# Patient Record
Sex: MALE | Race: WHITE | NOT HISPANIC OR LATINO | Employment: FULL TIME | URBAN - METROPOLITAN AREA
[De-identification: names, ages, dates, MRNs, and addresses within clinical notes are randomized per-mention and may not be internally consistent; named-entity substitution may affect disease eponyms.]

---

## 2019-03-04 PROBLEM — E78.5 DYSLIPIDEMIA: Status: ACTIVE | Noted: 2019-03-04

## 2019-03-04 PROBLEM — E11.9 CONTROLLED TYPE 2 DIABETES MELLITUS WITHOUT COMPLICATION, WITHOUT LONG-TERM CURRENT USE OF INSULIN: Status: ACTIVE | Noted: 2019-03-04

## 2019-03-04 PROBLEM — M76.60 ACHILLES TENDINITIS: Status: ACTIVE | Noted: 2019-03-04

## 2020-02-12 PROBLEM — K21.9 GASTROESOPHAGEAL REFLUX DISEASE: Status: ACTIVE | Noted: 2020-02-12

## 2020-02-12 PROBLEM — J32.9 RHINOSINUSITIS: Status: ACTIVE | Noted: 2020-02-12

## 2020-02-12 PROBLEM — R05.9 COUGH: Status: ACTIVE | Noted: 2020-02-12

## 2020-02-14 PROBLEM — E11.65 UNCONTROLLED TYPE 2 DIABETES MELLITUS WITH HYPERGLYCEMIA: Status: ACTIVE | Noted: 2020-02-14

## 2020-05-20 PROBLEM — R05.3 CHRONIC COUGH: Status: ACTIVE | Noted: 2020-02-12

## 2020-05-20 PROBLEM — R93.89 ABNORMAL CHEST X-RAY: Status: ACTIVE | Noted: 2020-05-20

## 2020-11-17 PROBLEM — H35.033 HYPERTENSIVE RETINOPATHY OF BOTH EYES: Status: ACTIVE | Noted: 2020-11-17

## 2020-11-17 PROBLEM — E11.3293 CONTROLLED TYPE 2 DIABETES MELLITUS WITH BOTH EYES AFFECTED BY MILD NONPROLIFERATIVE RETINOPATHY WITHOUT MACULAR EDEMA, WITHOUT LONG-TERM CURRENT USE OF INSULIN: Status: ACTIVE | Noted: 2020-11-17

## 2022-01-10 PROBLEM — U07.1 PNEUMONIA DUE TO COVID-19 VIRUS: Status: ACTIVE | Noted: 2022-01-10

## 2022-01-10 PROBLEM — J12.82 PNEUMONIA DUE TO COVID-19 VIRUS: Status: ACTIVE | Noted: 2022-01-10

## 2022-01-11 PROBLEM — R09.02 HYPOXEMIA: Status: ACTIVE | Noted: 2022-01-11

## 2022-01-11 PROBLEM — D50.9 MICROCYTIC ANEMIA: Status: ACTIVE | Noted: 2022-01-11

## 2022-02-17 ENCOUNTER — TELEPHONE (OUTPATIENT)
Dept: ENDOCRINOLOGY | Facility: CLINIC | Age: 67
End: 2022-02-17
Payer: MEDICARE

## 2022-02-17 NOTE — TELEPHONE ENCOUNTER
----- Message from Tres Estes sent at 2/17/2022  9:41 AM CST -----  Contact: Self  Type:  Sooner Apoointment Request    Caller is requesting a sooner appointment.  Caller declined first available appointment listed below.  Caller will not accept being placed on the waitlist and is requesting a message be sent to doctor.    Name of Caller:  Patient  When is the first available appointment?  3/21  Symptoms:  T2D, ref by   Best Call Back Number:  [phone]   Additional Information:  Ref by PCP, ok with seeing NP on any day Wed-Fri

## 2022-03-28 ENCOUNTER — TELEPHONE (OUTPATIENT)
Dept: SURGERY | Facility: CLINIC | Age: 67
End: 2022-03-28
Payer: MEDICARE

## 2022-03-28 ENCOUNTER — PATIENT MESSAGE (OUTPATIENT)
Dept: SURGERY | Facility: CLINIC | Age: 67
End: 2022-03-28
Payer: MEDICARE

## 2022-03-30 ENCOUNTER — OFFICE VISIT (OUTPATIENT)
Dept: SURGERY | Facility: CLINIC | Age: 67
End: 2022-03-30
Payer: MEDICARE

## 2022-03-30 VITALS
BODY MASS INDEX: 31.62 KG/M2 | DIASTOLIC BLOOD PRESSURE: 75 MMHG | HEIGHT: 73 IN | HEART RATE: 73 BPM | WEIGHT: 238.56 LBS | SYSTOLIC BLOOD PRESSURE: 148 MMHG

## 2022-03-30 DIAGNOSIS — C20 RECTAL CANCER: Primary | ICD-10-CM

## 2022-03-30 PROCEDURE — 1101F PT FALLS ASSESS-DOCD LE1/YR: CPT | Mod: CPTII,S$GLB,, | Performed by: COLON & RECTAL SURGERY

## 2022-03-30 PROCEDURE — 3078F PR MOST RECENT DIASTOLIC BLOOD PRESSURE < 80 MM HG: ICD-10-PCS | Mod: CPTII,S$GLB,, | Performed by: COLON & RECTAL SURGERY

## 2022-03-30 PROCEDURE — 1126F PR PAIN SEVERITY QUANTIFIED, NO PAIN PRESENT: ICD-10-PCS | Mod: CPTII,S$GLB,, | Performed by: COLON & RECTAL SURGERY

## 2022-03-30 PROCEDURE — 3288F FALL RISK ASSESSMENT DOCD: CPT | Mod: CPTII,S$GLB,, | Performed by: COLON & RECTAL SURGERY

## 2022-03-30 PROCEDURE — 99205 OFFICE O/P NEW HI 60 MIN: CPT | Mod: 25,S$GLB,, | Performed by: COLON & RECTAL SURGERY

## 2022-03-30 PROCEDURE — 3046F PR MOST RECENT HEMOGLOBIN A1C LEVEL > 9.0%: ICD-10-PCS | Mod: CPTII,S$GLB,, | Performed by: COLON & RECTAL SURGERY

## 2022-03-30 PROCEDURE — 3078F DIAST BP <80 MM HG: CPT | Mod: CPTII,S$GLB,, | Performed by: COLON & RECTAL SURGERY

## 2022-03-30 PROCEDURE — 3077F PR MOST RECENT SYSTOLIC BLOOD PRESSURE >= 140 MM HG: ICD-10-PCS | Mod: CPTII,S$GLB,, | Performed by: COLON & RECTAL SURGERY

## 2022-03-30 PROCEDURE — 45330 PR SIGMOIDOSCOPY,DIAG2STIC: ICD-10-PCS | Mod: S$GLB,,, | Performed by: COLON & RECTAL SURGERY

## 2022-03-30 PROCEDURE — 3008F PR BODY MASS INDEX (BMI) DOCUMENTED: ICD-10-PCS | Mod: CPTII,S$GLB,, | Performed by: COLON & RECTAL SURGERY

## 2022-03-30 PROCEDURE — 45330 DIAGNOSTIC SIGMOIDOSCOPY: CPT | Mod: S$GLB,,, | Performed by: COLON & RECTAL SURGERY

## 2022-03-30 PROCEDURE — 99205 PR OFFICE/OUTPT VISIT, NEW, LEVL V, 60-74 MIN: ICD-10-PCS | Mod: 25,S$GLB,, | Performed by: COLON & RECTAL SURGERY

## 2022-03-30 PROCEDURE — 3046F HEMOGLOBIN A1C LEVEL >9.0%: CPT | Mod: CPTII,S$GLB,, | Performed by: COLON & RECTAL SURGERY

## 2022-03-30 PROCEDURE — 1159F MED LIST DOCD IN RCRD: CPT | Mod: CPTII,S$GLB,, | Performed by: COLON & RECTAL SURGERY

## 2022-03-30 PROCEDURE — 3008F BODY MASS INDEX DOCD: CPT | Mod: CPTII,S$GLB,, | Performed by: COLON & RECTAL SURGERY

## 2022-03-30 PROCEDURE — 1126F AMNT PAIN NOTED NONE PRSNT: CPT | Mod: CPTII,S$GLB,, | Performed by: COLON & RECTAL SURGERY

## 2022-03-30 PROCEDURE — 99999 PR PBB SHADOW E&M-EST. PATIENT-LVL III: CPT | Mod: PBBFAC,,, | Performed by: COLON & RECTAL SURGERY

## 2022-03-30 PROCEDURE — 1101F PR PT FALLS ASSESS DOC 0-1 FALLS W/OUT INJ PAST YR: ICD-10-PCS | Mod: CPTII,S$GLB,, | Performed by: COLON & RECTAL SURGERY

## 2022-03-30 PROCEDURE — 99999 PR PBB SHADOW E&M-EST. PATIENT-LVL III: ICD-10-PCS | Mod: PBBFAC,,, | Performed by: COLON & RECTAL SURGERY

## 2022-03-30 PROCEDURE — 3288F PR FALLS RISK ASSESSMENT DOCUMENTED: ICD-10-PCS | Mod: CPTII,S$GLB,, | Performed by: COLON & RECTAL SURGERY

## 2022-03-30 PROCEDURE — 1159F PR MEDICATION LIST DOCUMENTED IN MEDICAL RECORD: ICD-10-PCS | Mod: CPTII,S$GLB,, | Performed by: COLON & RECTAL SURGERY

## 2022-03-30 PROCEDURE — 3077F SYST BP >= 140 MM HG: CPT | Mod: CPTII,S$GLB,, | Performed by: COLON & RECTAL SURGERY
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
## 2022-03-30 NOTE — PROVATION PATIENT INSTRUCTIONS
Discharge Summary/Instructions after an Endoscopic Procedure  Patient Name: Edgar Mason  Patient MRN: [id number]  Patient YOB: 1955  Wednesday, March 30, 2022  West Hong MD  Dear patient,  As a result of recent federal legislation (The Federal Cures Act), you may   receive lab or pathology results from your procedure in your [facility]   account before your physician is able to contact you. Your physician or   their representative will relay the results to you with their   recommendations at their soonest availability.  Thank you,  RESTRICTIONS:  During your procedure today, you received medications for sedation.  These   medications may affect your judgment, balance and coordination.  Therefore,   for 24 hours, you have the following restrictions:   - DO NOT drive a car, operate machinery, make legal/financial decisions,   sign important papers or drink alcohol.    ACTIVITY:  Today: no heavy lifting, straining or running due to procedural   sedation/anesthesia.  The following day: return to full activity including work.  DIET:  Eat and drink normally unless instructed otherwise.     TREATMENT FOR COMMON SIDE EFFECTS:  - Mild abdominal pain, nausea, belching, bloating or excessive gas:  rest,   eat lightly and use a heating pad.  - Sore Throat: treat with throat lozenges and/or gargle with warm salt   water.  - Because air was used during the procedure, expelling large amounts of air   from your rectum or belching is normal.  - If a bowel prep was taken, you may not have a bowel movement for 1-3 days.    This is normal.  SYMPTOMS TO WATCH FOR AND REPORT TO YOUR PHYSICIAN:  1. Abdominal pain or bloating, other than gas cramps.  2. Chest pain.  3. Back pain.  4. Signs of infection such as: chills or fever occurring within 24 hours   after the procedure.  5. Rectal bleeding, which would show as bright red, maroon, or black stools.   (A tablespoon of blood from the rectum is not serious, especially if    hemorrhoids are present.)  6. Vomiting.  7. Weakness or dizziness.  GO DIRECTLY TO THE NEAREST EMERGENCY ROOM IF YOU HAVE ANY OF THE FOLLOWING:      Difficulty breathing              Chills and/or fever over 101 F   Persistent vomiting and/or vomiting blood   Severe abdominal pain   Severe chest pain   Black, tarry stools   Bleeding- more than one tablespoon   Any other symptom or condition that you feel may need urgent attention  Your doctor recommends these additional instructions:  If any biopsies were taken, your doctors clinic will contact you in 1 to 2   weeks with any results.  - Discharge patient to home (ambulatory).   - Patient has a contact number available for emergencies.  The signs and   symptoms of potential delayed complications were discussed with the   patient.  Return to normal activities tomorrow.  Written discharge   instructions were provided to the patient.   - Resume previous diet.   - Return to my office in 1 week.   - Perform magnetic resonance imaging (MRI) with gadolinium today.  For questions, problems or results please call your physician - West Hong MD at Work:  [phone].  [facility] [place], EMERGENCY ROOM PHONE NUMBER: [phone]  IF A COMPLICATION OR EMERGENCY SITUATION ARISES AND YOU ARE UNABLE TO REACH   YOUR PHYSICIAN - GO DIRECTLY TO THE EMERGENCY ROOM.  West Hong MD  3/30/2022 1:07:16 PM  This report has been verified and signed electronically.  Dear patient,  As a result of recent federal legislation (The Federal Cures Act), you may   receive lab or pathology results from your procedure in your [facility]   account before your physician is able to contact you. Your physician or   their representative will relay the results to you with their   recommendations at their soonest availability.  Thank you,  PROVATION

## 2022-04-04 ENCOUNTER — PATIENT MESSAGE (OUTPATIENT)
Dept: SURGERY | Facility: CLINIC | Age: 67
End: 2022-04-04
Payer: MEDICARE

## 2022-04-04 DIAGNOSIS — C20 RECTAL CANCER: Primary | ICD-10-CM

## 2022-04-20 ENCOUNTER — DOCUMENTATION ONLY (OUTPATIENT)
Dept: SURGERY | Facility: CLINIC | Age: 67
End: 2022-04-20
Payer: MEDICARE

## 2022-04-20 NOTE — PROGRESS NOTES
[facility]  Colon, Rectal and Anal Malignancies  Multi-disciplinary Tumor Board     [address]  [place], [place]  Tel: [phone]  Fax: [phone]  [email or web address]     Patient name: KIRSTY Mason   YOB: 1955   MRN: [id number]    Date of discussion: 04/20/2022    Thank you for referring Mr. Mason to our care here at [facility]. Please allow us to summarize our review of records and recommendations.    The following disciplines were present for the discussion:    Colon and Rectal Surgery   Medical Oncology   Radiation Oncology   Pathology   Radiology    Endoscopy reviewed:    Date performed: 3/17/2022   Yes, complete evaluation of colon and rectum performed    CT Chest, Abdomen and Pelvis    Date performed: 3/23/2022    Performed at our facility: No   Metastatic disease present: Equivocal, surveillance recommended    MRI Rectal Cancer Protocol   Date performed: 4/9/2022    Performed at our facility: No   Tumor location in the rectum: Upper (10-15 cm)   Sphincter involvement: Absent   Pretreatment circumferential resection margin status: Not threatened    Pathology reviewed:    Date pathology finalized: 3/17/2022   Yes, report only     Pre-treatment CEA:   Date performed: Not performed   CEA Level: Not performed    Pre-treatment Clinical Stage:   T3 - Tumor invades the muscularis propria   N0 - No regional lymph node metastasis suspected   M0 - No metastasis suspected    Based on our review of the current information we have made the following recommendations:  Summary: 66M with newly diagnosed high rectal cancer. Imaging reviewed and staged as T3N0. Plan for total neoadjuvant therapy.     Additional laboratory, imaging, or endoscopic studies   none    Therapies   Short-course radiation followed by chemotherapy and restaging    Clinical research study eligibility - No    Referrals   none    Please do not hesitate to contact us for any  questions.

## 2022-04-24 ENCOUNTER — PATIENT MESSAGE (OUTPATIENT)
Dept: SURGERY | Facility: CLINIC | Age: 67
End: 2022-04-24
Payer: MEDICARE

## 2022-04-25 ENCOUNTER — OFFICE VISIT (OUTPATIENT)
Dept: SURGERY | Facility: CLINIC | Age: 67
End: 2022-04-25
Payer: MEDICARE

## 2022-04-25 ENCOUNTER — DOCUMENTATION ONLY (OUTPATIENT)
Dept: ADMINISTRATIVE | Facility: OTHER | Age: 67
End: 2022-04-25
Payer: MEDICARE

## 2022-04-25 ENCOUNTER — TELEPHONE (OUTPATIENT)
Dept: SURGERY | Facility: CLINIC | Age: 67
End: 2022-04-25
Payer: MEDICARE

## 2022-04-25 VITALS
SYSTOLIC BLOOD PRESSURE: 144 MMHG | DIASTOLIC BLOOD PRESSURE: 69 MMHG | HEART RATE: 80 BPM | BODY MASS INDEX: 31.15 KG/M2 | OXYGEN SATURATION: 97 % | WEIGHT: 236.13 LBS

## 2022-04-25 DIAGNOSIS — C20 RECTAL CANCER: Primary | ICD-10-CM

## 2022-04-25 PROCEDURE — 3077F SYST BP >= 140 MM HG: CPT | Mod: CPTII,S$GLB,, | Performed by: COLON & RECTAL SURGERY

## 2022-04-25 PROCEDURE — 3077F PR MOST RECENT SYSTOLIC BLOOD PRESSURE >= 140 MM HG: ICD-10-PCS | Mod: CPTII,S$GLB,, | Performed by: COLON & RECTAL SURGERY

## 2022-04-25 PROCEDURE — 3078F PR MOST RECENT DIASTOLIC BLOOD PRESSURE < 80 MM HG: ICD-10-PCS | Mod: CPTII,S$GLB,, | Performed by: COLON & RECTAL SURGERY

## 2022-04-25 PROCEDURE — 1159F MED LIST DOCD IN RCRD: CPT | Mod: CPTII,S$GLB,, | Performed by: COLON & RECTAL SURGERY

## 2022-04-25 PROCEDURE — 99214 PR OFFICE/OUTPT VISIT, EST, LEVL IV, 30-39 MIN: ICD-10-PCS | Mod: S$GLB,,, | Performed by: COLON & RECTAL SURGERY

## 2022-04-25 PROCEDURE — 99214 OFFICE O/P EST MOD 30 MIN: CPT | Mod: S$GLB,,, | Performed by: COLON & RECTAL SURGERY

## 2022-04-25 PROCEDURE — 3078F DIAST BP <80 MM HG: CPT | Mod: CPTII,S$GLB,, | Performed by: COLON & RECTAL SURGERY

## 2022-04-25 PROCEDURE — 3046F PR MOST RECENT HEMOGLOBIN A1C LEVEL > 9.0%: ICD-10-PCS | Mod: CPTII,S$GLB,, | Performed by: COLON & RECTAL SURGERY

## 2022-04-25 PROCEDURE — 3046F HEMOGLOBIN A1C LEVEL >9.0%: CPT | Mod: CPTII,S$GLB,, | Performed by: COLON & RECTAL SURGERY

## 2022-04-25 PROCEDURE — 99999 PR PBB SHADOW E&M-EST. PATIENT-LVL III: CPT | Mod: PBBFAC,,, | Performed by: COLON & RECTAL SURGERY

## 2022-04-25 PROCEDURE — 3008F PR BODY MASS INDEX (BMI) DOCUMENTED: ICD-10-PCS | Mod: CPTII,S$GLB,, | Performed by: COLON & RECTAL SURGERY

## 2022-04-25 PROCEDURE — 1159F PR MEDICATION LIST DOCUMENTED IN MEDICAL RECORD: ICD-10-PCS | Mod: CPTII,S$GLB,, | Performed by: COLON & RECTAL SURGERY

## 2022-04-25 PROCEDURE — 3008F BODY MASS INDEX DOCD: CPT | Mod: CPTII,S$GLB,, | Performed by: COLON & RECTAL SURGERY

## 2022-04-25 PROCEDURE — 99999 PR PBB SHADOW E&M-EST. PATIENT-LVL III: ICD-10-PCS | Mod: PBBFAC,,, | Performed by: COLON & RECTAL SURGERY

## 2022-04-25 NOTE — TELEPHONE ENCOUNTER
Left message that Dr Hong would like to see pt today. Told him to come in between 2:00 and 2:40pm.

## 2022-04-25 NOTE — TELEPHONE ENCOUNTER
----- Message from Samantha Brown sent at 4/25/2022 12:24 PM CDT -----  Type:  Patient Returning Call    Who Called: Patient   Who Left Message for Patient:  Kami  Does the patient know what this is regarding?:  yes  Best Call Back Number:   [phone]  Additional Information:  Patient returning missed call

## 2022-04-25 NOTE — PROGRESS NOTES
Pt and wife seen today in follow up of mid to upper rectal CA  MDT recommendations reviewed.  T3 N0 tumor - KORY (short course followed by chemotherapy FOLFOX 8 cycles)  Pt will then undergo restaging MRI CT CAP and flex sigmoidoscopy for operative planning  The pt will RTC in 4 months.

## 2022-05-02 NOTE — PROGRESS NOTES
RADIATION ONCOLOGY CONSULTATION  JA[facility]        NAME: KIRSTY Mason    : 1955 SEX: NIKUNJ MR#: [id number]   DIAGNOSIS: Rectal cancer.   REFERRING PHYSICIAN: Aristeo Gomez M.D.   DATE OF CONSULTATION: 2022         IDENTIFICATION:  The patient is a 66-year-old male who presents with a presumed [id number] moderately differentiated rectal adenocarcinoma, status post colonoscopy and biopsy.  The patient is being seen in consultation for consideration of radiotherapy at the request of Dr. Gomez.    HISTORY OF PRESENT ILLNESS:  The patient reports experiencing intermittent diarrhea and bright-red blood per rectum over the past year, which he initially attributed to metformin.  He reports subsequently changing medication with some decrease in the bright-red blood per rectum, however, his stools remain soft.  In 2022, he was diagnosed and hospitalized with COVID during which time he experienced a 20-pound unintentional weight loss.  Angiogram of the chest on 01/10/2022, showed no filling defects. There were bilateral focal ground-glass opacities consistent with an infectious process.     He saw Dr. Greer of Gastroenterology on 2022, who recommended a routine colonoscopy and noted increased liver function tests, for which an abdominal ultrasound was ordered.  Abnormal ultrasound on 2022, showed gallbladder sludge.  There was a slightly heterogeneous echotexture of the liver.     On 2022, Dr. Greer performed a colonoscopy.  There was an ulcerated partially obstructing circumferential rectal mass without bleeding, 5 cm in length, 11 to 16 cm from the anal verge which was tattooed.  Biopsies positive for mildly differentiated adenocarcinoma without lymphovascular space invasion.  There was also a 5 mm sessile cecal polyp, for which a polypectomy was performed which demonstrated tubular adenoma.  The tumor was MLH1, MSH2, MSH6 and PMS2 intact.     He underwent a repeat  colonoscopy due to poor prep on the initial colonoscopy, on 03/17/2022.  The rectal mass was again noted.  There was evidence of sigmoid diverticulosis.  There were two 4 to 9 mm ascending colon polyps which were found to be tubular adenomas.  CT of the chest, abdomen and pelvis on 03/23/2022, showed a new 5 mm right middle lobe nodule.  There were multiple nonspecific stable bilateral pulmonary nodules.  There was asymmetric thickening in the rectosigmoid colon walls with mild pericolonic fat stranding.  There was no lymphadenopathy noted.     He saw Dr. Cárdenas of Surgery on 03/25/2022, who referred the patient to Colorectal Surgery.  He saw Dr. Gomez of Medical Oncology on 03/28/2022, who recommended a PET scan, MRI, blood work and referred the patient to Radiation Oncology.     He saw Dr. Hong of Colorectal Surgery on 03/30/2022, who noted a mid to upper rectal cancer and recommended MRI of the rectum, flexible sigmoidoscopy and possible need for neoadjuvant treatment.  A flexible sigmoidoscopy on the date of consultation demonstrated a fungating, infiltrative, sessile, ulcerated nonobstructing mass in the mid rectum which was noncircumferential with oozing.  The mass was 12 cm from the verge and just below the third rectal valve.     He underwent an MRI of the abdomen on 04/02/2022, which showed suspicious liver findings or lymphadenopathy.  Rectal MRI on 04/09/2022, showed a 4.8 cm mass in the rectosigmoid junction, 10 cm from the verge.  The fat planes were seen to be maintained without serosal breaching.  There were non-enlarged mesenteric lymph nodes noted.  The tumor does not appear to have been staged on the radiology report.     PET scan on 04/18/2022, showed soft tissue thickening in the rectosigmoid with an SUV of 20.9.  There was no perirectal lymphadenopathy or distant metastases. The patient appears to have been presented at [facility] Tumor Board on the [place] with the recommendation for total  neoadjuvant therapy to consist of short-course radiation followed by chemotherapy.  The patient is not aware of any follow up appointments with either Dr. Gomez or Dr. Hong.    REVIEW OF SYSTEMS:  The patient continues to note bright-red blood per rectum intermittently with bowel movement, remains unchanged.  Continues to note soft stools with occasional diarrhea, as well as rectal urgency.  He has one to two bowel movements per day.  He denies any high fevers, shaking chills or drenching night sweats, and states that his weight is stable, and his only other complaint is decreased sense of taste secondary to COVID infection.     He denies any recent change in vision, hearing or swallowing, chest pain, shortness of breath, cough, abdominal pain, nausea, vomiting or urinary symptoms.  He denies any focal numbness, tingling, weakness, severe headaches, diplopia or ataxia.  All other systems reviewed and negative.    PAST MEDICAL HISTORY:  Rectal cancer as above, history of COVID-19 infection, diabetes mellitus, coronary artery disease, hypercholesterolemia, gastroesophageal reflux disease, bursitis.    PAST SURGICAL HISTORY:  None.    PAST RADIATION THERAPY:  None.    MEDICATIONS:  Aspirin, cinnamon bark, Clenpiq, vitamin B12, Dulcolax, furosemide, pioglitazone, ProAir inhaler, Flonase, Humalog, Levemir, multivitamin, Protonix, Zyrtec.    ALLERGIES:  No known drug allergies.    FAMILY HISTORY:  No family history of malignancy, including colorectal cancer.    SOCIAL HISTORY:  The patient does not smoke, drink alcohol and denies illicit drug use. He lives in [place].  He is  and has two sons.  He works as a  and on payroll.    PHYSICAL EXAMINATION:  VITAL SIGNS:  Blood pressure 130/79, temperature 97.3, heart rate 79, weight 230 pounds, height 6 feet 1 inch, oxygen saturation 98% on room air. ECOG score 0-1.   GENERAL: The patient is a pleasant well-nourished, well-developed  male in no  acute distress.  HEENT:  Normocephalic, atraumatic.  Extraocular muscles intact.  Pupils equally round and reactive to light.  Sclerae anicteric.  Oral cavity and oropharynx are clear without erythema, exudate, masses or ulcerations.   NECK:  Supple without lymphadenopathy or thyromegaly.   HEART:  Regular rate and rhythm.  Normal S1, S2.  No murmurs, gallops or rubs.     LUNGS:  Clear to auscultation bilaterally.  No wheezes, rhonchi or rales.   BACK: No spinal or costovertebral angle tenderness.   ABDOMEN:  Soft, non-tender, non-distended.  No masses or hepatosplenomegaly.   EXTREMITIES: Warm and well perfused.  No clubbing, cyanosis or edema.   NEUROLOGIC:  Cranial nerves two through 12 grossly intact.  Motor and sensory intact bilaterally.  Finger-nose-finger and gait within normal limits.  1+ patellar deep tendon reflexes bilaterally.  No clonus.   GROINS: No inguinal lymphadenopathy bilaterally.   DIGITAL RECTAL: Good sphincter tone.  There are no palpable masses or suspicious abnormalities otherwise.    LABORATORY DATA:  None available.    ASSESSMENT AND PLAN:  The patient is a 66-year-old male who presents with a presumed [id number] moderately differentiated rectal adenocarcinoma, status post colonoscopy and biopsy.     Based upon this patient's history and presentation, I believe he is likely an appropriate candidate for external beam radiation therapy.  I would tentatively stage him as presenting with cT3N0 disease on the basis of the PET scan, CTs and MRI.  It is unclear whether the MRI was obtained utilizing rectal protocol, as it does not appear to have been read as such, however, I think that he is likely presenting with T3N0 disease.     I explained to the patient that if he is presenting with T3 and/or node positive disease, that the standard of care is for preoperative therapy followed by surgical resection.  The patient has already met with Dr. Hong who feels that he is an appropriate candidate, and  I anticipate that he will be a candidate for a low anterior resection and mesorectal excision without the need for a permanent colostomy given the proximal location of the tumor, the decisions of which I will defer to Surgery.  In addition, the patient has already met with Dr. Gomez of Medical Oncology for a discussion of chemotherapy options.     I explained to the patient and his wife that there are a number of sequencing options available for treatment of this disease.  Patients requiring preoperative therapy are increasingly treated with total neoadjuvant therapy consisting of therapy and radiation prior to surgery, in which chemotherapy may precede radiation or vice-versa.  In addition, the patient may be treated with standard course external beam radiation therapy with concurrent chemotherapy over approximately five and half weeks or with short-course high-dose external beam radiation therapy alone over five fractions with a slightly higher risk for complications, but less delay in initiating other treatment modalities.     The patient appears to have been presented at Tumor Board at [facility] in [place] with the recommendation made for total neoadjuvant therapy consisting of short-course radiation followed by chemotherapy, after which the patient would proceed to surgery.  I do not feel that this would be an unreasonable treatment plan in Mr. Mason's case and would consider him a candidate for short-course radiation, as the small bowel is not immediately adjacent to the tumor despite being a high rectal cancer.  In any case, I plan to discuss Mr. Mason's treatment with Dr. Hong in order to confirm that he is in agreement with those Tumor Board recommendations, and if so will proceed with radiation treatment planning for which the patient has already undergone simulation at the time of PET scan.     The risks, benefits, side effects, alternatives to, indications for and mechanisms of external beam radiation  therapy were explained in full to the patient.  He and his wife,who accompanied him at today's visit asked multiple appropriate questions, all which were answered to their apparent satisfaction.  This conversation included a discussion of possible short-term side effects, including but not limited to loose stools, diarrhea, increased rectal urgency, increased urinary urgency, frequency and/or dysuria, hip discomfort, abdominal cramping, dermatitis, local alopecia and fatigue.  We also discussed possible long-term side effects, including but not limited to hyperpigmentation and/or alopecia, low risk of long-term damage to the bladder, increased risk of erectile dysfunction over several years, very low risk of secondary tumor formation, and slightly increased risk of sacral plexus injury and/or small bowel injury with high-dose radiation.  The patient agreed with the proposed treatment plan.  Informed consent was obtained.     Mr. Mason has already undergone PET-CT guided simulation in the prone treatment position with the use of a belly board for custom immobilization.  After I have discussed his case with Dr. Hong and Dr. Gomez, final treatment recommendations will be made, at which time radiation treatment planning will ensue.  He will thereafter return to [facility] to undergo verification films and to likely initiate a five-day course of preoperative hypofractionated intensity-modulated radiotherapy to the rectum and pelvic lymph nodes.     Thank you very much, Dr. Gomez, for this consultation and the opportunity to participate in the care of this patient.  Please do not hesitate to contact me should you have any questions, concerns and/or require additional information.      ADDENDUM:  I have discussed Mr. Mason's case with Dr. Hong who is in agreement with the plan to proceed with short-course high-dose radiotherapy alone followed by chemotherapy in a total neoadjuvant setting, after which the  patient would undergo surgery.        LISA Eagle MD

## 2022-05-19 PROBLEM — C20 RECTAL CANCER: Status: ACTIVE | Noted: 2022-05-19

## 2022-06-23 ENCOUNTER — TELEPHONE (OUTPATIENT)
Dept: SURGERY | Facility: CLINIC | Age: 67
End: 2022-06-23
Payer: MEDICARE

## 2022-06-23 NOTE — TELEPHONE ENCOUNTER
Called patient to get his morning apt on 8/22 moved to a different apt time due to Dr Hal valverde in the morning. Left a detailed vm with a call back number.

## 2022-07-25 ENCOUNTER — PATIENT MESSAGE (OUTPATIENT)
Dept: SURGERY | Facility: CLINIC | Age: 67
End: 2022-07-25
Payer: MEDICARE

## 2022-08-12 ENCOUNTER — PATIENT MESSAGE (OUTPATIENT)
Dept: SURGERY | Facility: CLINIC | Age: 67
End: 2022-08-12
Payer: MEDICARE

## 2022-08-15 DIAGNOSIS — C20 RECTAL CANCER: Primary | ICD-10-CM

## 2022-08-23 ENCOUNTER — PATIENT MESSAGE (OUTPATIENT)
Dept: SURGERY | Facility: CLINIC | Age: 67
End: 2022-08-23
Payer: MEDICARE

## 2022-08-24 ENCOUNTER — TELEPHONE (OUTPATIENT)
Dept: SURGERY | Facility: CLINIC | Age: 67
End: 2022-08-24
Payer: MEDICARE

## 2022-09-12 ENCOUNTER — HOSPITAL ENCOUNTER (OUTPATIENT)
Dept: RADIOLOGY | Facility: HOSPITAL | Age: 67
Discharge: HOME | End: 2022-09-12
Attending: COLON & RECTAL SURGERY
Payer: MEDICARE

## 2022-09-12 ENCOUNTER — OFFICE VISIT (OUTPATIENT)
Dept: SURGERY | Facility: CLINIC | Age: 67
End: 2022-09-12
Payer: MEDICARE

## 2022-09-12 VITALS
WEIGHT: 245.56 LBS | BODY MASS INDEX: 32.4 KG/M2 | SYSTOLIC BLOOD PRESSURE: 138 MMHG | DIASTOLIC BLOOD PRESSURE: 68 MMHG | HEART RATE: 82 BPM

## 2022-09-12 DIAGNOSIS — C20 RECTAL CANCER: ICD-10-CM

## 2022-09-12 DIAGNOSIS — C20 RECTAL CANCER: Primary | ICD-10-CM

## 2022-09-12 PROCEDURE — 3066F PR DOCUMENTATION OF TREATMENT FOR NEPHROPATHY: ICD-10-PCS | Mod: CPTII,S$GLB,, | Performed by: COLON & RECTAL SURGERY

## 2022-09-12 PROCEDURE — 45331 SIGMOIDOSCOPY AND BIOPSY: CPT | Mod: S$GLB,,, | Performed by: COLON & RECTAL SURGERY

## 2022-09-12 PROCEDURE — 99214 PR OFFICE/OUTPT VISIT, EST, LEVL IV, 30-39 MIN: ICD-10-PCS | Mod: 25,S$GLB,, | Performed by: COLON & RECTAL SURGERY

## 2022-09-12 PROCEDURE — 25500020 PHARM REV CODE 255: Mod: PO | Performed by: COLON & RECTAL SURGERY

## 2022-09-12 PROCEDURE — 3008F BODY MASS INDEX DOCD: CPT | Mod: CPTII,S$GLB,, | Performed by: COLON & RECTAL SURGERY

## 2022-09-12 PROCEDURE — 74177 CT ABD & PELVIS W/CONTRAST: CPT | Mod: 26,,, | Performed by: RADIOLOGY

## 2022-09-12 PROCEDURE — 3078F PR MOST RECENT DIASTOLIC BLOOD PRESSURE < 80 MM HG: ICD-10-PCS | Mod: CPTII,S$GLB,, | Performed by: COLON & RECTAL SURGERY

## 2022-09-12 PROCEDURE — 72195 MRI PELVIS W/O DYE: CPT | Mod: TC,PO

## 2022-09-12 PROCEDURE — 72195 MRI PELVIS W/O DYE: CPT | Mod: 26,,, | Performed by: RADIOLOGY

## 2022-09-12 PROCEDURE — 99999 PR PBB SHADOW E&M-EST. PATIENT-LVL III: ICD-10-PCS | Mod: PBBFAC,,, | Performed by: COLON & RECTAL SURGERY

## 2022-09-12 PROCEDURE — 72195 MRI RECTAL CANCER WITHOUT CONTRAST: ICD-10-PCS | Mod: 26,,, | Performed by: RADIOLOGY

## 2022-09-12 PROCEDURE — 3066F NEPHROPATHY DOC TX: CPT | Mod: CPTII,S$GLB,, | Performed by: COLON & RECTAL SURGERY

## 2022-09-12 PROCEDURE — A9698 NON-RAD CONTRAST MATERIALNOC: HCPCS | Mod: PO | Performed by: COLON & RECTAL SURGERY

## 2022-09-12 PROCEDURE — 99214 OFFICE O/P EST MOD 30 MIN: CPT | Mod: 25,S$GLB,, | Performed by: COLON & RECTAL SURGERY

## 2022-09-12 PROCEDURE — 99999 PR PBB SHADOW E&M-EST. PATIENT-LVL III: CPT | Mod: PBBFAC,,, | Performed by: COLON & RECTAL SURGERY

## 2022-09-12 PROCEDURE — 3075F SYST BP GE 130 - 139MM HG: CPT | Mod: CPTII,S$GLB,, | Performed by: COLON & RECTAL SURGERY

## 2022-09-12 PROCEDURE — 3061F PR NEG MICROALBUMINURIA RESULT DOCUMENTED/REVIEW: ICD-10-PCS | Mod: CPTII,S$GLB,, | Performed by: COLON & RECTAL SURGERY

## 2022-09-12 PROCEDURE — 3008F PR BODY MASS INDEX (BMI) DOCUMENTED: ICD-10-PCS | Mod: CPTII,S$GLB,, | Performed by: COLON & RECTAL SURGERY

## 2022-09-12 PROCEDURE — 45331 PR SIGMOIDOSCOPY,BIOPSY: ICD-10-PCS | Mod: S$GLB,,, | Performed by: COLON & RECTAL SURGERY

## 2022-09-12 PROCEDURE — 3078F DIAST BP <80 MM HG: CPT | Mod: CPTII,S$GLB,, | Performed by: COLON & RECTAL SURGERY

## 2022-09-12 PROCEDURE — 74177 CT ABD & PELVIS W/CONTRAST: CPT | Mod: TC,PO

## 2022-09-12 PROCEDURE — 3046F PR MOST RECENT HEMOGLOBIN A1C LEVEL > 9.0%: ICD-10-PCS | Mod: CPTII,S$GLB,, | Performed by: COLON & RECTAL SURGERY

## 2022-09-12 PROCEDURE — 71260 CT THORAX DX C+: CPT | Mod: TC,PO

## 2022-09-12 PROCEDURE — 3061F NEG MICROALBUMINURIA REV: CPT | Mod: CPTII,S$GLB,, | Performed by: COLON & RECTAL SURGERY

## 2022-09-12 PROCEDURE — 3046F HEMOGLOBIN A1C LEVEL >9.0%: CPT | Mod: CPTII,S$GLB,, | Performed by: COLON & RECTAL SURGERY

## 2022-09-12 PROCEDURE — 74177 CT CHEST ABDOMEN PELVIS WITH CONTRAST (XPD): ICD-10-PCS | Mod: 26,,, | Performed by: RADIOLOGY

## 2022-09-12 PROCEDURE — 3075F PR MOST RECENT SYSTOLIC BLOOD PRESS GE 130-139MM HG: ICD-10-PCS | Mod: CPTII,S$GLB,, | Performed by: COLON & RECTAL SURGERY

## 2022-09-12 PROCEDURE — 71260 CT THORAX DX C+: CPT | Mod: 26,,, | Performed by: RADIOLOGY

## 2022-09-12 PROCEDURE — 71260 CT CHEST ABDOMEN PELVIS WITH CONTRAST (XPD): ICD-10-PCS | Mod: 26,,, | Performed by: RADIOLOGY

## 2022-09-12 RX ADMIN — BARIUM SULFATE 900 ML: 20 SUSPENSION ORAL at 12:09

## 2022-09-12 RX ADMIN — IOHEXOL 100 ML: 350 INJECTION, SOLUTION INTRAVENOUS at 12:09
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
## 2022-09-13 NOTE — PROVATION PATIENT INSTRUCTIONS
Discharge Summary/Instructions after an Endoscopic Procedure  Patient Name: LIA Mason  Patient MRN: [id number]  Patient YOB: 1955  Monday, September 12, 2022  West Hong MD  Dear patient,  As a result of recent federal legislation (The Federal Cures Act), you may   receive lab or pathology results from your procedure in your [facility]   account before your physician is able to contact you. Your physician or   their representative will relay the results to you with their   recommendations at their soonest availability.  Thank you,  RESTRICTIONS:  During your procedure today, you received medications for sedation.  These   medications may affect your judgment, balance and coordination.  Therefore,   for 24 hours, you have the following restrictions:   - DO NOT drive a car, operate machinery, make legal/financial decisions,   sign important papers or drink alcohol.    ACTIVITY:  Today: no heavy lifting, straining or running due to procedural   sedation/anesthesia.  The following day: return to full activity including work.  DIET:  Eat and drink normally unless instructed otherwise.     TREATMENT FOR COMMON SIDE EFFECTS:  - Mild abdominal pain, nausea, belching, bloating or excessive gas:  rest,   eat lightly and use a heating pad.  - Sore Throat: treat with throat lozenges and/or gargle with warm salt   water.  - Because air was used during the procedure, expelling large amounts of air   from your rectum or belching is normal.  - If a bowel prep was taken, you may not have a bowel movement for 1-3 days.    This is normal.  SYMPTOMS TO WATCH FOR AND REPORT TO YOUR PHYSICIAN:  1. Abdominal pain or bloating, other than gas cramps.  2. Chest pain.  3. Back pain.  4. Signs of infection such as: chills or fever occurring within 24 hours   after the procedure.  5. Rectal bleeding, which would show as bright red, maroon, or black stools.   (A tablespoon of blood from the rectum is not serious, especially if    hemorrhoids are present.)  6. Vomiting.  7. Weakness or dizziness.  GO DIRECTLY TO THE NEAREST EMERGENCY ROOM IF YOU HAVE ANY OF THE FOLLOWING:      Difficulty breathing              Chills and/or fever over 101 F   Persistent vomiting and/or vomiting blood   Severe abdominal pain   Severe chest pain   Black, tarry stools   Bleeding- more than one tablespoon   Any other symptom or condition that you feel may need urgent attention  Your doctor recommends these additional instructions:  If any biopsies were taken, your doctors clinic will contact you in 1 to 2   weeks with any results.  You are being discharged to home.   You have a contact number available for emergencies.  The signs and symptoms   of potential delayed complications were discussed with you.  You may return   to normal activities tomorrow.  Written discharge instructions were   provided to you.   Resume your previous diet.   Your physician has recommended a repeat flexible sigmoidoscopy in four   months for surveillance.  For questions, problems or results please call your physician - West Hong MD at Work:  [phone].  EMERGENCY PHONE NUMBER: [phone], LAB RESULTS: [phone]  IF A COMPLICATION OR EMERGENCY SITUATION ARISES AND YOU ARE UNABLE TO REACH   YOUR PHYSICIAN - GO DIRECTLY TO THE EMERGENCY ROOM.  ___________________________________________  Nurse Signature  ___________________________________________  Patient/Designated Responsible Party Signature  West Hong MD  9/12/2022 4:32:07 PM  This report has been verified and signed electronically.  Dear patient,  As a result of recent federal legislation (The Federal Cures Act), you may   receive lab or pathology results from your procedure in your [facility]   account before your physician is able to contact you. Your physician or   their representative will relay the results to you with their   recommendations at their soonest availability.  Thank you.  PROVATION

## 2022-09-19 NOTE — PROGRESS NOTES
[facility]  Colon, Rectal and Anal Malignancies  Multi-disciplinary Tumor Board     [address]  [place], [place]  Tel: [phone]  Fax: [phone]  [email or web address]     Patient name: KIRSTY Mason   YOB: 1955   MRN: [id number]    Date of discussion: 9/21/2022    Thank you for referring Mr. Mason to our care here at [facility]. Please allow us to summarize our review of records and recommendations.    The following disciplines were present for the discussion:   Colon and Rectal Surgery  Medical Oncology  Radiation Oncology  Pathology  Radiology    Endoscopy reviewed:   Date performed: 9/12/2022  Flexible sigmoidoscopy    CT Chest, Abdomen and Pelvis   Date performed: 9/12/2022   Performed at our facility: Yes  Metastatic disease present: No    MRI Rectal Cancer Protocol  Date performed: 9/12/2022   Performed at our facility: Yes  Tumor location in the rectum: Upper (10-15 cm)  Sphincter involvement: Absent  Pretreatment circumferential resection margin status: Not threatened    Pathology reviewed:   Date pathology finalized: Not performed  Unavailable     Pre-treatment CEA:  Date performed: 9/6/2022  CEA Level:  3.4    Pre-treatment Clinical Stage:  T3 - Tumor invades the muscularis propria  N0 - No regional lymph node metastasis suspected  M0 - No metastasis suspected    Based on our review of the current information we have made the following recommendations:  66M with mid-upper rectal cancer, T3N0M0, currently undergoing KORY with short course XRT. Restaging MRI showing near/possible complete response. Patient would like to avoid surgery. Recommendation is for repeat scope in 3 months.       Clinical research study eligibility - No      Please do not hesitate to contact us for any questions.

## 2022-09-21 ENCOUNTER — DOCUMENTATION ONLY (OUTPATIENT)
Dept: SURGERY | Facility: CLINIC | Age: 67
End: 2022-09-21
Payer: MEDICARE

## 2022-10-09 ENCOUNTER — PATIENT MESSAGE (OUTPATIENT)
Dept: SURGERY | Facility: CLINIC | Age: 67
End: 2022-10-09
Payer: MEDICARE

## 2022-10-10 ENCOUNTER — PATIENT MESSAGE (OUTPATIENT)
Dept: SURGERY | Facility: CLINIC | Age: 67
End: 2022-10-10
Payer: MEDICARE

## 2022-11-10 ENCOUNTER — TELEPHONE (OUTPATIENT)
Dept: INFUSION THERAPY | Facility: HOSPITAL | Age: 67
End: 2022-11-10
Payer: MEDICARE

## 2022-11-10 NOTE — TELEPHONE ENCOUNTER
Left message I need to know what he wants to schedule. Aske that he message me with what type of appt.

## 2022-11-10 NOTE — TELEPHONE ENCOUNTER
----- Message from Shanika Tovar, Patient Care Assistant sent at 11/10/2022  2:45 PM CST -----  Type: Needs Medical Advice  Who Called:  aprillatia Elizabeth Call Back Number: [phone]    Additional Information: patient has referral and is ready to schedule , please call to further discuss, thank you

## 2022-11-15 ENCOUNTER — PATIENT MESSAGE (OUTPATIENT)
Dept: SURGERY | Facility: CLINIC | Age: 67
End: 2022-11-15
Payer: MEDICARE

## 2022-11-18 ENCOUNTER — TELEPHONE (OUTPATIENT)
Dept: INFUSION THERAPY | Facility: HOSPITAL | Age: 67
End: 2022-11-18
Payer: MEDICARE

## 2022-11-18 ENCOUNTER — TELEPHONE (OUTPATIENT)
Dept: SURGERY | Facility: HOSPITAL | Age: 67
End: 2022-11-18
Payer: MEDICARE

## 2022-11-18 ENCOUNTER — PATIENT MESSAGE (OUTPATIENT)
Dept: GASTROENTEROLOGY | Facility: CLINIC | Age: 67
End: 2022-11-18
Payer: MEDICARE

## 2022-11-18 DIAGNOSIS — C20 RECTAL CANCER: Primary | ICD-10-CM

## 2022-11-18 NOTE — TELEPHONE ENCOUNTER
Called Mr Mason and told him the colonoscopy is scheduled for 12/5/22. His instructions are being mailed to him and he will get a phone call 1-2 days before the procedure with the arrival time.

## 2022-12-01 ENCOUNTER — TELEPHONE (OUTPATIENT)
Dept: GASTROENTEROLOGY | Facility: CLINIC | Age: 67
End: 2022-12-01
Payer: MEDICARE

## 2022-12-01 NOTE — TELEPHONE ENCOUNTER
Left message for patient to return call, checking to se if he received his prep instructions for his colonoscopy.
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
## 2022-12-05 ENCOUNTER — HOSPITAL ENCOUNTER (OUTPATIENT)
Facility: HOSPITAL | Age: 67
Discharge: HOME | End: 2022-12-05
Attending: COLON & RECTAL SURGERY | Admitting: COLON & RECTAL SURGERY
Payer: MEDICARE

## 2022-12-05 ENCOUNTER — ANESTHESIA (OUTPATIENT)
Dept: ENDOSCOPY | Facility: HOSPITAL | Age: 67
End: 2022-12-05
Payer: MEDICARE

## 2022-12-05 ENCOUNTER — ANESTHESIA EVENT (OUTPATIENT)
Dept: ENDOSCOPY | Facility: HOSPITAL | Age: 67
End: 2022-12-05
Payer: MEDICARE

## 2022-12-05 DIAGNOSIS — Z12.11 SCREEN FOR COLON CANCER: ICD-10-CM

## 2022-12-05 DIAGNOSIS — C20 RECTAL CANCER: Primary | ICD-10-CM

## 2022-12-05 LAB — GLUCOSE SERPL-MCNC: 206 MG/DL (ref 70–110)

## 2022-12-05 PROCEDURE — 88305 TISSUE EXAM BY PATHOLOGIST: CPT | Mod: 26,,, | Performed by: PATHOLOGY

## 2022-12-05 PROCEDURE — 37000008 HC ANESTHESIA 1ST 15 MINUTES: Mod: PO | Performed by: COLON & RECTAL SURGERY

## 2022-12-05 PROCEDURE — 63600175 PHARM REV CODE 636 W HCPCS: Mod: PO | Performed by: NURSE ANESTHETIST, CERTIFIED REGISTERED

## 2022-12-05 PROCEDURE — D9220A PRA ANESTHESIA: Mod: PT,ANES,, | Performed by: ANESTHESIOLOGY

## 2022-12-05 PROCEDURE — 88305 TISSUE EXAM BY PATHOLOGIST: CPT | Performed by: PATHOLOGY

## 2022-12-05 PROCEDURE — 45380 COLONOSCOPY AND BIOPSY: CPT | Mod: PT,,, | Performed by: COLON & RECTAL SURGERY

## 2022-12-05 PROCEDURE — 27201012 HC FORCEPS, HOT/COLD, DISP: Mod: PO | Performed by: COLON & RECTAL SURGERY

## 2022-12-05 PROCEDURE — D9220A PRA ANESTHESIA: Mod: PT,CRNA,, | Performed by: NURSE ANESTHETIST, CERTIFIED REGISTERED

## 2022-12-05 PROCEDURE — 63600175 PHARM REV CODE 636 W HCPCS: Mod: PO | Performed by: COLON & RECTAL SURGERY

## 2022-12-05 PROCEDURE — 37000009 HC ANESTHESIA EA ADD 15 MINS: Mod: PO | Performed by: COLON & RECTAL SURGERY

## 2022-12-05 PROCEDURE — 88342 IMHCHEM/IMCYTCHM 1ST ANTB: CPT | Mod: 26,,, | Performed by: PATHOLOGY

## 2022-12-05 PROCEDURE — 88305 TISSUE EXAM BY PATHOLOGIST: ICD-10-PCS | Mod: 26,,, | Performed by: PATHOLOGY

## 2022-12-05 PROCEDURE — 82962 GLUCOSE BLOOD TEST: CPT | Mod: PO | Performed by: COLON & RECTAL SURGERY

## 2022-12-05 PROCEDURE — 88342 IMHCHEM/IMCYTCHM 1ST ANTB: CPT | Performed by: PATHOLOGY

## 2022-12-05 PROCEDURE — 88342 CHG IMMUNOCYTOCHEMISTRY: ICD-10-PCS | Mod: 26,,, | Performed by: PATHOLOGY

## 2022-12-05 PROCEDURE — 45380 COLONOSCOPY AND BIOPSY: CPT | Mod: PT,PO | Performed by: COLON & RECTAL SURGERY

## 2022-12-05 PROCEDURE — D9220A PRA ANESTHESIA: ICD-10-PCS | Mod: PT,CRNA,, | Performed by: NURSE ANESTHETIST, CERTIFIED REGISTERED

## 2022-12-05 PROCEDURE — D9220A PRA ANESTHESIA: ICD-10-PCS | Mod: PT,ANES,, | Performed by: ANESTHESIOLOGY

## 2022-12-05 PROCEDURE — 45380 PR COLONOSCOPY,BIOPSY: ICD-10-PCS | Mod: PT,,, | Performed by: COLON & RECTAL SURGERY

## 2022-12-05 PROCEDURE — 25000003 PHARM REV CODE 250: Mod: PO | Performed by: NURSE ANESTHETIST, CERTIFIED REGISTERED

## 2022-12-05 RX ORDER — LIDOCAINE HYDROCHLORIDE 20 MG/ML
INJECTION INTRAVENOUS
Status: DISCONTINUED | OUTPATIENT
Start: 2022-12-05 | End: 2022-12-05

## 2022-12-05 RX ORDER — PROPOFOL 10 MG/ML
VIAL (ML) INTRAVENOUS
Status: DISCONTINUED | OUTPATIENT
Start: 2022-12-05 | End: 2022-12-05

## 2022-12-05 RX ORDER — SODIUM CHLORIDE 0.9 % (FLUSH) 0.9 %
10 SYRINGE (ML) INJECTION
Status: DISCONTINUED | OUTPATIENT
Start: 2022-12-05 | End: 2022-12-05 | Stop reason: HOSPADM

## 2022-12-05 RX ORDER — SODIUM CHLORIDE, SODIUM LACTATE, POTASSIUM CHLORIDE, CALCIUM CHLORIDE 600; 310; 30; 20 MG/100ML; MG/100ML; MG/100ML; MG/100ML
INJECTION, SOLUTION INTRAVENOUS CONTINUOUS
Status: DISCONTINUED | OUTPATIENT
Start: 2022-12-05 | End: 2022-12-05 | Stop reason: HOSPADM

## 2022-12-05 RX ORDER — PHENYLEPHRINE HYDROCHLORIDE 10 MG/ML
INJECTION INTRAVENOUS
Status: DISCONTINUED | OUTPATIENT
Start: 2022-12-05 | End: 2022-12-05

## 2022-12-05 RX ADMIN — LIDOCAINE HYDROCHLORIDE 50 MG: 20 INJECTION INTRAVENOUS at 10:12

## 2022-12-05 RX ADMIN — SODIUM CHLORIDE, SODIUM LACTATE, POTASSIUM CHLORIDE, AND CALCIUM CHLORIDE: .6; .31; .03; .02 INJECTION, SOLUTION INTRAVENOUS at 09:12

## 2022-12-05 RX ADMIN — PROPOFOL 150 MCG/KG/MIN: 10 INJECTION, EMULSION INTRAVENOUS at 10:12

## 2022-12-05 RX ADMIN — PROPOFOL 50 MG: 10 INJECTION, EMULSION INTRAVENOUS at 10:12

## 2022-12-05 RX ADMIN — PHENYLEPHRINE HYDROCHLORIDE 100 MCG: 10 INJECTION, SOLUTION INTRAMUSCULAR; INTRAVENOUS; SUBCUTANEOUS at 10:12

## 2022-12-05 NOTE — ANESTHESIA PREPROCEDURE EVALUATION
12/05/2022  KIRSTY Mason is a 67 y.o., male.      Pre-op Assessment    I have reviewed the NPO Status.   I have reviewed the Medications.     Review of Systems  Anesthesia Hx:  No problems with previous Anesthesia    Social:  Non-Smoker    Cardiovascular:   Exercise tolerance: good Denies CAD.    Denies CABG/stent.   Denies Angina. ECG has been reviewed.    Hepatic/GI:   Bowel Prep. GERD    Neurological:  Neurology Normal    Endocrine:   Diabetes, type 2, using insulin        Physical Exam  General: Well nourished    Airway:  Mallampati: III / II  Mouth Opening: Normal  TM Distance: Normal  Tongue: Normal  Neck ROM: Normal ROM    Dental:  Intact    Chest/Lungs:  Clear to auscultation, Normal Respiratory Rate        Anesthesia Plan  Type of Anesthesia, risks & benefits discussed:    Anesthesia Type: Gen Natural Airway  Intra-op Monitoring Plan: Standard ASA Monitors  Induction:  IV  Informed Consent: Informed consent signed with the Patient and all parties understand the risks and agree with anesthesia plan.  All questions answered. Patient consented to blood products? No  ASA Score: 3    Ready For Surgery From Anesthesia Perspective.     .

## 2022-12-05 NOTE — TRANSFER OF CARE
"Anesthesia Transfer of Care Note    Patient: KIRSTY Mason    Procedure(s) Performed: Procedure(s) (LRB):  COLONOSCOPY (N/A)    Patient location: PACU    Anesthesia Type: general    Transport from OR: Transported from OR on room air with adequate spontaneous ventilation    Post pain: adequate analgesia    Post assessment: tolerated procedure well and no apparent anesthetic complications    Post vital signs: stable    Level of consciousness: awake and alert    Nausea/Vomiting: no nausea/vomiting    Complications: none    Transfer of care protocol was followed      Last vitals:   Visit Vitals  BP (!) 139/57 (BP Location: Right arm, Patient Position: Lying)   Pulse 72   Temp 36.4 °C (97.5 °F) (Temporal)   Resp 17   Ht 6' 1" (1.854 m)   Wt 105.7 kg (233 lb)   SpO2 95%   BMI 30.74 kg/m²     "

## 2022-12-05 NOTE — PROVATION PATIENT INSTRUCTIONS
Discharge Summary/Instructions after an Endoscopic Procedure  Patient Name: Edgar Mason  Patient MRN: [id number]  Patient YOB: 1955  Monday, December 5, 2022  West Hong MD  Dear patient,  As a result of recent federal legislation (The Federal Cures Act), you may   receive lab or pathology results from your procedure in your [facility]   account before your physician is able to contact you. Your physician or   their representative will relay the results to you with their   recommendations at their soonest availability.  Thank you,  RESTRICTIONS:  During your procedure today, you received medications for sedation.  These   medications may affect your judgment, balance and coordination.  Therefore,   for 24 hours, you have the following restrictions:   - DO NOT drive a car, operate machinery, make legal/financial decisions,   sign important papers or drink alcohol.    ACTIVITY:  Today: no heavy lifting, straining or running due to procedural   sedation/anesthesia.  The following day: return to full activity including work.  DIET:  Eat and drink normally unless instructed otherwise.     TREATMENT FOR COMMON SIDE EFFECTS:  - Mild abdominal pain, nausea, belching, bloating or excessive gas:  rest,   eat lightly and use a heating pad.  - Sore Throat: treat with throat lozenges and/or gargle with warm salt   water.  - Because air was used during the procedure, expelling large amounts of air   from your rectum or belching is normal.  - If a bowel prep was taken, you may not have a bowel movement for 1-3 days.    This is normal.  SYMPTOMS TO WATCH FOR AND REPORT TO YOUR PHYSICIAN:  1. Abdominal pain or bloating, other than gas cramps.  2. Chest pain.  3. Back pain.  4. Signs of infection such as: chills or fever occurring within 24 hours   after the procedure.  5. Rectal bleeding, which would show as bright red, maroon, or black stools.   (A tablespoon of blood from the rectum is not serious, especially if    hemorrhoids are present.)  6. Vomiting.  7. Weakness or dizziness.  GO DIRECTLY TO THE NEAREST EMERGENCY ROOM IF YOU HAVE ANY OF THE FOLLOWING:      Difficulty breathing              Chills and/or fever over 101 F   Persistent vomiting and/or vomiting blood   Severe abdominal pain   Severe chest pain   Black, tarry stools   Bleeding- more than one tablespoon   Any other symptom or condition that you feel may need urgent attention  Your doctor recommends these additional instructions:  If any biopsies were taken, your doctors clinic will contact you in 1 to 2   weeks with any results.  You are being discharged to home.   You have a contact number available for emergencies.  The signs and symptoms   of potential delayed complications were discussed with you.  You may return   to normal activities tomorrow.  Written discharge instructions were   provided to you.   Resume your previous diet.   Continue your present medications.   We are waiting for your pathology results.   Your physician has recommended a repeat colonoscopy in one year for   surveillance.   Return to my office in two weeks.  For questions, problems or results please call your physician - West Hong MD at Work:  [phone].  EMERGENCY PHONE NUMBER: [phone], LAB RESULTS: [phone]  IF A COMPLICATION OR EMERGENCY SITUATION ARISES AND YOU ARE UNABLE TO REACH   YOUR PHYSICIAN - GO DIRECTLY TO THE EMERGENCY ROOM.  ___________________________________________  Nurse Signature  ___________________________________________  Patient/Designated Responsible Party Signature  West Hong MD  12/5/2022 10:35:39 AM  This report has been verified and signed electronically.  Dear patient,  As a result of recent federal legislation (The Federal Cures Act), you may   receive lab or pathology results from your procedure in your [facility]   account before your physician is able to contact you. Your physician or   their representative will relay the results  to you with their   recommendations at their soonest availability.  Thank you.  PROVATION

## 2022-12-06 VITALS
SYSTOLIC BLOOD PRESSURE: 139 MMHG | BODY MASS INDEX: 30.88 KG/M2 | OXYGEN SATURATION: 98 % | HEART RATE: 75 BPM | TEMPERATURE: 98 F | HEIGHT: 73 IN | DIASTOLIC BLOOD PRESSURE: 75 MMHG | WEIGHT: 233 LBS | RESPIRATION RATE: 16 BRPM

## 2022-12-08 NOTE — ANESTHESIA POSTPROCEDURE EVALUATION
Anesthesia Post Evaluation    Patient: KIRSTY Mason    Procedure(s) Performed: Procedure(s) (LRB):  COLONOSCOPY (N/A)    Final Anesthesia Type: general      Patient location during evaluation: PACU  Patient participation: Yes- Able to Participate  Level of consciousness: awake and alert and oriented  Post-procedure vital signs: reviewed and stable  Pain management: adequate  Airway patency: patent    PONV status at discharge: No PONV  Anesthetic complications: no      Cardiovascular status: blood pressure returned to baseline  Respiratory status: unassisted, spontaneous ventilation and room air  Hydration status: euvolemic  Follow-up not needed.          Vitals Value Taken Time   /75 12/05/22 1115   Temp 36.7 °C (98 °F) 12/05/22 1115   Pulse 75 12/05/22 1115   Resp 16 12/05/22 1115   SpO2 98 % 12/05/22 1115         Event Time   Out of Recovery 11:29:00         Pain/Beth Score: No data recorded

## 2022-12-19 ENCOUNTER — OFFICE VISIT (OUTPATIENT)
Dept: SURGERY | Facility: CLINIC | Age: 67
End: 2022-12-19
Payer: MEDICARE

## 2022-12-19 VITALS
OXYGEN SATURATION: 97 % | HEART RATE: 72 BPM | SYSTOLIC BLOOD PRESSURE: 152 MMHG | BODY MASS INDEX: 31.82 KG/M2 | TEMPERATURE: 98 F | DIASTOLIC BLOOD PRESSURE: 69 MMHG | WEIGHT: 241.19 LBS

## 2022-12-19 DIAGNOSIS — C20 RECTAL CANCER: Primary | ICD-10-CM

## 2022-12-19 PROCEDURE — 3078F DIAST BP <80 MM HG: CPT | Mod: CPTII,S$GLB,, | Performed by: COLON & RECTAL SURGERY

## 2022-12-19 PROCEDURE — 99999 PR PBB SHADOW E&M-EST. PATIENT-LVL IV: CPT | Mod: PBBFAC,,, | Performed by: COLON & RECTAL SURGERY

## 2022-12-19 PROCEDURE — 3061F NEG MICROALBUMINURIA REV: CPT | Mod: CPTII,S$GLB,, | Performed by: COLON & RECTAL SURGERY

## 2022-12-19 PROCEDURE — 3008F BODY MASS INDEX DOCD: CPT | Mod: CPTII,S$GLB,, | Performed by: COLON & RECTAL SURGERY

## 2022-12-19 PROCEDURE — 1159F PR MEDICATION LIST DOCUMENTED IN MEDICAL RECORD: ICD-10-PCS | Mod: CPTII,S$GLB,, | Performed by: COLON & RECTAL SURGERY

## 2022-12-19 PROCEDURE — 99999 PR PBB SHADOW E&M-EST. PATIENT-LVL IV: ICD-10-PCS | Mod: PBBFAC,,, | Performed by: COLON & RECTAL SURGERY

## 2022-12-19 PROCEDURE — 3077F PR MOST RECENT SYSTOLIC BLOOD PRESSURE >= 140 MM HG: ICD-10-PCS | Mod: CPTII,S$GLB,, | Performed by: COLON & RECTAL SURGERY

## 2022-12-19 PROCEDURE — 3061F PR NEG MICROALBUMINURIA RESULT DOCUMENTED/REVIEW: ICD-10-PCS | Mod: CPTII,S$GLB,, | Performed by: COLON & RECTAL SURGERY

## 2022-12-19 PROCEDURE — 1125F AMNT PAIN NOTED PAIN PRSNT: CPT | Mod: CPTII,S$GLB,, | Performed by: COLON & RECTAL SURGERY

## 2022-12-19 PROCEDURE — 99215 PR OFFICE/OUTPT VISIT, EST, LEVL V, 40-54 MIN: ICD-10-PCS | Mod: S$GLB,,, | Performed by: COLON & RECTAL SURGERY

## 2022-12-19 PROCEDURE — 3008F PR BODY MASS INDEX (BMI) DOCUMENTED: ICD-10-PCS | Mod: CPTII,S$GLB,, | Performed by: COLON & RECTAL SURGERY

## 2022-12-19 PROCEDURE — 3078F PR MOST RECENT DIASTOLIC BLOOD PRESSURE < 80 MM HG: ICD-10-PCS | Mod: CPTII,S$GLB,, | Performed by: COLON & RECTAL SURGERY

## 2022-12-19 PROCEDURE — 3077F SYST BP >= 140 MM HG: CPT | Mod: CPTII,S$GLB,, | Performed by: COLON & RECTAL SURGERY

## 2022-12-19 PROCEDURE — 3066F PR DOCUMENTATION OF TREATMENT FOR NEPHROPATHY: ICD-10-PCS | Mod: CPTII,S$GLB,, | Performed by: COLON & RECTAL SURGERY

## 2022-12-19 PROCEDURE — 3046F HEMOGLOBIN A1C LEVEL >9.0%: CPT | Mod: CPTII,S$GLB,, | Performed by: COLON & RECTAL SURGERY

## 2022-12-19 PROCEDURE — 1159F MED LIST DOCD IN RCRD: CPT | Mod: CPTII,S$GLB,, | Performed by: COLON & RECTAL SURGERY

## 2022-12-19 PROCEDURE — 99215 OFFICE O/P EST HI 40 MIN: CPT | Mod: S$GLB,,, | Performed by: COLON & RECTAL SURGERY

## 2022-12-19 PROCEDURE — 1125F PR PAIN SEVERITY QUANTIFIED, PAIN PRESENT: ICD-10-PCS | Mod: CPTII,S$GLB,, | Performed by: COLON & RECTAL SURGERY

## 2022-12-19 PROCEDURE — 3066F NEPHROPATHY DOC TX: CPT | Mod: CPTII,S$GLB,, | Performed by: COLON & RECTAL SURGERY

## 2022-12-19 PROCEDURE — 3046F PR MOST RECENT HEMOGLOBIN A1C LEVEL > 9.0%: ICD-10-PCS | Mod: CPTII,S$GLB,, | Performed by: COLON & RECTAL SURGERY

## 2022-12-20 LAB
FINAL PATHOLOGIC DIAGNOSIS: NORMAL
GROSS: NORMAL
Lab: NORMAL
MICROSCOPIC EXAM: NORMAL
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
## 2022-12-28 ENCOUNTER — DOCUMENTATION ONLY (OUTPATIENT)
Dept: SURGERY | Facility: CLINIC | Age: 67
End: 2022-12-28
Payer: MEDICARE

## 2022-12-28 NOTE — PROGRESS NOTES
[facility]  Colon, Rectal and Anal Malignancies  Multi-disciplinary Tumor Board     [address]  [place], [place]  Tel: [phone]  Fax: [phone]  [email or web address]     Patient name: KIRSTY Mason   YOB: 1955   MRN: [id number]    Date of discussion: 12/28/2022    Thank you for referring Mr. Mason to our care here at [facility]. Please allow us to summarize our review of records and recommendations.    The following disciplines were present for the discussion:   Colon and Rectal Surgery  Medical Oncology  Pathology  Radiology    Endoscopy reviewed:   Date performed: 12/5/2022  Yes, complete evaluation of colon and rectum performed    CT Chest, Abdomen and Pelvis   Date performed: 9/12/2022   Performed at our facility: Yes  Metastatic disease present: No    MRI Rectal Cancer Protocol  Date performed: 9/12/2022   Performed at our facility: Yes  Possible complete/near complete response    Pathology reviewed:   Date pathology finalized: 12/5/2022  Yes, report only     Pre-treatment CEA:  Date performed: 9/6/2022  CEA Level:  3.4    Pre-treatment Clinical Stage:  T3 - Tumor invades the muscularis propria  N0 - No regional lymph node metastasis suspected  M0 - No metastasis suspected    Based on our review of the current information we have made the following recommendations:  67M with T3N0M0 rectal cancer s/p short course KORY, MRI with complete/near complete response. Flex sig with malignant appearing mild stenosis however biopsies showing no evidence of dysplasia or malignancy. Recommendation is for surgical resection    Therapies  Surgical resection    Clinical research study eligibility - No    Please do not hesitate to contact us for any questions.

## 2023-01-30 ENCOUNTER — PATIENT MESSAGE (OUTPATIENT)
Dept: SURGERY | Facility: CLINIC | Age: 68
End: 2023-01-30
Payer: MEDICARE
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
## 2023-02-20 ENCOUNTER — OFFICE VISIT (OUTPATIENT)
Dept: SURGERY | Facility: CLINIC | Age: 68
End: 2023-02-20
Payer: MEDICARE

## 2023-02-20 VITALS
RESPIRATION RATE: 16 BRPM | HEIGHT: 73 IN | HEART RATE: 74 BPM | DIASTOLIC BLOOD PRESSURE: 71 MMHG | OXYGEN SATURATION: 97 % | SYSTOLIC BLOOD PRESSURE: 136 MMHG | WEIGHT: 237.88 LBS | BODY MASS INDEX: 31.53 KG/M2 | TEMPERATURE: 97 F

## 2023-02-20 DIAGNOSIS — K62.89 RECTAL MASS: ICD-10-CM

## 2023-02-20 DIAGNOSIS — Z85.048 ENCOUNTER FOR FOLLOW-UP SURVEILLANCE OF RECTAL CANCER: Primary | ICD-10-CM

## 2023-02-20 DIAGNOSIS — Z08 ENCOUNTER FOR FOLLOW-UP SURVEILLANCE OF RECTAL CANCER: Primary | ICD-10-CM

## 2023-02-20 PROCEDURE — 1101F PR PT FALLS ASSESS DOC 0-1 FALLS W/OUT INJ PAST YR: ICD-10-PCS | Mod: CPTII,S$GLB,, | Performed by: COLON & RECTAL SURGERY

## 2023-02-20 PROCEDURE — 3008F PR BODY MASS INDEX (BMI) DOCUMENTED: ICD-10-PCS | Mod: CPTII,S$GLB,, | Performed by: COLON & RECTAL SURGERY

## 2023-02-20 PROCEDURE — 1101F PT FALLS ASSESS-DOCD LE1/YR: CPT | Mod: CPTII,S$GLB,, | Performed by: COLON & RECTAL SURGERY

## 2023-02-20 PROCEDURE — 3008F BODY MASS INDEX DOCD: CPT | Mod: CPTII,S$GLB,, | Performed by: COLON & RECTAL SURGERY

## 2023-02-20 PROCEDURE — 99499 UNLISTED E&M SERVICE: CPT | Mod: S$GLB,,, | Performed by: COLON & RECTAL SURGERY

## 2023-02-20 PROCEDURE — 99215 PR OFFICE/OUTPT VISIT, EST, LEVL V, 40-54 MIN: ICD-10-PCS | Mod: 25,S$GLB,, | Performed by: COLON & RECTAL SURGERY

## 2023-02-20 PROCEDURE — 45331 SIGMOIDOSCOPY AND BIOPSY: CPT | Mod: PT,,, | Performed by: COLON & RECTAL SURGERY

## 2023-02-20 PROCEDURE — 3078F DIAST BP <80 MM HG: CPT | Mod: CPTII,S$GLB,, | Performed by: COLON & RECTAL SURGERY

## 2023-02-20 PROCEDURE — 1126F AMNT PAIN NOTED NONE PRSNT: CPT | Mod: CPTII,S$GLB,, | Performed by: COLON & RECTAL SURGERY

## 2023-02-20 PROCEDURE — 99499 NO LOS: ICD-10-PCS | Mod: S$GLB,,, | Performed by: COLON & RECTAL SURGERY

## 2023-02-20 PROCEDURE — 99999 PR PBB SHADOW E&M-EST. PATIENT-LVL III: ICD-10-PCS | Mod: PBBFAC,,, | Performed by: COLON & RECTAL SURGERY

## 2023-02-20 PROCEDURE — 3075F PR MOST RECENT SYSTOLIC BLOOD PRESS GE 130-139MM HG: ICD-10-PCS | Mod: CPTII,S$GLB,, | Performed by: COLON & RECTAL SURGERY

## 2023-02-20 PROCEDURE — 3288F FALL RISK ASSESSMENT DOCD: CPT | Mod: CPTII,S$GLB,, | Performed by: COLON & RECTAL SURGERY

## 2023-02-20 PROCEDURE — 3288F PR FALLS RISK ASSESSMENT DOCUMENTED: ICD-10-PCS | Mod: CPTII,S$GLB,, | Performed by: COLON & RECTAL SURGERY

## 2023-02-20 PROCEDURE — 1126F PR PAIN SEVERITY QUANTIFIED, NO PAIN PRESENT: ICD-10-PCS | Mod: CPTII,S$GLB,, | Performed by: COLON & RECTAL SURGERY

## 2023-02-20 PROCEDURE — 3078F PR MOST RECENT DIASTOLIC BLOOD PRESSURE < 80 MM HG: ICD-10-PCS | Mod: CPTII,S$GLB,, | Performed by: COLON & RECTAL SURGERY

## 2023-02-20 PROCEDURE — 99215 OFFICE O/P EST HI 40 MIN: CPT | Mod: 25,S$GLB,, | Performed by: COLON & RECTAL SURGERY

## 2023-02-20 PROCEDURE — 45331 PR SIGMOIDOSCOPY,BIOPSY: ICD-10-PCS | Mod: PT,,, | Performed by: COLON & RECTAL SURGERY

## 2023-02-20 PROCEDURE — 99999 PR PBB SHADOW E&M-EST. PATIENT-LVL III: CPT | Mod: PBBFAC,,, | Performed by: COLON & RECTAL SURGERY

## 2023-02-20 PROCEDURE — 3075F SYST BP GE 130 - 139MM HG: CPT | Mod: CPTII,S$GLB,, | Performed by: COLON & RECTAL SURGERY

## 2023-02-22 NOTE — PROVATION PATIENT INSTRUCTIONS
Discharge Summary/Instructions after an Endoscopic Procedure  Patient Name: LIA Mason  Patient MRN: [id number]  Patient Birthdate:   Monday, February 20, 2025  West Hong MD  Dear patient,  As a result of recent federal legislation (The Federal Cures Act), you may   receive lab or pathology results from your procedure in your [facility]   account before your physician is able to contact you. Your physician or   their representative will relay the results to you with their   recommendations at their soonest availability.  Thank you,  RESTRICTIONS:  During your procedure today, you received medications for sedation.  These   medications may affect your judgment, balance and coordination.  Therefore,   for 24 hours, you have the following restrictions:   - DO NOT drive a car, operate machinery, make legal/financial decisions,   sign important papers or drink alcohol.    ACTIVITY:  Today: no heavy lifting, straining or running due to procedural   sedation/anesthesia.  The following day: return to full activity including work.  DIET:  Eat and drink normally unless instructed otherwise.     TREATMENT FOR COMMON SIDE EFFECTS:  - Mild abdominal pain, nausea, belching, bloating or excessive gas:  rest,   eat lightly and use a heating pad.  - Sore Throat: treat with throat lozenges and/or gargle with warm salt   water.  - Because air was used during the procedure, expelling large amounts of air   from your rectum or belching is normal.  - If a bowel prep was taken, you may not have a bowel movement for 1-3 days.    This is normal.  SYMPTOMS TO WATCH FOR AND REPORT TO YOUR PHYSICIAN:  1. Abdominal pain or bloating, other than gas cramps.  2. Chest pain.  3. Back pain.  4. Signs of infection such as: chills or fever occurring within 24 hours   after the procedure.  5. Rectal bleeding, which would show as bright red, maroon, or black stools.   (A tablespoon of blood from the rectum is not serious, especially if   hemorrhoids  are present.)  6. Vomiting.  7. Weakness or dizziness.  GO DIRECTLY TO THE NEAREST EMERGENCY ROOM IF YOU HAVE ANY OF THE FOLLOWING:      Difficulty breathing              Chills and/or fever over 101 F   Persistent vomiting and/or vomiting blood   Severe abdominal pain   Severe chest pain   Black, tarry stools   Bleeding- more than one tablespoon   Any other symptom or condition that you feel may need urgent attention  Your doctor recommends these additional instructions:  If any biopsies were taken, your doctors clinic will contact you in 1 to 2   weeks with any results.  You are being discharged to home.   You have a contact number available for emergencies.  The signs and symptoms   of potential delayed complications were discussed with you.  You may return   to normal activities tomorrow.  Written discharge instructions were   provided to you.   Resume your previous diet.   Continue your present medications.   Your physician has recommended a repeat flexible sigmoidoscopy in three   months for surveillance.  For questions, problems or results please call your physician - West Hong MD at Work:  [phone].  EMERGENCY PHONE NUMBER: [phone], LAB RESULTS: [phone]  IF A COMPLICATION OR EMERGENCY SITUATION ARISES AND YOU ARE UNABLE TO REACH   YOUR PHYSICIAN - GO DIRECTLY TO THE EMERGENCY ROOM.  ___________________________________________  Nurse Signature  ___________________________________________  Patient/Designated Responsible Party Signature  West Hong MD  2/20/2023 5:07:36 PM  This report has been verified and signed electronically.  Dear patient,  As a result of recent federal legislation (The Federal Cures Act), you may   receive lab or pathology results from your procedure in your [facility]   account before your physician is able to contact you. Your physician or   their representative will relay the results to you with their   recommendations at their soonest availability.  Thank  you.  PROVATION

## 2023-03-02 ENCOUNTER — PATIENT MESSAGE (OUTPATIENT)
Dept: HEMATOLOGY/ONCOLOGY | Facility: CLINIC | Age: 68
End: 2023-03-02
Payer: MEDICARE
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
## 2023-03-06 ENCOUNTER — OFFICE VISIT (OUTPATIENT)
Dept: SURGERY | Facility: CLINIC | Age: 68
End: 2023-03-06
Payer: MEDICARE

## 2023-03-06 DIAGNOSIS — C20 RECTAL CANCER: Primary | ICD-10-CM

## 2023-03-06 PROCEDURE — 99214 PR OFFICE/OUTPT VISIT, EST, LEVL IV, 30-39 MIN: ICD-10-PCS | Mod: S$GLB,,, | Performed by: COLON & RECTAL SURGERY

## 2023-03-06 PROCEDURE — 99214 OFFICE O/P EST MOD 30 MIN: CPT | Mod: S$GLB,,, | Performed by: COLON & RECTAL SURGERY

## 2023-03-06 PROCEDURE — 1159F PR MEDICATION LIST DOCUMENTED IN MEDICAL RECORD: ICD-10-PCS | Mod: CPTII,S$GLB,, | Performed by: COLON & RECTAL SURGERY

## 2023-03-06 PROCEDURE — 1159F MED LIST DOCD IN RCRD: CPT | Mod: CPTII,S$GLB,, | Performed by: COLON & RECTAL SURGERY

## 2023-03-06 PROCEDURE — 99999 PR PBB SHADOW E&M-EST. PATIENT-LVL III: CPT | Mod: PBBFAC,,, | Performed by: COLON & RECTAL SURGERY

## 2023-03-06 PROCEDURE — 99999 PR PBB SHADOW E&M-EST. PATIENT-LVL III: ICD-10-PCS | Mod: PBBFAC,,, | Performed by: COLON & RECTAL SURGERY

## 2023-03-08 RX ORDER — CIPROFLOXACIN 500 MG/1
500 TABLET ORAL 2 TIMES DAILY
Qty: 2 TABLET | Refills: 0 | Status: SHIPPED | OUTPATIENT
Start: 2023-03-08 | End: 2023-03-28

## 2023-03-08 RX ORDER — POLYETHYLENE GLYCOL 3350, SODIUM SULFATE ANHYDROUS, SODIUM BICARBONATE, SODIUM CHLORIDE, POTASSIUM CHLORIDE 236; 22.74; 6.74; 5.86; 2.97 G/4L; G/4L; G/4L; G/4L; G/4L
4 POWDER, FOR SOLUTION ORAL ONCE
Qty: 1 ML | Refills: 0 | Status: SHIPPED | OUTPATIENT
Start: 2023-03-08 | End: 2023-03-08

## 2023-03-08 RX ORDER — METRONIDAZOLE 500 MG/1
500 TABLET ORAL 2 TIMES DAILY
Qty: 2 TABLET | Refills: 0 | Status: SHIPPED | OUTPATIENT
Start: 2023-03-08 | End: 2023-06-12 | Stop reason: ALTCHOICE

## 2023-03-28 ENCOUNTER — OFFICE VISIT (OUTPATIENT)
Dept: SURGERY | Facility: CLINIC | Age: 68
End: 2023-03-28
Payer: MEDICARE

## 2023-03-28 ENCOUNTER — LAB VISIT (OUTPATIENT)
Dept: LAB | Facility: HOSPITAL | Age: 68
End: 2023-03-28
Payer: MEDICARE

## 2023-03-28 ENCOUNTER — HOSPITAL ENCOUNTER (OUTPATIENT)
Dept: CARDIOLOGY | Facility: CLINIC | Age: 68
Discharge: HOME | End: 2023-03-28
Payer: MEDICARE

## 2023-03-28 ENCOUNTER — OFFICE VISIT (OUTPATIENT)
Dept: INTERNAL MEDICINE | Facility: CLINIC | Age: 68
End: 2023-03-28
Payer: MEDICARE

## 2023-03-28 VITALS
OXYGEN SATURATION: 98 % | TEMPERATURE: 98 F | DIASTOLIC BLOOD PRESSURE: 71 MMHG | WEIGHT: 238 LBS | HEART RATE: 74 BPM | HEIGHT: 73 IN | SYSTOLIC BLOOD PRESSURE: 156 MMHG | BODY MASS INDEX: 31.54 KG/M2

## 2023-03-28 VITALS
WEIGHT: 236.69 LBS | DIASTOLIC BLOOD PRESSURE: 67 MMHG | SYSTOLIC BLOOD PRESSURE: 125 MMHG | BODY MASS INDEX: 31.23 KG/M2 | HEART RATE: 73 BPM

## 2023-03-28 DIAGNOSIS — D64.9 ANEMIA, UNSPECIFIED TYPE: ICD-10-CM

## 2023-03-28 DIAGNOSIS — R60.0 BILATERAL LOWER EXTREMITY EDEMA: ICD-10-CM

## 2023-03-28 DIAGNOSIS — C20 RECTAL CANCER: ICD-10-CM

## 2023-03-28 DIAGNOSIS — Z71.89 ENCOUNTER FOR OSTOMY CARE EDUCATION: Primary | ICD-10-CM

## 2023-03-28 DIAGNOSIS — R60.0 EDEMA OF RIGHT LOWER LEG DUE TO VENOUS STASIS: ICD-10-CM

## 2023-03-28 DIAGNOSIS — E11.65 UNCONTROLLED TYPE 2 DIABETES MELLITUS WITH HYPERGLYCEMIA: ICD-10-CM

## 2023-03-28 DIAGNOSIS — K21.9 GASTROESOPHAGEAL REFLUX DISEASE, UNSPECIFIED WHETHER ESOPHAGITIS PRESENT: ICD-10-CM

## 2023-03-28 DIAGNOSIS — I87.2 VENOUS STASIS DERMATITIS OF LEFT LOWER EXTREMITY: ICD-10-CM

## 2023-03-28 DIAGNOSIS — Z01.818 PRE-OP EVALUATION: ICD-10-CM

## 2023-03-28 DIAGNOSIS — R03.0 ELEVATED BLOOD PRESSURE READING IN OFFICE WITHOUT DIAGNOSIS OF HYPERTENSION: ICD-10-CM

## 2023-03-28 DIAGNOSIS — I87.2 EDEMA OF RIGHT LOWER LEG DUE TO VENOUS STASIS: ICD-10-CM

## 2023-03-28 DIAGNOSIS — G47.33 OSA (OBSTRUCTIVE SLEEP APNEA): ICD-10-CM

## 2023-03-28 DIAGNOSIS — G62.9 NEUROPATHY: ICD-10-CM

## 2023-03-28 PROBLEM — E11.9 CONTROLLED TYPE 2 DIABETES MELLITUS WITHOUT COMPLICATION, WITHOUT LONG-TERM CURRENT USE OF INSULIN: Status: RESOLVED | Noted: 2019-03-04 | Resolved: 2023-03-28

## 2023-03-28 PROBLEM — R93.89 ABNORMAL CHEST X-RAY: Status: RESOLVED | Noted: 2020-05-20 | Resolved: 2023-03-28

## 2023-03-28 PROBLEM — J12.82 PNEUMONIA DUE TO COVID-19 VIRUS: Status: RESOLVED | Noted: 2022-01-10 | Resolved: 2023-03-28

## 2023-03-28 PROBLEM — U07.1 PNEUMONIA DUE TO COVID-19 VIRUS: Status: RESOLVED | Noted: 2022-01-10 | Resolved: 2023-03-28

## 2023-03-28 PROBLEM — R05.3 CHRONIC COUGH: Status: RESOLVED | Noted: 2020-02-12 | Resolved: 2023-03-28

## 2023-03-28 PROBLEM — M76.60 ACHILLES TENDINITIS: Status: RESOLVED | Noted: 2019-03-04 | Resolved: 2023-03-28

## 2023-03-28 PROBLEM — O16.1 ELEVATED BLOOD PRESSURE AFFECTING PREGNANCY IN FIRST TRIMESTER, ANTEPARTUM: Status: RESOLVED | Noted: 2023-03-28 | Resolved: 2023-03-28

## 2023-03-28 PROBLEM — O16.1 ELEVATED BLOOD PRESSURE AFFECTING PREGNANCY IN FIRST TRIMESTER, ANTEPARTUM: Status: ACTIVE | Noted: 2023-03-28

## 2023-03-28 PROBLEM — R09.02 HYPOXEMIA: Status: RESOLVED | Noted: 2022-01-11 | Resolved: 2023-03-28

## 2023-03-28 LAB
ALBUMIN SERPL BCP-MCNC: 3.7 G/DL (ref 3.5–5.2)
ALP SERPL-CCNC: 179 U/L (ref 55–135)
ALT SERPL W/O P-5'-P-CCNC: 23 U/L (ref 10–44)
ANION GAP SERPL CALC-SCNC: 10 MMOL/L (ref 8–16)
AST SERPL-CCNC: 26 U/L (ref 10–40)
BASOPHILS # BLD AUTO: 0.03 K/UL (ref 0–0.2)
BASOPHILS NFR BLD: 0.5 % (ref 0–1.9)
BILIRUB SERPL-MCNC: 0.5 MG/DL (ref 0.1–1)
BUN SERPL-MCNC: 19 MG/DL (ref 8–23)
CALCIUM SERPL-MCNC: 9.5 MG/DL (ref 8.7–10.5)
CHLORIDE SERPL-SCNC: 98 MMOL/L (ref 95–110)
CO2 SERPL-SCNC: 28 MMOL/L (ref 23–29)
CREAT SERPL-MCNC: 1.3 MG/DL (ref 0.5–1.4)
DIFFERENTIAL METHOD: ABNORMAL
EOSINOPHIL # BLD AUTO: 0.1 K/UL (ref 0–0.5)
EOSINOPHIL NFR BLD: 2 % (ref 0–8)
ERYTHROCYTE [DISTWIDTH] IN BLOOD BY AUTOMATED COUNT: 12.8 % (ref 11.5–14.5)
EST. GFR  (NO RACE VARIABLE): 59.5 ML/MIN/1.73 M^2
ESTIMATED AVG GLUCOSE: 220 MG/DL (ref 68–131)
GLUCOSE SERPL-MCNC: 352 MG/DL (ref 70–110)
HBA1C MFR BLD: 9.3 % (ref 4–5.6)
HCT VFR BLD AUTO: 38.4 % (ref 40–54)
HGB BLD-MCNC: 12.9 G/DL (ref 14–18)
IMM GRANULOCYTES # BLD AUTO: 0.01 K/UL (ref 0–0.04)
IMM GRANULOCYTES NFR BLD AUTO: 0.2 % (ref 0–0.5)
LYMPHOCYTES # BLD AUTO: 1.8 K/UL (ref 1–4.8)
LYMPHOCYTES NFR BLD: 30 % (ref 18–48)
MCH RBC QN AUTO: 31.7 PG (ref 27–31)
MCHC RBC AUTO-ENTMCNC: 33.6 G/DL (ref 32–36)
MCV RBC AUTO: 94 FL (ref 82–98)
MONOCYTES # BLD AUTO: 0.7 K/UL (ref 0.3–1)
MONOCYTES NFR BLD: 10.9 % (ref 4–15)
NEUTROPHILS # BLD AUTO: 3.4 K/UL (ref 1.8–7.7)
NEUTROPHILS NFR BLD: 56.4 % (ref 38–73)
NRBC BLD-RTO: 0 /100 WBC
PLATELET # BLD AUTO: 161 K/UL (ref 150–450)
PMV BLD AUTO: 10.9 FL (ref 9.2–12.9)
POTASSIUM SERPL-SCNC: 4.6 MMOL/L (ref 3.5–5.1)
PROT SERPL-MCNC: 7.2 G/DL (ref 6–8.4)
RBC # BLD AUTO: 4.07 M/UL (ref 4.6–6.2)
SODIUM SERPL-SCNC: 136 MMOL/L (ref 136–145)
WBC # BLD AUTO: 5.97 K/UL (ref 3.9–12.7)

## 2023-03-28 PROCEDURE — 1101F PR PT FALLS ASSESS DOC 0-1 FALLS W/OUT INJ PAST YR: ICD-10-PCS | Mod: CPTII,S$GLB,, | Performed by: NURSE PRACTITIONER

## 2023-03-28 PROCEDURE — 3008F BODY MASS INDEX DOCD: CPT | Mod: CPTII,S$GLB,, | Performed by: NURSE PRACTITIONER

## 2023-03-28 PROCEDURE — 3288F PR FALLS RISK ASSESSMENT DOCUMENTED: ICD-10-PCS | Mod: CPTII,S$GLB,, | Performed by: NURSE PRACTITIONER

## 2023-03-28 PROCEDURE — 1160F PR REVIEW ALL MEDS BY PRESCRIBER/CLIN PHARMACIST DOCUMENTED: ICD-10-PCS | Mod: CPTII,S$GLB,, | Performed by: NURSE PRACTITIONER

## 2023-03-28 PROCEDURE — 1126F AMNT PAIN NOTED NONE PRSNT: CPT | Mod: CPTII,S$GLB,, | Performed by: NURSE PRACTITIONER

## 2023-03-28 PROCEDURE — 1159F PR MEDICATION LIST DOCUMENTED IN MEDICAL RECORD: ICD-10-PCS | Mod: CPTII,S$GLB,, | Performed by: NURSE PRACTITIONER

## 2023-03-28 PROCEDURE — 99999 PR PBB SHADOW E&M-EST. PATIENT-LVL V: CPT | Mod: PBBFAC,,, | Performed by: NURSE PRACTITIONER

## 2023-03-28 PROCEDURE — 3077F PR MOST RECENT SYSTOLIC BLOOD PRESSURE >= 140 MM HG: ICD-10-PCS | Mod: CPTII,S$GLB,, | Performed by: NURSE PRACTITIONER

## 2023-03-28 PROCEDURE — 93005 ELECTROCARDIOGRAM TRACING: CPT | Mod: S$GLB,,, | Performed by: NURSE PRACTITIONER

## 2023-03-28 PROCEDURE — 3078F DIAST BP <80 MM HG: CPT | Mod: CPTII,S$GLB,, | Performed by: NURSE PRACTITIONER

## 2023-03-28 PROCEDURE — 36415 COLL VENOUS BLD VENIPUNCTURE: CPT | Performed by: NURSE PRACTITIONER

## 2023-03-28 PROCEDURE — 85025 COMPLETE CBC W/AUTO DIFF WBC: CPT | Performed by: NURSE PRACTITIONER

## 2023-03-28 PROCEDURE — 99999 PR PBB SHADOW E&M-EST. PATIENT-LVL III: CPT | Mod: PBBFAC,,, | Performed by: NURSE PRACTITIONER

## 2023-03-28 PROCEDURE — 99999 PR PBB SHADOW E&M-EST. PATIENT-LVL V: ICD-10-PCS | Mod: PBBFAC,,, | Performed by: NURSE PRACTITIONER

## 2023-03-28 PROCEDURE — 3078F PR MOST RECENT DIASTOLIC BLOOD PRESSURE < 80 MM HG: ICD-10-PCS | Mod: CPTII,S$GLB,, | Performed by: NURSE PRACTITIONER

## 2023-03-28 PROCEDURE — 3288F FALL RISK ASSESSMENT DOCD: CPT | Mod: CPTII,S$GLB,, | Performed by: NURSE PRACTITIONER

## 2023-03-28 PROCEDURE — 3074F PR MOST RECENT SYSTOLIC BLOOD PRESSURE < 130 MM HG: ICD-10-PCS | Mod: CPTII,S$GLB,, | Performed by: NURSE PRACTITIONER

## 2023-03-28 PROCEDURE — 80053 COMPREHEN METABOLIC PANEL: CPT | Performed by: NURSE PRACTITIONER

## 2023-03-28 PROCEDURE — 3074F SYST BP LT 130 MM HG: CPT | Mod: CPTII,S$GLB,, | Performed by: NURSE PRACTITIONER

## 2023-03-28 PROCEDURE — 1126F PR PAIN SEVERITY QUANTIFIED, NO PAIN PRESENT: ICD-10-PCS | Mod: CPTII,S$GLB,, | Performed by: NURSE PRACTITIONER

## 2023-03-28 PROCEDURE — 99205 OFFICE O/P NEW HI 60 MIN: CPT | Mod: S$GLB,,, | Performed by: NURSE PRACTITIONER

## 2023-03-28 PROCEDURE — 3077F SYST BP >= 140 MM HG: CPT | Mod: CPTII,S$GLB,, | Performed by: NURSE PRACTITIONER

## 2023-03-28 PROCEDURE — 99024 POSTOP FOLLOW-UP VISIT: CPT | Mod: S$GLB,,, | Performed by: NURSE PRACTITIONER

## 2023-03-28 PROCEDURE — 99999 PR PBB SHADOW E&M-EST. PATIENT-LVL III: ICD-10-PCS | Mod: PBBFAC,,, | Performed by: NURSE PRACTITIONER

## 2023-03-28 PROCEDURE — 93010 ELECTROCARDIOGRAM REPORT: CPT | Mod: S$GLB,,, | Performed by: INTERNAL MEDICINE

## 2023-03-28 PROCEDURE — 1101F PT FALLS ASSESS-DOCD LE1/YR: CPT | Mod: CPTII,S$GLB,, | Performed by: NURSE PRACTITIONER

## 2023-03-28 PROCEDURE — 93005 EKG 12-LEAD: ICD-10-PCS | Mod: S$GLB,,, | Performed by: NURSE PRACTITIONER

## 2023-03-28 PROCEDURE — 1160F RVW MEDS BY RX/DR IN RCRD: CPT | Mod: CPTII,S$GLB,, | Performed by: NURSE PRACTITIONER

## 2023-03-28 PROCEDURE — 99024 PR POST-OP FOLLOW-UP VISIT: ICD-10-PCS | Mod: S$GLB,,, | Performed by: NURSE PRACTITIONER

## 2023-03-28 PROCEDURE — 3008F PR BODY MASS INDEX (BMI) DOCUMENTED: ICD-10-PCS | Mod: CPTII,S$GLB,, | Performed by: NURSE PRACTITIONER

## 2023-03-28 PROCEDURE — 83036 HEMOGLOBIN GLYCOSYLATED A1C: CPT | Performed by: NURSE PRACTITIONER

## 2023-03-28 PROCEDURE — 1159F MED LIST DOCD IN RCRD: CPT | Mod: CPTII,S$GLB,, | Performed by: NURSE PRACTITIONER

## 2023-03-28 PROCEDURE — 93010 EKG 12-LEAD: ICD-10-PCS | Mod: S$GLB,,, | Performed by: INTERNAL MEDICINE

## 2023-03-28 PROCEDURE — 99205 PR OFFICE/OUTPT VISIT, NEW, LEVL V, 60-74 MIN: ICD-10-PCS | Mod: S$GLB,,, | Performed by: NURSE PRACTITIONER

## 2023-03-28 RX ORDER — INSULIN LISPRO 100 [IU]/ML
22 INJECTION, SOLUTION INTRAVENOUS; SUBCUTANEOUS 2 TIMES DAILY
COMMUNITY
End: 2023-10-30 | Stop reason: CLARIF

## 2023-03-28 NOTE — ASSESSMENT & PLAN NOTE
Reports he has neuropathy that is resolving in his hands from Chemotherapy  Reports he has Neuropathy in feet from DM

## 2023-03-28 NOTE — PROGRESS NOTES
Pre op Ostomy marking:    This patient was seen today per request Dr. Hong in preparation for upcoming ostomy surgery on 4/6/23.  Explained procedure for stoma siting and rationale. Simulated appliance use with empty pouch provided. Abdomen examined with patient sitting, standing and lying down. Observed abdomen, contours, location of belt line, with in visual field and rectus muscle palpation considered.  Stoma marking/siting was performed per protocol and patient marked with a permanent marker and skin staining with silver nitrate. Explained that final decision for stomal placement is up to surgeon's discretion.       The proposed surgery was discussed and patient educated on expected postoperative course and expectations. The patient was allowed to ask questions and was also given pre-op information kit from  the [employer] for review at home prior to surgery.

## 2023-03-28 NOTE — ASSESSMENT & PLAN NOTE
This client has a possible diagnosis of obstructive sleep apnea (MELY)    STOPBANG score: 3    Instructions were given to avoid the following: sleeping supine, weight gain ,alcoholic beverages , sedative medications, and CNS depressant use as these can all worsen MELY.    Untreated sleep apnea has been shown to increase daytime sleepiness, hypertension, heart disease and stroke which were discussed with the patient at this time    Please use caution with medications that induce respiratory depression in the perioperative period

## 2023-03-28 NOTE — Clinical Note
Mr. Mason was seen in the pre-op center for optimization.  His A1c is 9.3 but I am assuming with the time sensitive nature of his diagnosis you will be proceeding with surgery?  His other conditions are optimized at this time.  Arnaud MIDDLETON
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
## 2023-03-28 NOTE — ASSESSMENT & PLAN NOTE
Left leg has +3 LE edema  Has redness to his left shin with skin changes    Referral to vascular for f/u

## 2023-03-28 NOTE — ASSESSMENT & PLAN NOTE
DM x 13  years.   Currently takes:   Insulin Levmir  Insulin Lispro 22 unit with B-fast and lunch      Most recent A1c is 9.3 .    Average daily accuchecks are 180  Reports peripheral neuropathy.  Denies visual changes.    Maintain healthy weight. Exercise at least 150 minutes weekly. Encouraged diet rich in nutrients such as fruits, vegetables, and whole grains; reduce sugar intake from cakes, candy, and sugared drinks.    Micro - Retinopathy, Nephropathy   Macro- Carotid, Coronary , Peripheral disease

## 2023-03-28 NOTE — ASSESSMENT & PLAN NOTE
Currently being treated with Protonix  Encouraged to elevate HOB and avoid laying down for 2-3 hours after meals.   Weight loss encouraged as well as dietary changes such as reduction or avoidance of fatty foods, caffeine, spicy foods, and chocolate.    Smoking cessation was recommended as well as reduction of alcohol consumption.

## 2023-03-28 NOTE — DISCHARGE INSTRUCTIONS
Your surgery has been scheduled for:______4/6/23____________________________________    You should report to:  ____[facility], located on the [place] side of the first floor of the           [facility] ([phone])  __X__[facility], located on the [facility] side of the            Second floor of the [facility] ([phone])  ____3rd Floor [facility] located on the [facility] side of the [facility] [phone]  ____[facility]: located at [address] [place] [address]. [address]     Please Note   Tell your doctor if you take Aspirin, products containing Aspirin, herbal medications  or blood thinners, such as Coumadin, Ticlid, or Plavix.  (Consult your provider regarding holding or stopping before surgery).  Arrange for someone to drive you home following surgery.  You will not be allowed to leave the surgical facility alone or drive yourself home following sedation and anesthesia.    Before Surgery  Stop taking all herbal medications, vitamins, and supplements 7 days prior to surgery  No Motrin/Advil (Ibuprofen) 7 days before surgery  No Aleve (Naproxen) 7 days before surgery  Stop Taking Asprin, products containing Aspirin ___7__days before surgery   No Goody's/BC Powder 7 days before surgery  Refrain from drinking alcoholic beverages for 24 hours before and after surgery  Stop or limit smoking at least 24 hours prior to surgery  You may take Tylenol for pain    Night before Surgery  Do not eat or drink after midnight  Take a shower or bath (shower is recommended).  Bathe with Hibiclens soap or an antibacterial soap from the neck down.  If not supplied by your surgeon, hibiclens soap will need to be purchased over the counter in pharmacy.  Rinse soap off thoroughly.  Shampoo your hair with your regular shampoo    The Day of Surgery  Take another bath or shower with hibiclens or  any antibacterial soap, to reduce the chance of infection.  Take heart and blood pressure medications with a small sip of water, as advised by the perioperative team.  Do not take fluid pills  You may brush your teeth and rinse your mouth, but do not swallow any additional water.   Do not apply perfumes, powder, body lotions or deodorant on the day of surgery.  Nail polish should be removed.  Do not wear makeup or moisturizer  Wear comfortable clothes, such as a button front shirt and loose fitting pants.  Leave all jewelry, including body piercings, and valuables at home.    Bring any devices you will neeed after surgery such as crutches or canes.  If you have sleep apnea, please bring your CPAP machine  In the event that your physical condition changes including the onset of a cold or respiratory illness, or if you have to delay or cancel your surgery, please notify your surgeon.

## 2023-03-28 NOTE — PROGRESS NOTES
Patient, KIRSTY Mason (MRN #[id number]), presented with a recent Platelet count less than 150 K/uL consistent with the definition of thrombocytopenia (ICD10 - D69.6).    Platelets   Date Value Ref Range Status   10/10/2022 107 (L) 150 - 450 K/uL Final     The patient's thrombocytopenia was monitored, evaluated, addressed and/or treated. This addendum to the medical record is made on 03/28/2023.

## 2023-03-28 NOTE — HPI
This is a 67 y.o. male  who presents today for a preoperative evaluation in preparation for a Colon and Rectal  procedure.  Scheduled for  4/6/23  Surgery is indicated for  Robotic resection colon low anterior   Patient is new to me.  Details of current problem: The duration of problem is about 1 year  Reports symptoms of  had bowel changes- blood in stool  Aggravating factors include:  Metformin   Relieving factors are n/a     Treated with Chemo and radiation  Reports pain: none  The history has been obtained by speaking with the patient and reviewing the electronic medical record and/or outside health information. Significant health conditions for the perioperative period are discussed below in assessment and plan.     Patient reports current health status to be Fair.  Denies any new symptoms before surgery. He has some knee pain

## 2023-03-29 PROBLEM — D64.9 ANEMIA: Status: ACTIVE | Noted: 2022-01-11

## 2023-03-29 NOTE — ASSESSMENT & PLAN NOTE
Component      Latest Ref Rng & Units 3/28/2023   RBC      4.60 - 6.20 M/uL 4.07 (L)   Hemoglobin      14.0 - 18.0 g/dL 12.9 (L)   Hematocrit      40.0 - 54.0 % 38.4 (L)   MCV      82 - 98 fL 94   MCH      27.0 - 31.0 pg 31.7 (H)   MCHC      32.0 - 36.0 g/dL 33.6

## 2023-04-05 ENCOUNTER — TELEPHONE (OUTPATIENT)
Dept: SURGERY | Facility: CLINIC | Age: 68
End: 2023-04-05
Payer: MEDICARE

## 2023-04-05 ENCOUNTER — ANESTHESIA EVENT (OUTPATIENT)
Dept: SURGERY | Facility: HOSPITAL | Age: 68
DRG: 331 | End: 2023-04-05
Payer: MEDICARE

## 2023-04-05 NOTE — ANESTHESIA PREPROCEDURE EVALUATION
04/05/2023  KIRSTY Mason is a 67 y.o., male.  Patient Active Problem List   Diagnosis    Dyslipidemia    Gastroesophageal reflux disease    Rhinosinusitis    Uncontrolled type 2 diabetes mellitus with hyperglycemia    Controlled type 2 diabetes mellitus with both eyes affected by mild nonproliferative retinopathy without macular edema, without long-term current use of insulin    Hypertensive retinopathy of both eyes    Anemia    Rectal cancer    Carotid arterial disease    MELY (obstructive sleep apnea)    Venous stasis dermatitis of left lower extremity    Bilateral lower extremity edema    Neuropathy    Elevated blood pressure reading in office without diagnosis of hypertension    Pre-op evaluation           Pre-op Assessment          Review of Systems      Physical Exam    Airway:  No airway management difficulties anticipated  Dental:No active dental issues noted  Chest/Lungs:  Clear to auscultation    Heart:  Rate: Normal  Rhythm: Regular Rhythm  Sounds: Normal        Anesthesia Plan  Type of Anesthesia, risks & benefits discussed:    Anesthesia Type: Gen ETT  Intra-op Monitoring Plan: Art Line  Informed Consent: Informed consent signed with the Patient and all parties understand the risks and agree with anesthesia plan.  All questions answered.   ASA Score: 3  Anesthesia Plan Notes: Chart reviewed. Patient seen and examined. Anesthesia plan discussed and questions answered. E-consent signed. Justin Sanders MD    Ready For Surgery From Anesthesia Perspective.     .

## 2023-04-06 ENCOUNTER — ANESTHESIA (OUTPATIENT)
Dept: SURGERY | Facility: HOSPITAL | Age: 68
DRG: 331 | End: 2023-04-06
Payer: MEDICARE

## 2023-04-06 ENCOUNTER — HOSPITAL ENCOUNTER (INPATIENT)
Facility: HOSPITAL | Age: 68
LOS: 5 days | Discharge: HOME HEALTH | DRG: 331 | End: 2023-04-11
Attending: COLON & RECTAL SURGERY | Admitting: COLON & RECTAL SURGERY
Payer: MEDICARE

## 2023-04-06 DIAGNOSIS — G47.33 OSA (OBSTRUCTIVE SLEEP APNEA): ICD-10-CM

## 2023-04-06 DIAGNOSIS — E78.5 DYSLIPIDEMIA: ICD-10-CM

## 2023-04-06 DIAGNOSIS — C20 RECTAL CANCER: Primary | ICD-10-CM

## 2023-04-06 DIAGNOSIS — E11.65 UNCONTROLLED TYPE 2 DIABETES MELLITUS WITH HYPERGLYCEMIA: ICD-10-CM

## 2023-04-06 LAB
ABO + RH BLD: NORMAL
ANION GAP SERPL CALC-SCNC: 9 MMOL/L (ref 8–16)
BLD GP AB SCN CELLS X3 SERPL QL: NORMAL
BUN SERPL-MCNC: 18 MG/DL (ref 8–23)
CALCIUM SERPL-MCNC: 8.2 MG/DL (ref 8.7–10.5)
CHLORIDE SERPL-SCNC: 101 MMOL/L (ref 95–110)
CO2 SERPL-SCNC: 23 MMOL/L (ref 23–29)
CREAT SERPL-MCNC: 1 MG/DL (ref 0.5–1.4)
EST. GFR  (NO RACE VARIABLE): >60 ML/MIN/1.73 M^2
GLUCOSE SERPL-MCNC: 332 MG/DL (ref 70–110)
POCT GLUCOSE: 231 MG/DL (ref 70–110)
POCT GLUCOSE: 329 MG/DL (ref 70–110)
POCT GLUCOSE: 343 MG/DL (ref 70–110)
POTASSIUM SERPL-SCNC: 4.2 MMOL/L (ref 3.5–5.1)
SODIUM SERPL-SCNC: 133 MMOL/L (ref 136–145)
SPECIMEN OUTDATE: NORMAL

## 2023-04-06 PROCEDURE — C1765 ADHESION BARRIER: HCPCS | Performed by: COLON & RECTAL SURGERY

## 2023-04-06 PROCEDURE — 25000003 PHARM REV CODE 250: Performed by: NURSE PRACTITIONER

## 2023-04-06 PROCEDURE — 37000009 HC ANESTHESIA EA ADD 15 MINS: Performed by: COLON & RECTAL SURGERY

## 2023-04-06 PROCEDURE — 88309 TISSUE EXAM BY PATHOLOGIST: CPT | Performed by: STUDENT IN AN ORGANIZED HEALTH CARE EDUCATION/TRAINING PROGRAM

## 2023-04-06 PROCEDURE — 71000039 HC RECOVERY, EACH ADD'L HOUR: Performed by: COLON & RECTAL SURGERY

## 2023-04-06 PROCEDURE — D9220A PRA ANESTHESIA: ICD-10-PCS | Mod: ANES,,, | Performed by: ANESTHESIOLOGY

## 2023-04-06 PROCEDURE — 88309 TISSUE EXAM BY PATHOLOGIST: CPT | Mod: 26,,, | Performed by: STUDENT IN AN ORGANIZED HEALTH CARE EDUCATION/TRAINING PROGRAM

## 2023-04-06 PROCEDURE — 27201423 OPTIME MED/SURG SUP & DEVICES STERILE SUPPLY: Performed by: COLON & RECTAL SURGERY

## 2023-04-06 PROCEDURE — 36415 COLL VENOUS BLD VENIPUNCTURE: CPT | Performed by: STUDENT IN AN ORGANIZED HEALTH CARE EDUCATION/TRAINING PROGRAM

## 2023-04-06 PROCEDURE — 63600175 PHARM REV CODE 636 W HCPCS: Performed by: STUDENT IN AN ORGANIZED HEALTH CARE EDUCATION/TRAINING PROGRAM

## 2023-04-06 PROCEDURE — 63600175 PHARM REV CODE 636 W HCPCS: Performed by: NURSE PRACTITIONER

## 2023-04-06 PROCEDURE — 88305 TISSUE EXAM BY PATHOLOGIST: CPT | Mod: 59 | Performed by: STUDENT IN AN ORGANIZED HEALTH CARE EDUCATION/TRAINING PROGRAM

## 2023-04-06 PROCEDURE — 94761 N-INVAS EAR/PLS OXIMETRY MLT: CPT

## 2023-04-06 PROCEDURE — 82962 GLUCOSE BLOOD TEST: CPT

## 2023-04-06 PROCEDURE — 71000016 HC POSTOP RECOV ADDL HR: Performed by: COLON & RECTAL SURGERY

## 2023-04-06 PROCEDURE — 36000711: Performed by: COLON & RECTAL SURGERY

## 2023-04-06 PROCEDURE — 86900 BLOOD TYPING SEROLOGIC ABO: CPT | Performed by: NURSE PRACTITIONER

## 2023-04-06 PROCEDURE — 44208 PR LAP,SURG,COLECTOMY,W/ANAST,W/COLOSTOMY: ICD-10-PCS | Mod: ,,, | Performed by: COLON & RECTAL SURGERY

## 2023-04-06 PROCEDURE — 36415 COLL VENOUS BLD VENIPUNCTURE: CPT | Performed by: COLON & RECTAL SURGERY

## 2023-04-06 PROCEDURE — 63600175 PHARM REV CODE 636 W HCPCS: Performed by: REGISTERED NURSE

## 2023-04-06 PROCEDURE — 25000003 PHARM REV CODE 250: Performed by: COLON & RECTAL SURGERY

## 2023-04-06 PROCEDURE — 80048 BASIC METABOLIC PNL TOTAL CA: CPT | Performed by: STUDENT IN AN ORGANIZED HEALTH CARE EDUCATION/TRAINING PROGRAM

## 2023-04-06 PROCEDURE — 25000003 PHARM REV CODE 250: Performed by: REGISTERED NURSE

## 2023-04-06 PROCEDURE — 27201037 HC PRESSURE MONITORING SET UP

## 2023-04-06 PROCEDURE — 44208 L COLECTOMY/COLOPROCTOSTOMY: CPT | Mod: ,,, | Performed by: COLON & RECTAL SURGERY

## 2023-04-06 PROCEDURE — 85025 COMPLETE CBC W/AUTO DIFF WBC: CPT | Performed by: STUDENT IN AN ORGANIZED HEALTH CARE EDUCATION/TRAINING PROGRAM

## 2023-04-06 PROCEDURE — 88305 TISSUE EXAM BY PATHOLOGIST: CPT | Mod: 26,,, | Performed by: STUDENT IN AN ORGANIZED HEALTH CARE EDUCATION/TRAINING PROGRAM

## 2023-04-06 PROCEDURE — S0030 INJECTION, METRONIDAZOLE: HCPCS | Performed by: NURSE PRACTITIONER

## 2023-04-06 PROCEDURE — D9220A PRA ANESTHESIA: Mod: ANES,,, | Performed by: ANESTHESIOLOGY

## 2023-04-06 PROCEDURE — 37000008 HC ANESTHESIA 1ST 15 MINUTES: Performed by: COLON & RECTAL SURGERY

## 2023-04-06 PROCEDURE — 20600001 HC STEP DOWN PRIVATE ROOM

## 2023-04-06 PROCEDURE — 71000033 HC RECOVERY, INTIAL HOUR: Performed by: COLON & RECTAL SURGERY

## 2023-04-06 PROCEDURE — 71000015 HC POSTOP RECOV 1ST HR: Performed by: COLON & RECTAL SURGERY

## 2023-04-06 PROCEDURE — 25000003 PHARM REV CODE 250: Performed by: STUDENT IN AN ORGANIZED HEALTH CARE EDUCATION/TRAINING PROGRAM

## 2023-04-06 PROCEDURE — 82962 GLUCOSE BLOOD TEST: CPT | Performed by: COLON & RECTAL SURGERY

## 2023-04-06 PROCEDURE — D9220A PRA ANESTHESIA: Mod: CRNA,,, | Performed by: REGISTERED NURSE

## 2023-04-06 PROCEDURE — 44213 LAP MOBIL SPLENIC FL ADD-ON: CPT | Mod: ,,, | Performed by: COLON & RECTAL SURGERY

## 2023-04-06 PROCEDURE — 36000710: Performed by: COLON & RECTAL SURGERY

## 2023-04-06 PROCEDURE — 44213 PR LAP, SURG MOBIL SPLENIC FL DUR PTL COLECTOMY: ICD-10-PCS | Mod: ,,, | Performed by: COLON & RECTAL SURGERY

## 2023-04-06 PROCEDURE — D9220A PRA ANESTHESIA: ICD-10-PCS | Mod: CRNA,,, | Performed by: REGISTERED NURSE

## 2023-04-06 PROCEDURE — 88305 TISSUE EXAM BY PATHOLOGIST: ICD-10-PCS | Mod: 26,,, | Performed by: STUDENT IN AN ORGANIZED HEALTH CARE EDUCATION/TRAINING PROGRAM

## 2023-04-06 PROCEDURE — 88309 PR  SURG PATH,LEVEL VI: ICD-10-PCS | Mod: 26,,, | Performed by: STUDENT IN AN ORGANIZED HEALTH CARE EDUCATION/TRAINING PROGRAM

## 2023-04-06 DEVICE — MEMBRANE SEPRAFILM 5 X 6: Type: IMPLANTABLE DEVICE | Site: ABDOMEN | Status: FUNCTIONAL

## 2023-04-06 RX ORDER — KETAMINE HCL IN 0.9 % NACL 50 MG/5 ML
SYRINGE (ML) INTRAVENOUS
Status: DISCONTINUED | OUTPATIENT
Start: 2023-04-06 | End: 2023-04-06

## 2023-04-06 RX ORDER — HYDRALAZINE HYDROCHLORIDE 20 MG/ML
INJECTION INTRAMUSCULAR; INTRAVENOUS
Status: COMPLETED
Start: 2023-04-06 | End: 2023-04-11

## 2023-04-06 RX ORDER — LIDOCAINE HYDROCHLORIDE 10 MG/ML
1 INJECTION, SOLUTION EPIDURAL; INFILTRATION; INTRACAUDAL; PERINEURAL
Status: DISCONTINUED | OUTPATIENT
Start: 2023-04-06 | End: 2023-04-06

## 2023-04-06 RX ORDER — OXYCODONE HYDROCHLORIDE 5 MG/1
5 TABLET ORAL EVERY 4 HOURS PRN
Status: DISCONTINUED | OUTPATIENT
Start: 2023-04-06 | End: 2023-04-11 | Stop reason: HOSPADM

## 2023-04-06 RX ORDER — OXYCODONE HYDROCHLORIDE 10 MG/1
10 TABLET ORAL EVERY 4 HOURS PRN
Status: DISCONTINUED | OUTPATIENT
Start: 2023-04-06 | End: 2023-04-11 | Stop reason: HOSPADM

## 2023-04-06 RX ORDER — ACETAMINOPHEN 500 MG
1000 TABLET ORAL EVERY 8 HOURS
Status: DISCONTINUED | OUTPATIENT
Start: 2023-04-07 | End: 2023-04-11 | Stop reason: HOSPADM

## 2023-04-06 RX ORDER — PHENYLEPHRINE HYDROCHLORIDE 10 MG/ML
INJECTION INTRAVENOUS
Status: DISCONTINUED | OUTPATIENT
Start: 2023-04-06 | End: 2023-04-06

## 2023-04-06 RX ORDER — MUPIROCIN 20 MG/G
1 OINTMENT TOPICAL
Status: COMPLETED | OUTPATIENT
Start: 2023-04-06 | End: 2023-04-06

## 2023-04-06 RX ORDER — ROCURONIUM BROMIDE 10 MG/ML
INJECTION, SOLUTION INTRAVENOUS
Status: DISCONTINUED | OUTPATIENT
Start: 2023-04-06 | End: 2023-04-06

## 2023-04-06 RX ORDER — TRAMADOL HYDROCHLORIDE 50 MG/1
50 TABLET ORAL EVERY 4 HOURS PRN
Status: DISCONTINUED | OUTPATIENT
Start: 2023-04-06 | End: 2023-04-11 | Stop reason: HOSPADM

## 2023-04-06 RX ORDER — CETIRIZINE HYDROCHLORIDE 10 MG/1
10 TABLET ORAL DAILY
Status: DISCONTINUED | OUTPATIENT
Start: 2023-04-06 | End: 2023-04-11 | Stop reason: HOSPADM

## 2023-04-06 RX ORDER — PROPOFOL 10 MG/ML
VIAL (ML) INTRAVENOUS
Status: DISCONTINUED | OUTPATIENT
Start: 2023-04-06 | End: 2023-04-06

## 2023-04-06 RX ORDER — ACETAMINOPHEN 650 MG/20.3ML
975 LIQUID ORAL
Status: COMPLETED | OUTPATIENT
Start: 2023-04-06 | End: 2023-04-06

## 2023-04-06 RX ORDER — FENTANYL CITRATE 50 UG/ML
25 INJECTION, SOLUTION INTRAMUSCULAR; INTRAVENOUS EVERY 5 MIN PRN
Status: DISCONTINUED | OUTPATIENT
Start: 2023-04-06 | End: 2023-04-06 | Stop reason: HOSPADM

## 2023-04-06 RX ORDER — GABAPENTIN 300 MG/1
300 CAPSULE ORAL 3 TIMES DAILY
Status: DISCONTINUED | OUTPATIENT
Start: 2023-04-06 | End: 2023-04-06 | Stop reason: SDUPTHER

## 2023-04-06 RX ORDER — SODIUM CHLORIDE 9 MG/ML
INJECTION, SOLUTION INTRAVENOUS CONTINUOUS
Status: DISCONTINUED | OUTPATIENT
Start: 2023-04-06 | End: 2023-04-07

## 2023-04-06 RX ORDER — GABAPENTIN 300 MG/1
300 CAPSULE ORAL
Status: COMPLETED | OUTPATIENT
Start: 2023-04-06 | End: 2023-04-06

## 2023-04-06 RX ORDER — FENTANYL CITRATE 50 UG/ML
INJECTION, SOLUTION INTRAMUSCULAR; INTRAVENOUS
Status: DISCONTINUED | OUTPATIENT
Start: 2023-04-06 | End: 2023-04-06

## 2023-04-06 RX ORDER — DIPHENHYDRAMINE HYDROCHLORIDE 50 MG/ML
25 INJECTION INTRAMUSCULAR; INTRAVENOUS EVERY 6 HOURS PRN
Status: DISCONTINUED | OUTPATIENT
Start: 2023-04-06 | End: 2023-04-06 | Stop reason: HOSPADM

## 2023-04-06 RX ORDER — SODIUM CHLORIDE 0.9 % (FLUSH) 0.9 %
10 SYRINGE (ML) INJECTION
Status: DISCONTINUED | OUTPATIENT
Start: 2023-04-06 | End: 2023-04-11 | Stop reason: HOSPADM

## 2023-04-06 RX ORDER — ENOXAPARIN SODIUM 100 MG/ML
40 INJECTION SUBCUTANEOUS EVERY 24 HOURS
Status: DISCONTINUED | OUTPATIENT
Start: 2023-04-07 | End: 2023-04-07

## 2023-04-06 RX ORDER — IBUPROFEN 400 MG/1
800 TABLET ORAL EVERY 8 HOURS
Status: DISCONTINUED | OUTPATIENT
Start: 2023-04-07 | End: 2023-04-11 | Stop reason: HOSPADM

## 2023-04-06 RX ORDER — BUPIVACAINE HYDROCHLORIDE 2.5 MG/ML
INJECTION, SOLUTION EPIDURAL; INFILTRATION; INTRACAUDAL
Status: DISCONTINUED | OUTPATIENT
Start: 2023-04-06 | End: 2023-04-06 | Stop reason: HOSPADM

## 2023-04-06 RX ORDER — GLUCAGON 1 MG
1 KIT INJECTION
Status: DISCONTINUED | OUTPATIENT
Start: 2023-04-06 | End: 2023-04-10

## 2023-04-06 RX ORDER — ALVIMOPAN 12 MG/1
12 CAPSULE ORAL 2 TIMES DAILY
Status: DISCONTINUED | OUTPATIENT
Start: 2023-04-06 | End: 2023-04-08

## 2023-04-06 RX ORDER — DEXTROSE 40 %
30 GEL (GRAM) ORAL
Status: DISCONTINUED | OUTPATIENT
Start: 2023-04-06 | End: 2023-04-07

## 2023-04-06 RX ORDER — HEPARIN SODIUM 5000 [USP'U]/ML
5000 INJECTION, SOLUTION INTRAVENOUS; SUBCUTANEOUS EVERY 8 HOURS
Status: COMPLETED | OUTPATIENT
Start: 2023-04-06 | End: 2023-04-06

## 2023-04-06 RX ORDER — EPHEDRINE SULFATE 50 MG/ML
INJECTION, SOLUTION INTRAVENOUS
Status: DISCONTINUED | OUTPATIENT
Start: 2023-04-06 | End: 2023-04-06

## 2023-04-06 RX ORDER — HYDROMORPHONE HYDROCHLORIDE 1 MG/ML
0.2 INJECTION, SOLUTION INTRAMUSCULAR; INTRAVENOUS; SUBCUTANEOUS EVERY 5 MIN PRN
Status: DISCONTINUED | OUTPATIENT
Start: 2023-04-06 | End: 2023-04-06 | Stop reason: HOSPADM

## 2023-04-06 RX ORDER — INSULIN ASPART 100 [IU]/ML
1-10 INJECTION, SOLUTION INTRAVENOUS; SUBCUTANEOUS
Status: DISCONTINUED | OUTPATIENT
Start: 2023-04-06 | End: 2023-04-07

## 2023-04-06 RX ORDER — GABAPENTIN 300 MG/1
300 CAPSULE ORAL 3 TIMES DAILY
Status: DISCONTINUED | OUTPATIENT
Start: 2023-04-06 | End: 2023-04-11 | Stop reason: HOSPADM

## 2023-04-06 RX ORDER — ONDANSETRON 2 MG/ML
4 INJECTION INTRAMUSCULAR; INTRAVENOUS EVERY 6 HOURS PRN
Status: DISCONTINUED | OUTPATIENT
Start: 2023-04-06 | End: 2023-04-11 | Stop reason: HOSPADM

## 2023-04-06 RX ORDER — SODIUM CHLORIDE 9 MG/ML
INJECTION, SOLUTION INTRAVENOUS CONTINUOUS
Status: DISCONTINUED | OUTPATIENT
Start: 2023-04-06 | End: 2023-04-06 | Stop reason: SDUPTHER

## 2023-04-06 RX ORDER — LIDOCAINE HYDROCHLORIDE 20 MG/ML
INJECTION INTRAVENOUS
Status: DISCONTINUED | OUTPATIENT
Start: 2023-04-06 | End: 2023-04-06

## 2023-04-06 RX ORDER — METRONIDAZOLE 500 MG/100ML
500 INJECTION, SOLUTION INTRAVENOUS
Status: COMPLETED | OUTPATIENT
Start: 2023-04-06 | End: 2023-04-06

## 2023-04-06 RX ORDER — DEXMEDETOMIDINE HYDROCHLORIDE 100 UG/ML
INJECTION, SOLUTION INTRAVENOUS
Status: DISCONTINUED | OUTPATIENT
Start: 2023-04-06 | End: 2023-04-06

## 2023-04-06 RX ORDER — METOPROLOL TARTRATE 1 MG/ML
INJECTION, SOLUTION INTRAVENOUS
Status: DISCONTINUED | OUTPATIENT
Start: 2023-04-06 | End: 2023-04-06

## 2023-04-06 RX ORDER — HYDROMORPHONE HYDROCHLORIDE 1 MG/ML
INJECTION, SOLUTION INTRAMUSCULAR; INTRAVENOUS; SUBCUTANEOUS
Status: DISCONTINUED | OUTPATIENT
Start: 2023-04-06 | End: 2023-04-06

## 2023-04-06 RX ORDER — MUPIROCIN 20 MG/G
OINTMENT TOPICAL 2 TIMES DAILY
Status: DISCONTINUED | OUTPATIENT
Start: 2023-04-06 | End: 2023-04-11 | Stop reason: HOSPADM

## 2023-04-06 RX ORDER — MIDAZOLAM HYDROCHLORIDE 1 MG/ML
INJECTION INTRAMUSCULAR; INTRAVENOUS
Status: DISCONTINUED | OUTPATIENT
Start: 2023-04-06 | End: 2023-04-06

## 2023-04-06 RX ORDER — PROCHLORPERAZINE EDISYLATE 5 MG/ML
5 INJECTION INTRAMUSCULAR; INTRAVENOUS EVERY 6 HOURS PRN
Status: DISCONTINUED | OUTPATIENT
Start: 2023-04-06 | End: 2023-04-11 | Stop reason: HOSPADM

## 2023-04-06 RX ORDER — DEXTROSE 40 %
15 GEL (GRAM) ORAL
Status: DISCONTINUED | OUTPATIENT
Start: 2023-04-06 | End: 2023-04-07

## 2023-04-06 RX ORDER — ONDANSETRON 2 MG/ML
INJECTION INTRAMUSCULAR; INTRAVENOUS
Status: DISCONTINUED | OUTPATIENT
Start: 2023-04-06 | End: 2023-04-06

## 2023-04-06 RX ORDER — ACETAMINOPHEN 10 MG/ML
1000 INJECTION, SOLUTION INTRAVENOUS EVERY 8 HOURS
Status: DISPENSED | OUTPATIENT
Start: 2023-04-06 | End: 2023-04-07

## 2023-04-06 RX ORDER — SODIUM CHLORIDE 9 MG/ML
INJECTION, SOLUTION INTRAVENOUS
Status: COMPLETED | OUTPATIENT
Start: 2023-04-06 | End: 2023-04-06

## 2023-04-06 RX ORDER — DEXAMETHASONE SODIUM PHOSPHATE 4 MG/ML
INJECTION, SOLUTION INTRA-ARTICULAR; INTRALESIONAL; INTRAMUSCULAR; INTRAVENOUS; SOFT TISSUE
Status: DISCONTINUED | OUTPATIENT
Start: 2023-04-06 | End: 2023-04-06

## 2023-04-06 RX ORDER — TRIPROLIDINE/PSEUDOEPHEDRINE 2.5MG-60MG
600 TABLET ORAL
Status: COMPLETED | OUTPATIENT
Start: 2023-04-06 | End: 2023-04-06

## 2023-04-06 RX ORDER — ONDANSETRON 2 MG/ML
4 INJECTION INTRAMUSCULAR; INTRAVENOUS ONCE AS NEEDED
Status: DISCONTINUED | OUTPATIENT
Start: 2023-04-06 | End: 2023-04-06 | Stop reason: HOSPADM

## 2023-04-06 RX ADMIN — IBUPROFEN 600 MG: 200 SUSPENSION ORAL at 06:04

## 2023-04-06 RX ADMIN — Medication 10 MG: at 08:04

## 2023-04-06 RX ADMIN — PROPOFOL 60 MG: 10 INJECTION, EMULSION INTRAVENOUS at 08:04

## 2023-04-06 RX ADMIN — FENTANYL CITRATE 50 MCG: 50 INJECTION, SOLUTION INTRAMUSCULAR; INTRAVENOUS at 08:04

## 2023-04-06 RX ADMIN — HYDROMORPHONE HYDROCHLORIDE 0.2 MG: 1 INJECTION, SOLUTION INTRAMUSCULAR; INTRAVENOUS; SUBCUTANEOUS at 10:04

## 2023-04-06 RX ADMIN — PHENYLEPHRINE HYDROCHLORIDE 100 MCG: 10 INJECTION INTRAVENOUS at 11:04

## 2023-04-06 RX ADMIN — PROPOFOL 130 MG: 10 INJECTION, EMULSION INTRAVENOUS at 07:04

## 2023-04-06 RX ADMIN — ONDANSETRON 4 MG: 2 INJECTION INTRAMUSCULAR; INTRAVENOUS at 01:04

## 2023-04-06 RX ADMIN — PHENYLEPHRINE HYDROCHLORIDE 200 MCG: 10 INJECTION INTRAVENOUS at 01:04

## 2023-04-06 RX ADMIN — ROCURONIUM BROMIDE 10 MG: 10 INJECTION, SOLUTION INTRAVENOUS at 11:04

## 2023-04-06 RX ADMIN — GABAPENTIN 300 MG: 300 CAPSULE ORAL at 03:04

## 2023-04-06 RX ADMIN — FENTANYL CITRATE 50 MCG: 50 INJECTION, SOLUTION INTRAMUSCULAR; INTRAVENOUS at 02:04

## 2023-04-06 RX ADMIN — INSULIN ASPART 4 UNITS: 100 INJECTION, SOLUTION INTRAVENOUS; SUBCUTANEOUS at 10:04

## 2023-04-06 RX ADMIN — MUPIROCIN: 20 OINTMENT TOPICAL at 10:04

## 2023-04-06 RX ADMIN — LIDOCAINE HYDROCHLORIDE 50 MG: 20 INJECTION INTRAVENOUS at 07:04

## 2023-04-06 RX ADMIN — FENTANYL CITRATE 100 MCG: 50 INJECTION, SOLUTION INTRAMUSCULAR; INTRAVENOUS at 07:04

## 2023-04-06 RX ADMIN — ROCURONIUM BROMIDE 10 MG: 10 INJECTION, SOLUTION INTRAVENOUS at 12:04

## 2023-04-06 RX ADMIN — EPHEDRINE SULFATE 10 MG: 50 INJECTION INTRAVENOUS at 07:04

## 2023-04-06 RX ADMIN — Medication 10 MG: at 11:04

## 2023-04-06 RX ADMIN — GABAPENTIN 300 MG: 300 CAPSULE ORAL at 10:04

## 2023-04-06 RX ADMIN — MUPIROCIN 1 G: 20 OINTMENT TOPICAL at 06:04

## 2023-04-06 RX ADMIN — HYDROMORPHONE HYDROCHLORIDE 0.2 MG: 1 INJECTION, SOLUTION INTRAMUSCULAR; INTRAVENOUS; SUBCUTANEOUS at 12:04

## 2023-04-06 RX ADMIN — FENTANYL CITRATE 50 MCG: 50 INJECTION, SOLUTION INTRAMUSCULAR; INTRAVENOUS at 09:04

## 2023-04-06 RX ADMIN — HEPARIN SODIUM 5000 UNITS: 5000 INJECTION INTRAVENOUS; SUBCUTANEOUS at 06:04

## 2023-04-06 RX ADMIN — Medication 10 MG: at 07:04

## 2023-04-06 RX ADMIN — Medication 10 MG: at 09:04

## 2023-04-06 RX ADMIN — CEFTRIAXONE SODIUM 2 G: 1 INJECTION, POWDER, FOR SOLUTION INTRAMUSCULAR; INTRAVENOUS at 07:04

## 2023-04-06 RX ADMIN — FENTANYL CITRATE 25 MCG: 50 INJECTION, SOLUTION INTRAMUSCULAR; INTRAVENOUS at 01:04

## 2023-04-06 RX ADMIN — ROCURONIUM BROMIDE 10 MG: 10 INJECTION, SOLUTION INTRAVENOUS at 09:04

## 2023-04-06 RX ADMIN — ACETAMINOPHEN 1000 MG: 10 INJECTION INTRAVENOUS at 10:04

## 2023-04-06 RX ADMIN — DEXAMETHASONE SODIUM PHOSPHATE 4 MG: 4 INJECTION, SOLUTION INTRAMUSCULAR; INTRAVENOUS at 07:04

## 2023-04-06 RX ADMIN — ACETAMINOPHEN 1000 MG: 10 INJECTION INTRAVENOUS at 05:04

## 2023-04-06 RX ADMIN — HYDROMORPHONE HYDROCHLORIDE 0.2 MG: 1 INJECTION, SOLUTION INTRAMUSCULAR; INTRAVENOUS; SUBCUTANEOUS at 08:04

## 2023-04-06 RX ADMIN — ROCURONIUM BROMIDE 50 MG: 10 INJECTION, SOLUTION INTRAVENOUS at 07:04

## 2023-04-06 RX ADMIN — METOROPROLOL TARTRATE 2.5 MG: 5 INJECTION, SOLUTION INTRAVENOUS at 09:04

## 2023-04-06 RX ADMIN — INSULIN DETEMIR 25 UNITS: 100 INJECTION, SOLUTION SUBCUTANEOUS at 10:04

## 2023-04-06 RX ADMIN — OXYCODONE HYDROCHLORIDE 5 MG: 5 TABLET ORAL at 10:04

## 2023-04-06 RX ADMIN — PROPOFOL 40 MG: 10 INJECTION, EMULSION INTRAVENOUS at 08:04

## 2023-04-06 RX ADMIN — METRONIDAZOLE 500 MG: 500 INJECTION, SOLUTION INTRAVENOUS at 07:04

## 2023-04-06 RX ADMIN — DEXMEDETOMIDINE HYDROCHLORIDE 12 MCG: 100 INJECTION, SOLUTION INTRAVENOUS at 12:04

## 2023-04-06 RX ADMIN — ROCURONIUM BROMIDE 20 MG: 10 INJECTION, SOLUTION INTRAVENOUS at 09:04

## 2023-04-06 RX ADMIN — ROCURONIUM BROMIDE 10 MG: 10 INJECTION, SOLUTION INTRAVENOUS at 08:04

## 2023-04-06 RX ADMIN — INSULIN ASPART 8 UNITS: 100 INJECTION, SOLUTION INTRAVENOUS; SUBCUTANEOUS at 05:04

## 2023-04-06 RX ADMIN — ACETAMINOPHEN 976.6 MG: 650 SOLUTION ORAL at 06:04

## 2023-04-06 RX ADMIN — SODIUM CHLORIDE: 0.9 INJECTION, SOLUTION INTRAVENOUS at 07:04

## 2023-04-06 RX ADMIN — SUGAMMADEX 200 MG: 100 INJECTION, SOLUTION INTRAVENOUS at 01:04

## 2023-04-06 RX ADMIN — GABAPENTIN 300 MG: 300 CAPSULE ORAL at 06:04

## 2023-04-06 RX ADMIN — ALVIMOPAN 12 MG: 12 CAPSULE ORAL at 10:04

## 2023-04-06 RX ADMIN — MIDAZOLAM HYDROCHLORIDE 2 MG: 1 INJECTION INTRAMUSCULAR; INTRAVENOUS at 07:04

## 2023-04-06 RX ADMIN — Medication 10 MG: at 10:04

## 2023-04-06 RX ADMIN — PHENYLEPHRINE HYDROCHLORIDE 100 MCG: 10 INJECTION INTRAVENOUS at 12:04

## 2023-04-06 RX ADMIN — IBUPROFEN 800 MG: 800 INJECTION INTRAVENOUS at 05:04

## 2023-04-06 RX ADMIN — OXYCODONE HYDROCHLORIDE 5 MG: 5 TABLET ORAL at 03:04

## 2023-04-06 RX ADMIN — CETIRIZINE HYDROCHLORIDE 10 MG: 10 TABLET, FILM COATED ORAL at 03:04

## 2023-04-06 RX ADMIN — ROCURONIUM BROMIDE 20 MG: 10 INJECTION, SOLUTION INTRAVENOUS at 10:04

## 2023-04-06 RX ADMIN — EPHEDRINE SULFATE 20 MG: 50 INJECTION INTRAVENOUS at 09:04

## 2023-04-06 RX ADMIN — SODIUM CHLORIDE, SODIUM GLUCONATE, SODIUM ACETATE, POTASSIUM CHLORIDE, MAGNESIUM CHLORIDE, SODIUM PHOSPHATE, DIBASIC, AND POTASSIUM PHOSPHATE: .53; .5; .37; .037; .03; .012; .00082 INJECTION, SOLUTION INTRAVENOUS at 07:04

## 2023-04-06 NOTE — NURSING TRANSFER
Nursing Transfer Note      4/6/2023     Reason patient is being transferred: post op    Transfer To: 1007    Transfer via bed    Transfer with cardiac monitoring    Transported by transport    Medicines sent: Ibuprofen and insulin     Any special needs or follow-up needed: routine    Chart send with patient: Yes    Notified: spouse    Patient reassessed at: 0236

## 2023-04-06 NOTE — ANESTHESIA PROCEDURE NOTES
Intubation    Date/Time: 4/6/2023 7:24 AM  Performed by: Zaira Hernandez CRNA  Authorized by: MEGHAN Hong MD     Intubation:     Induction:  Intravenous    Intubated:  Postinduction    Mask Ventilation:  Moderately difficult with oral airway    Attempts:  1    Attempted By:  CRNA    Method of Intubation:  Video laryngoscopy    Blade:  Ordoñez 3    Laryngeal View Grade: Grade I - full view of cords      Difficult Airway Encountered?: No      Complications:  None    Airway Device:  Oral endotracheal tube    Airway Device Size:  7.5    Style/Cuff Inflation:  Cuffed (inflated to minimal occlusive pressure)    Tube secured:  22    Secured at:  The lips    Placement Verified By:  Capnometry (auscultation)    Complicating Factors:  None (small glottis)    Findings Post-Intubation:  BS equal bilateral and atraumatic/condition of teeth unchanged  Notes:      Head and neck neutral

## 2023-04-06 NOTE — OP NOTE
Date of procedure:   April 6, 2023    Indications for procedure:  68 yo male with mid to upper rectal cancer.  The patient's MRI revealed a T3 N0 tumor.  Decision was made to proceed with total neoadjuvant therapy.  The patient underwent short course radiotherapy followed by 8 cycles of FOLFOX with excellent clinical result and endoscopic finding was consistent with complete clinical response.  However, given the mid to upper rectal location index tumor location was not accessible by digital rectal examination.  We had a long discussion regarding the fact that this did not fit into the protocol for watch and wait therapy.  However, given subsequent MRI post treatment assessment revealing probable complete clinical response the patient elected to undergo an initial trial of watch and wait.  Unfortunately, during the course of surveillance the patient developed evidence of regrowth and it was recommended that he undergo definitive surgical resection, low anterior resection.  Details of procedure, need for temporary stoma diversion, expected recuperation and potential risks were discussed explicitly.    Preoperative diagnosis:   rectal cancer    Postoperative diagnosis:  same    Name of procedure:  laparoscopic low anterior resection, splenic flexure mobilization,     Surgeon:   MEGHAN Hong MD    Assistant surgeon:  Kami Hoffman MD    Type of anesthesia:  GETA    EBL:  200  Cc's    Drains:  none    Specimen:  portion of rectum and sigmoid colon    Findings:  1. Flexible sigmoidoscopy revealed evidence of a tumor at approximately 14-15 cm from the anal verge.    2. At exploratory laparoscopy there was no evidence of distant metastatic spread.  The tumor appeared to be at the top of the rectum, upper 3rd and was amenable to tumor specific mesorectal excision with a straight end-to-end colorectal anastomosis at 7-8 cm from the anal verge.  3. Flexible sigmoidoscopy revealed intact, hemostatic and airtight  anastomosis.      Technique in detail:  The patient was brought to the operating room positively identified and placed on the operating table in the supine position with sequential compression devices on his lower extremities.  The patient had undergone an outpatient oral mechanical and oral antibiotic bowel preparation.  He received intravenous antibiotics.  He was initiated on an enhanced recovery pathway, multimodal analgesia.  He received subcutaneous aqueous heparin.  He underwent general endotracheal anesthesia without complication.  Bowers catheter and orogastric tube were inserted.  He was positioned in the modified lithotomy position and padded Yellofin stirrups.  Both arms were wrapped with foam, pressure points padded and tucked at his side.    A critical time-out was performed.    Digital rectal examination revealed no palpable mucosal abnormalities.  No palpable tumor.  Flexible sigmoidoscopy was undertaken and revealed evidence of stenosis and mucosal abnormality consistent with regrowth of rectal cancer at 14-15 cm from the anal verge.  This could not be traversed with the colonoscope.  The prep was noted to be excellent.  Betadine was instilled into the rectum following flexible sigmoidoscopy.    The patient's abdomen was prepared and draped in usual sterile fashion.  A 12 mm balloon laparoscopic port was placed using open technique at the umbilicus.  Insufflation of the abdominal cavity was performed using carbon dioxide gas.  Pressures were monitored throughout procedure.  Video laparoscopy revealed no evidence of ascites, peritoneal surfaces were noted to be unremarkable and the liver was noted to be clean on visualization.  5 mm trocars were placed in the right upper quadrant, left side of the abdomen and 12 mm trocar was placed in the right lower quadrant lateral to the rectus sheath.  The patient was placed in Trendelenburg position and rotated to the right.  The omentum and transverse colon  were placed into the upper abdomen.  The small bowel was swept to the right side of the aorta.  Inferior mesenteric vein was dissected just to the left of the ligament of Treitz off the retroperitoneum in a medial to lateral fashion.  Dissection of the vein caudally towards the inferior mesenteric artery was undertaken.  The became apparent that there was a small serosal defect of the proximal jejunum secondary to grasping the bowel.  The decision was made to proceed to hand assisted technique given the difficulty we had retracting the small bowel because of the patient's body habitus.  A Pfannenstiel incision was made 1 cm above the symphysis pubis dizziness muscle-splitting technique.  GelPort was placed into this wound.  Pneumoperitoneum was restored.  A transversus abdominis plane block was performed.    Using hand assisted technique the inferior mesenteric artery was carefully dissected at its takeoff from the aorta clipped proximally and divided distally using the LigaSure device.  Care was taken to identify and preserve the left ureter.  The left colon and sigmoid colon were then mobilized along white line of Toldt in a lateral to medial fashion.  In anticipation of low anterior resection with splenic flexure mobilized.  The omentum was dissected off the transverse colon and the lesser sac was entered.  The left colon was mobilized completely off of the perinephric fascia back to level of the midline.  The transverse colon mesentery was carefully posteriorly to the level of the inferior border of the pancreas.  However, the splenic flexure was noted to be cranially positioned and with evidence of significant adhesions of the omentum densely to the bowel itself as well as to the mesentery.  The decision was made to place a 2nd hand port and a midline wound extending the umbilical incision cranially.  And assistant technique was employed completely mobilizing the splenic flexure by dissecting the omentum  completely off and by mobilizing the mesentery of the transverse colon back to the ligament of Treitz with division of the embryologic adhesions to the inferior border of the pancreas.  Inferior mesenteric vein was identified, skeletonized and divided using the LigaSure device.      Tumor specific mesorectal excision was performed with the distal resection margin of 5 cm.  The retroperitoneum on either side of the upper and mid rectum was scored and the mesorectum was mobilized posteriorly by dissecting the superior hemorrhoidal vessel into the pelvis close to the vessel to avoid injury to the hypogastric nerves.  Mesorectum was mobilized posteriorly into the distal 3rd just above the level of the pelvic floor.  Anteriorly the cul-de-sac between the rectum and the bladder was opened the anterior mesorectum was carefully mobilized with care being taken to preserve Denonvillier's fascia anteriorly.  Lastly the lateral aspect of the mesorectum was mobilized such that a distal resection of 5 cm could be achieved.  The mesorectum at this level was divided perpendicular to the longitudinal axis of the rectal musculature.  An attempt was made to laparoscopically divide the rectal muscular tube at this level by the stapler could not be placed over the bowel inadequate fashion.  The decision made to divide the bowel through the open Pfannenstiel incision.  This was performed in an anterior to posterior direction using 2 firings of the echelon 60 stapler with a green cartridge.  The specimen was then exteriorized through the midline GelPort wound.  Staple line on specimen was submitted.  This was noted to be intact suggesting that the staple line on the patient's side of the staple line was also intact.  The mesorectal fascia was noted to be preserved in tumor specific fashion.  The ascending branch of left colic artery and the inferior mesenteric vein were divided using the LigaSure.  The mesentery proximally was divided  just cephalad to the left colic artery as it coursed out to the descending colon sigmoid colon junction.  Marginal artery of Yared was carefully identified divided and allowed to bleed freely.  Pulsatile arterial blood flow was noted.  The bowel was transected in the distal descending colon using a Furness clamp and 2-0 Monosof suture on a Nabeel needle.  A 29 CDH stapler was brought onto the field and the anvil was placed into the bowel proximally in the pursestring suture securely tied down.  Excellent blood supply was noted to the conduit.  The specimen was handed off the operative field, photographed on the back table and ultimately opened measuring a distal resection margin of 5 cm.      Care was taken to properly orient the descending colon mesentery.  Bowel was brought into the pelvis for anastomosis.  Hemostasis was assured.  The assistant surgeon went to the perineum and dilated the anus to 2 fingerbreadths.  Intestinal sizers were advanced to the top of the residual rectum which was noted to be just above or cranial to the 1st rectal valve.  Ultimately, the CDH stapler was advanced to this level.  The spike was extruded adjacent to the staple line in the descending colon was brought into proximity and the instrument was mated and cinched down.  The traction was allowed to occur for 1 minute prior to firing the stapler.  Two complete anastomotic donuts were recovered and sent separately to Surgical pathology.  Flexible sigmoidoscopy revealed that the anastomosis was circumferential, hemostatic and airtight to air insufflation.  The anastomosis was at or just above distal rectal valve.       We exteriorized the small bowel through the wound in the midline and we identified a very superficial serosal tail in the proximal jejunum and this was repaired with 3-0 Vicryl suture.The small bowel was returned to the abdomen, placed anterior to the left colon and care was taken to ensure that the mesentery was flat.   A loop of ileum was chosen 20-30 cm proximal to the ileocecal valve to create diverting loop ileostomy.  An aperture was created at the premarked site in the right side of the abdomen overlying the rectus sheath.  The aperture was dilated to 2 fingerbreadths.  The loop of the ileum was covered with Seprafilm and brought up through the fascia with care being taken to properly orient the mesentery.    We changed gown and gloves and used a separate closing tray to close the abdomen.  12 mm fascial defect in the right lower quadrant was closed with 0 Vicryl suture.  The Pfannenstiel incision was closed 1st.  The posterior sheath and peritoneum were repaired vertically using continuous 0 Vicryl suture.  The anterior rectus fascia was repaired using continuous 1 PDS suture.  The midline fascial defect was repaired with continuous 1 PDS suture.  All skin incisions were irrigated with saline solution and repaired with subcuticular Monocryl suture and Dermabond.  The stoma was then matured as a loop ileostomy with an everted functional afferent limb and the distal efferent limb was matured flush to the skin using 3-0 Vicryl suture.  Stoma appliance was applied to the stoma.      The patient tolerated the procedure well and there were no complications noted.  The patient was returned to the supine position, awakened from anesthesia and extubated without incident.  He was returned to the recovery area in stable condition.      Rectal Cancer - Synoptic Operative Report Summary    ASA score ASA 2 - Patient with mild systemic disease with no functional limitations   Case status Elective   Operation Low anterior resection   Modality Hand-assisted laparoscopic   Location of tumor within rectum High   Height of lower edge of tumor from anal verge 14 cm   Mobilization of splenic flexure Yes   Level of ligation of GILLIAN GILLIAN   Level of ligation of IMV High   Distal margin 5 cm   Type of reconstruction Stapled end-to-end   Method(s) of  testing anastomosis Instillation of air under pelvic fluid, Observation of stapler rings, and Visualization of healthy mucosa   Stoma Ileostomy   En bloc resection None   Metastasectomy None   Completeness of resection R0 (>=1 mm from tumor to closest margin)   Intraoperative complications None   Blood transfusion No   TME photograph Operative report     [facility] Addendum    Required diversion prior to primary surgical intervention Yes   Associated with known or suspected hereditary disorder None   Primary or recurrent Primary malignancy   Ureteral catheters placed None   Flap reconstruction required Not required
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
## 2023-04-06 NOTE — BRIEF OP NOTE
Jerry Chapman - Surgery ([facility])  Brief Operative Note    SUMMARY     Surgery Date: 4/6/2023     Surgeon(s) and Role:     * MEGHAN Sam MD - Primary     * Kami Hoffman MD - Resident - Assisting        Pre-op Diagnosis:  Rectal cancer [C20]    Post-op Diagnosis:  Post-Op Diagnosis Codes:     * Rectal cancer [C20]    Procedure(s) (LRB):  COLECTOMY, LAPAROSCOPIC LOWER ANTERIOR, SPLENIC PLEXOR MOBILIZATION  (N/A)  SIGMOIDOSCOPY, FLEXIBLE (N/A)  CREATION, ILEOSTOMY (Right)    Anesthesia: General    Operative Findings: Laparoscopic Low anterior resection for mid-rectal cancer. Diverting loop ileostomy. Flexible sigmoidoscopy    Estimated Blood Loss: 250 mL    Estimated Blood Loss has been documented.         Specimens:   Specimen (24h ago, onward)       Start     Ordered    04/06/23 1307  Specimen to Pathology, Surgery General Surgery  Once        Comments: Pre-op Diagnosis: Rectal cancer [C20]Procedure(s):COLECTOMY, LAPAROSCOPIC LOWER ANTERIOR, SPLENIC PLEXOR MOBILIZATION SIGMOIDOSCOPY, FLEXIBLE Number of specimens: 3Name of specimens: 1. PORTION OF RECTUM AND SIGMOID COLON (PERMANENT)2. DISTAL ANASTOMOTIC DONUT RING (PERMANENT)3. PROXIMAL ANASTOMOTIC DONUT RING (PERMANENT)     References:    Click here for ordering Quick Tip   Question Answer Comment   Procedure Type: General Surgery    Which provider would you like to cc? MEGHAN SAM    Release to patient Immediate        04/06/23 1306                    [id number]

## 2023-04-06 NOTE — ANESTHESIA POSTPROCEDURE EVALUATION
Anesthesia Post Evaluation    Patient: KIRSTY Maosn    Procedure(s) Performed: Procedure(s) (LRB):  COLECTOMY, LAPAROSCOPIC LOWER ANTERIOR, SPLENIC PLEXOR MOBILIZATION  (N/A)  SIGMOIDOSCOPY, FLEXIBLE (N/A)  CREATION, ILEOSTOMY (Right)    Final Anesthesia Type: general      Level of consciousness: awake and alert  Post-procedure vital signs: reviewed and stable  Pain control: Pain has been treated.  Airway patency: patent    PONV status: Absent or treated.  Anesthetic complications: no      Cardiovascular status: hemodynamically stable  Respiratory status: unassisted  Hydration status: euvolemic            Vitals Value Taken Time   /66 04/06/23 1437     04/06/23 1443   Pulse 80 04/06/23 1443   Resp 19 04/06/23 1443   SpO2 100 % 04/06/23 1443   Vitals shown include unvalidated device data.      No case tracking events are documented in the log.      Pain/Beth Score: Pain Rating Prior to Med Admin: 2 (4/6/2023  6:27 AM)

## 2023-04-07 LAB
ANION GAP SERPL CALC-SCNC: 9 MMOL/L (ref 8–16)
BASOPHILS # BLD AUTO: 0.02 K/UL (ref 0–0.2)
BASOPHILS # BLD AUTO: 0.02 K/UL (ref 0–0.2)
BASOPHILS NFR BLD: 0.2 % (ref 0–1.9)
BASOPHILS NFR BLD: 0.2 % (ref 0–1.9)
BUN SERPL-MCNC: 18 MG/DL (ref 8–23)
CALCIUM SERPL-MCNC: 8.2 MG/DL (ref 8.7–10.5)
CHLORIDE SERPL-SCNC: 100 MMOL/L (ref 95–110)
CO2 SERPL-SCNC: 23 MMOL/L (ref 23–29)
CREAT SERPL-MCNC: 1 MG/DL (ref 0.5–1.4)
DIFFERENTIAL METHOD: ABNORMAL
DIFFERENTIAL METHOD: ABNORMAL
EOSINOPHIL # BLD AUTO: 0 K/UL (ref 0–0.5)
EOSINOPHIL # BLD AUTO: 0 K/UL (ref 0–0.5)
EOSINOPHIL NFR BLD: 0 % (ref 0–8)
EOSINOPHIL NFR BLD: 0 % (ref 0–8)
ERYTHROCYTE [DISTWIDTH] IN BLOOD BY AUTOMATED COUNT: 12.4 % (ref 11.5–14.5)
ERYTHROCYTE [DISTWIDTH] IN BLOOD BY AUTOMATED COUNT: 12.5 % (ref 11.5–14.5)
EST. GFR  (NO RACE VARIABLE): >60 ML/MIN/1.73 M^2
GLUCOSE SERPL-MCNC: 338 MG/DL (ref 70–110)
HCT VFR BLD AUTO: 33.4 % (ref 40–54)
HCT VFR BLD AUTO: 35.2 % (ref 40–54)
HGB BLD-MCNC: 11.6 G/DL (ref 14–18)
HGB BLD-MCNC: 12.2 G/DL (ref 14–18)
IMM GRANULOCYTES # BLD AUTO: 0.03 K/UL (ref 0–0.04)
IMM GRANULOCYTES # BLD AUTO: 0.04 K/UL (ref 0–0.04)
IMM GRANULOCYTES NFR BLD AUTO: 0.3 % (ref 0–0.5)
IMM GRANULOCYTES NFR BLD AUTO: 0.4 % (ref 0–0.5)
LYMPHOCYTES # BLD AUTO: 0.8 K/UL (ref 1–4.8)
LYMPHOCYTES # BLD AUTO: 1.2 K/UL (ref 1–4.8)
LYMPHOCYTES NFR BLD: 10.7 % (ref 18–48)
LYMPHOCYTES NFR BLD: 7.2 % (ref 18–48)
MAGNESIUM SERPL-MCNC: 1.7 MG/DL (ref 1.6–2.6)
MCH RBC QN AUTO: 32.1 PG (ref 27–31)
MCH RBC QN AUTO: 32.1 PG (ref 27–31)
MCHC RBC AUTO-ENTMCNC: 34.7 G/DL (ref 32–36)
MCHC RBC AUTO-ENTMCNC: 34.7 G/DL (ref 32–36)
MCV RBC AUTO: 93 FL (ref 82–98)
MCV RBC AUTO: 93 FL (ref 82–98)
MONOCYTES # BLD AUTO: 0.9 K/UL (ref 0.3–1)
MONOCYTES # BLD AUTO: 0.9 K/UL (ref 0.3–1)
MONOCYTES NFR BLD: 7.9 % (ref 4–15)
MONOCYTES NFR BLD: 8.7 % (ref 4–15)
NEUTROPHILS # BLD AUTO: 8.6 K/UL (ref 1.8–7.7)
NEUTROPHILS # BLD AUTO: 9.4 K/UL (ref 1.8–7.7)
NEUTROPHILS NFR BLD: 80.1 % (ref 38–73)
NEUTROPHILS NFR BLD: 84.3 % (ref 38–73)
NRBC BLD-RTO: 0 /100 WBC
NRBC BLD-RTO: 0 /100 WBC
PHOSPHATE SERPL-MCNC: 3.4 MG/DL (ref 2.7–4.5)
PLATELET # BLD AUTO: 147 K/UL (ref 150–450)
PLATELET # BLD AUTO: 168 K/UL (ref 150–450)
PMV BLD AUTO: 10.5 FL (ref 9.2–12.9)
PMV BLD AUTO: 10.6 FL (ref 9.2–12.9)
POCT GLUCOSE: 249 MG/DL (ref 70–110)
POCT GLUCOSE: 251 MG/DL (ref 70–110)
POCT GLUCOSE: 260 MG/DL (ref 70–110)
POCT GLUCOSE: 303 MG/DL (ref 70–110)
POTASSIUM SERPL-SCNC: 4 MMOL/L (ref 3.5–5.1)
RBC # BLD AUTO: 3.61 M/UL (ref 4.6–6.2)
RBC # BLD AUTO: 3.8 M/UL (ref 4.6–6.2)
SODIUM SERPL-SCNC: 132 MMOL/L (ref 136–145)
WBC # BLD AUTO: 10.71 K/UL (ref 3.9–12.7)
WBC # BLD AUTO: 11.08 K/UL (ref 3.9–12.7)

## 2023-04-07 PROCEDURE — 97161 PT EVAL LOW COMPLEX 20 MIN: CPT

## 2023-04-07 PROCEDURE — 36415 COLL VENOUS BLD VENIPUNCTURE: CPT | Performed by: STUDENT IN AN ORGANIZED HEALTH CARE EDUCATION/TRAINING PROGRAM

## 2023-04-07 PROCEDURE — 97535 SELF CARE MNGMENT TRAINING: CPT

## 2023-04-07 PROCEDURE — 94761 N-INVAS EAR/PLS OXIMETRY MLT: CPT

## 2023-04-07 PROCEDURE — 20600001 HC STEP DOWN PRIVATE ROOM

## 2023-04-07 PROCEDURE — 94799 UNLISTED PULMONARY SVC/PX: CPT

## 2023-04-07 PROCEDURE — 80048 BASIC METABOLIC PNL TOTAL CA: CPT | Performed by: STUDENT IN AN ORGANIZED HEALTH CARE EDUCATION/TRAINING PROGRAM

## 2023-04-07 PROCEDURE — 99222 PR INITIAL HOSPITAL CARE,LEVL II: ICD-10-PCS | Mod: ,,, | Performed by: PHYSICIAN ASSISTANT

## 2023-04-07 PROCEDURE — 25000003 PHARM REV CODE 250: Performed by: NURSE PRACTITIONER

## 2023-04-07 PROCEDURE — 83735 ASSAY OF MAGNESIUM: CPT | Performed by: STUDENT IN AN ORGANIZED HEALTH CARE EDUCATION/TRAINING PROGRAM

## 2023-04-07 PROCEDURE — 25000003 PHARM REV CODE 250: Performed by: STUDENT IN AN ORGANIZED HEALTH CARE EDUCATION/TRAINING PROGRAM

## 2023-04-07 PROCEDURE — 97530 THERAPEUTIC ACTIVITIES: CPT

## 2023-04-07 PROCEDURE — 99222 1ST HOSP IP/OBS MODERATE 55: CPT | Mod: ,,, | Performed by: PHYSICIAN ASSISTANT

## 2023-04-07 PROCEDURE — 25000003 PHARM REV CODE 250: Performed by: PHYSICIAN ASSISTANT

## 2023-04-07 PROCEDURE — 25000003 PHARM REV CODE 250

## 2023-04-07 PROCEDURE — 85025 COMPLETE CBC W/AUTO DIFF WBC: CPT | Performed by: STUDENT IN AN ORGANIZED HEALTH CARE EDUCATION/TRAINING PROGRAM

## 2023-04-07 PROCEDURE — 63600175 PHARM REV CODE 636 W HCPCS: Performed by: PHYSICIAN ASSISTANT

## 2023-04-07 PROCEDURE — 84100 ASSAY OF PHOSPHORUS: CPT | Performed by: STUDENT IN AN ORGANIZED HEALTH CARE EDUCATION/TRAINING PROGRAM

## 2023-04-07 PROCEDURE — 63600175 PHARM REV CODE 636 W HCPCS: Performed by: STUDENT IN AN ORGANIZED HEALTH CARE EDUCATION/TRAINING PROGRAM

## 2023-04-07 PROCEDURE — 97165 OT EVAL LOW COMPLEX 30 MIN: CPT

## 2023-04-07 RX ORDER — LANOLIN ALCOHOL/MO/W.PET/CERES
800 CREAM (GRAM) TOPICAL EVERY 4 HOURS
Status: COMPLETED | OUTPATIENT
Start: 2023-04-07 | End: 2023-04-07

## 2023-04-07 RX ORDER — INSULIN ASPART 100 [IU]/ML
7-14 INJECTION, SOLUTION INTRAVENOUS; SUBCUTANEOUS
Status: DISCONTINUED | OUTPATIENT
Start: 2023-04-07 | End: 2023-04-08

## 2023-04-07 RX ORDER — INSULIN ASPART 100 [IU]/ML
1-10 INJECTION, SOLUTION INTRAVENOUS; SUBCUTANEOUS
Status: DISCONTINUED | OUTPATIENT
Start: 2023-04-07 | End: 2023-04-10

## 2023-04-07 RX ORDER — DEXTROSE 40 %
15 GEL (GRAM) ORAL
Status: DISCONTINUED | OUTPATIENT
Start: 2023-04-07 | End: 2023-04-11 | Stop reason: HOSPADM

## 2023-04-07 RX ORDER — INSULIN ASPART 100 [IU]/ML
0-15 INJECTION, SOLUTION INTRAVENOUS; SUBCUTANEOUS
Status: DISCONTINUED | OUTPATIENT
Start: 2023-04-07 | End: 2023-04-07

## 2023-04-07 RX ORDER — DEXTROSE 40 %
30 GEL (GRAM) ORAL
Status: DISCONTINUED | OUTPATIENT
Start: 2023-04-07 | End: 2023-04-11 | Stop reason: HOSPADM

## 2023-04-07 RX ORDER — GLUCAGON 1 MG
1 KIT INJECTION
Status: DISCONTINUED | OUTPATIENT
Start: 2023-04-07 | End: 2023-04-10

## 2023-04-07 RX ORDER — IBUPROFEN 200 MG
16 TABLET ORAL
Status: DISCONTINUED | OUTPATIENT
Start: 2023-04-07 | End: 2023-04-07

## 2023-04-07 RX ORDER — IBUPROFEN 200 MG
24 TABLET ORAL
Status: DISCONTINUED | OUTPATIENT
Start: 2023-04-07 | End: 2023-04-07

## 2023-04-07 RX ADMIN — MUPIROCIN: 20 OINTMENT TOPICAL at 09:04

## 2023-04-07 RX ADMIN — IBUPROFEN 800 MG: 400 TABLET ORAL at 09:04

## 2023-04-07 RX ADMIN — GABAPENTIN 300 MG: 300 CAPSULE ORAL at 09:04

## 2023-04-07 RX ADMIN — ALVIMOPAN 12 MG: 12 CAPSULE ORAL at 08:04

## 2023-04-07 RX ADMIN — INSULIN ASPART 14 UNITS: 100 INJECTION, SOLUTION INTRAVENOUS; SUBCUTANEOUS at 05:04

## 2023-04-07 RX ADMIN — INSULIN ASPART 8 UNITS: 100 INJECTION, SOLUTION INTRAVENOUS; SUBCUTANEOUS at 08:04

## 2023-04-07 RX ADMIN — ACETAMINOPHEN 1000 MG: 500 TABLET ORAL at 09:04

## 2023-04-07 RX ADMIN — IBUPROFEN 800 MG: 800 INJECTION INTRAVENOUS at 12:04

## 2023-04-07 RX ADMIN — IBUPROFEN 800 MG: 800 INJECTION INTRAVENOUS at 05:04

## 2023-04-07 RX ADMIN — Medication 800 MG: at 01:04

## 2023-04-07 RX ADMIN — INSULIN ASPART 2 UNITS: 100 INJECTION, SOLUTION INTRAVENOUS; SUBCUTANEOUS at 09:04

## 2023-04-07 RX ADMIN — INSULIN DETEMIR 38 UNITS: 100 INJECTION, SOLUTION SUBCUTANEOUS at 09:04

## 2023-04-07 RX ADMIN — INSULIN ASPART 6 UNITS: 100 INJECTION, SOLUTION INTRAVENOUS; SUBCUTANEOUS at 12:04

## 2023-04-07 RX ADMIN — ACETAMINOPHEN 1000 MG: 500 TABLET ORAL at 02:04

## 2023-04-07 RX ADMIN — GABAPENTIN 300 MG: 300 CAPSULE ORAL at 08:04

## 2023-04-07 RX ADMIN — ALVIMOPAN 12 MG: 12 CAPSULE ORAL at 09:04

## 2023-04-07 RX ADMIN — MUPIROCIN: 20 OINTMENT TOPICAL at 08:04

## 2023-04-07 RX ADMIN — CETIRIZINE HYDROCHLORIDE 10 MG: 10 TABLET, FILM COATED ORAL at 08:04

## 2023-04-07 RX ADMIN — IBUPROFEN 800 MG: 400 TABLET ORAL at 01:04

## 2023-04-07 RX ADMIN — Medication 800 MG: at 10:04

## 2023-04-07 RX ADMIN — GABAPENTIN 300 MG: 300 CAPSULE ORAL at 03:04

## 2023-04-07 RX ADMIN — SODIUM CHLORIDE: 9 INJECTION, SOLUTION INTRAVENOUS at 01:04

## 2023-04-07 RX ADMIN — INSULIN ASPART 6 UNITS: 100 INJECTION, SOLUTION INTRAVENOUS; SUBCUTANEOUS at 05:04

## 2023-04-07 NOTE — NURSING
Notified on colorectal on call MD BRANHAM pt blood sugar has been in the upper 300's since yesterday. See new orders given. will continue to monitor.
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
## 2023-04-07 NOTE — ASSESSMENT & PLAN NOTE
BG goal: 140-180   T2DM.  S/p LAR with diverting loop colostomy on 4/6.   BG above goal here.  Will WBD at 0.7 which is ~  20 % reduction in home regimen.  Switch to Diabetic diet as well.    - -Increase Levemir  to 38 units daily   - Add Novolog mealtime insulin based on intake 7-14 units with meals. Administer 7 units if patient eats 25% -  50% and 14 units if patient eats 50% or more.  - Continue Mod dose correction ISF 25 starting at 150.  - POCT Glucose before meals, at bedtime and at 2 am  - Hypoglycemia protocol in place      ** Please notify Endocrine for any change and/or advance in diet**  ** Please call Endocrine for any BG related issues **     Discharge Planning:   TBD. Please notify endocrinology prior to discharge.

## 2023-04-07 NOTE — PLAN OF CARE
Problem: Physical Therapy  Goal: Physical Therapy Goal  Description: Goals to be met by: 2023     Patient will increase functional independence with mobility by performin. Supine to sit with Set-up [place]  2. Sit to supine with Set-up [place]  3. Sit to stand transfer with Supervision  4. Bed to chair transfer with Supervision using No Assistive Device  5. Gait  x 300 feet with Supervision using No Assistive Device.   6. Lower extremity exercise program x15 reps per handout, with supervision    Outcome: Ongoing, Progressing

## 2023-04-07 NOTE — PLAN OF CARE
Problem: Occupational Therapy  Goal: Occupational Therapy Goal  Description: Goals to be met by: 4/28/2023    Patient will increase functional independence with ADLs by performing:    UE Dressing with Set-up Assistance.  LE Dressing with Supervision.  Grooming while standing at sink with Modified [place].  Toileting from toilet with Modified [place] for hygiene and clothing management.   Supine to sit with Modified [place].  Stand pivot transfers with Modified [place].  Toilet transfer to toilet with Modified [place].    Outcome: Ongoing, Progressing   Patient's goals are set.
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
## 2023-04-07 NOTE — CONSULTS
Jerry Chapman - [facility]  Endocrinology  Diabetes Consult Note    Consult Requested by: MEGHAN Hong MD   Reason for admit: Rectal cancer    HISTORY OF PRESENT ILLNESS:  Reason for Consult: Management of T2DM, Hyperglycemia     Surgical Procedure and Date: LAR with diverting loop colostomy on .    Diabetes diagnosis year: >10 years ago    Home Diabetes Medications:  Levemir 25 units BID, Novolog 22 units with meals    How often checking glucose at home? One   BG readings on regimen: 100s-200s  Hypoglycemia on the regimen?  No  Missed doses on regimen?  No    Diabetes Complications include:     Hyperglycemia and Diabetic peripheral neuropathy     Complicating diabetes co morbidities:   Active Cancer and Glucocorticoid use       HPI:   Patient is a 67 y.o. 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   Pt s/p LAR with diverting loop colostomy on .  Endocrine consulted for BG management          Interval HPI:   No acute events overnight. Patient in room 1007/1007 A. Blood glucose worsening. BG above goal on current insulin regimen (SSI, prandial, and basal insulin ). Steroid use- Dexamethasone  4 mg .   1 Day Post-Op  Renal function- Normal   Vasopressors-  None     Diet Adult Regular (IDDSI Level 7)     Eatin%  Nausea: No  Hypoglycemia and intervention: No  Fever: No  TPN and/or TF: No      PMH, PSH, FH, SH updated and reviewed     ROS:    Review of Systems   Constitutional:  Negative for unexpected weight change.   Eyes:  Negative for visual disturbance.   Respiratory:  Negative for cough.    Cardiovascular:  Positive for leg swelling. Negative for chest pain.   Gastrointestinal:  Negative for nausea and vomiting.   Endocrine: Negative for polydipsia and polyuria.   Musculoskeletal:  Negative for back pain.   Skin:  Negative for rash.   Neurological:  Negative for syncope.   Psychiatric/Behavioral:  Negative for agitation and dysphoric mood.      Current  Medications and/or Treatments Impacting Glycemic Control  Immunotherapy:    Immunosuppressants       None          Steroids:   Hormones (From admission, onward)      None          Pressors:    Autonomic Drugs (From admission, onward)      None          Hyperglycemia/Diabetes Medications:   Antihyperglycemics (From admission, onward)      Start     Stop Route Frequency Ordered    04/07/23 0946  insulin aspart U-100 pen 0-15 Units         -- SubQ Before meals & nightly PRN 04/07/23 0847    04/06/23 2100  insulin detemir U-100 (Levemir) pen 25 Units         -- SubQ Nightly 04/06/23 1412             PHYSICAL EXAMINATION:  Vitals:    04/07/23 0744   BP: (!) 109/55   Pulse: 80   Resp: 20   Temp: 97.5 °F (36.4 °C)     Body mass index is 32.31 kg/m².    Physical Exam  Constitutional:       General: He is not in acute distress.     Appearance: Normal appearance. He is not ill-appearing.   HENT:      Head: Normocephalic and atraumatic.      Right Ear: External ear normal.      Left Ear: External ear normal.      Nose: Nose normal.   Cardiovascular:      Rate and Rhythm: Normal rate and regular rhythm.      Heart sounds: No murmur heard.  Pulmonary:      Effort: Pulmonary effort is normal. No respiratory distress.   Abdominal:      General: Bowel sounds are normal. There is no distension.      Tenderness: There is no abdominal tenderness.   Musculoskeletal:         General: No swelling.      Right lower leg: Edema present.      Left lower leg: Edema present.   Skin:     Findings: No erythema.   Neurological:      General: No focal deficit present.      Mental Status: He is alert and oriented to person, place, and time.   Psychiatric:         Mood and Affect: Mood normal.         Behavior: Behavior normal.         Labs Reviewed and Include   Recent Labs   Lab 04/07/23  0331   *   CALCIUM 8.2*   *   K 4.0   CO2 23      BUN 18   CREATININE 1.0     Lab Results   Component Value Date    WBC 10.71 04/07/2023     HGB 11.6 (L) 04/07/2023    HCT 33.4 (L) 04/07/2023    MCV 93 04/07/2023     (L) 04/07/2023     No results for input(s): TSH, FREET4 in the last 168 hours.  Lab Results   Component Value Date    HGBA1C 9.3 (H) 03/28/2023       Nutritional status:   Body mass index is 32.31 kg/m².  Lab Results   Component Value Date    ALBUMIN 3.7 03/28/2023    ALBUMIN 3.6 09/06/2022    ALBUMIN 3.6 08/29/2022     No results found for: PREALBUMIN    Estimated Creatinine Clearance: 91.1 mL/min (based on SCr of 1 mg/dL).    Accu-Checks  Recent Labs     04/06/23  0600 04/06/23  1709 04/06/23  2252 04/07/23  0747 04/07/23  1114   POCTGLUCOSE 231* 343* 329* 303* 260*        ASSESSMENT and PLAN    Oncology  * Rectal cancer  Managed by primary team  S/p sx.      Endocrine  Uncontrolled type 2 diabetes mellitus with hyperglycemia  BG goal: 140-180   T2DM.  S/p LAR with diverting loop colostomy on 4/6.   BG above goal here.  Will WBD at 0.7 which is ~  20 % reduction in home regimen.  Switch to Diabetic diet as well.    - -Increase Levemir  to 38 units daily   - Add Novolog mealtime insulin based on intake 7-14 units with meals. Administer 7 units if patient eats 25% -  50% and 14 units if patient eats 50% or more.  - Continue Mod dose correction ISF 25 starting at 150.  - POCT Glucose before meals, at bedtime and at 2 am  - Hypoglycemia protocol in place      ** Please notify Endocrine for any change and/or advance in diet**  ** Please call Endocrine for any BG related issues **     Discharge Planning:   TBD. Please notify endocrinology prior to discharge.              Plan discussed with patient, family, and RN at bedside.     Marlon Dave PA-C  Endocrinology  [facility]

## 2023-04-07 NOTE — SUBJECTIVE & OBJECTIVE
Interval HPI:   No acute events overnight. Patient in room 1007/1007 A. Blood glucose worsening. BG above goal on current insulin regimen (SSI, prandial, and basal insulin ). Steroid use- Dexamethasone  4 mg .   1 Day Post-Op  Renal function- Normal   Vasopressors-  None     Diet Adult Regular (IDDSI Level 7)     Eatin%  Nausea: No  Hypoglycemia and intervention: No  Fever: No  TPN and/or TF: No      PMH, PSH, FH, SH updated and reviewed     ROS:    Review of Systems   Constitutional:  Negative for unexpected weight change.   Eyes:  Negative for visual disturbance.   Respiratory:  Negative for cough.    Cardiovascular:  Positive for leg swelling. Negative for chest pain.   Gastrointestinal:  Negative for nausea and vomiting.   Endocrine: Negative for polydipsia and polyuria.   Musculoskeletal:  Negative for back pain.   Skin:  Negative for rash.   Neurological:  Negative for syncope.   Psychiatric/Behavioral:  Negative for agitation and dysphoric mood.      Current Medications and/or Treatments Impacting Glycemic Control  Immunotherapy:    Immunosuppressants       None          Steroids:   Hormones (From admission, onward)      None          Pressors:    Autonomic Drugs (From admission, onward)      None          Hyperglycemia/Diabetes Medications:   Antihyperglycemics (From admission, onward)      Start     Stop Route Frequency Ordered    23 0946  insulin aspart U-100 pen 0-15 Units         -- SubQ Before meals & nightly PRN 23 0847    23 2100  insulin detemir U-100 (Levemir) pen 25 Units         -- SubQ Nightly 23 1412             PHYSICAL EXAMINATION:  Vitals:    23 0744   BP: (!) 109/55   Pulse: 80   Resp: 20   Temp: 97.5 °F (36.4 °C)     Body mass index is 32.31 kg/m².    Physical Exam  Constitutional:       General: He is not in acute distress.     Appearance: Normal appearance. He is not ill-appearing.   HENT:      Head: Normocephalic and atraumatic.      Right Ear:  External ear normal.      Left Ear: External ear normal.      Nose: Nose normal.   Cardiovascular:      Rate and Rhythm: Normal rate and regular rhythm.      Heart sounds: No murmur heard.  Pulmonary:      Effort: Pulmonary effort is normal. No respiratory distress.   Abdominal:      General: Bowel sounds are normal. There is no distension.      Tenderness: There is no abdominal tenderness.   Musculoskeletal:         General: No swelling.      Right lower leg: Edema present.      Left lower leg: Edema present.   Skin:     Findings: No erythema.   Neurological:      General: No focal deficit present.      Mental Status: He is alert and oriented to person, place, and time.   Psychiatric:         Mood and Affect: Mood normal.         Behavior: Behavior normal.

## 2023-04-07 NOTE — PLAN OF CARE
Pt AAOx4 without any current distress, pt ileostomy output very minimal but passing gas. Afebrile no pain, velasquez intact and draining clear yellow urine, ambulated today with PT, tolerates regular diet well, lap sites and surgery incision open to air  Problem: Adult Inpatient Plan of Care  Goal: Plan of Care Review  Outcome: Ongoing, Progressing  Flowsheets (Taken 4/7/2023 1642)  Plan of Care Reviewed With: patient  Goal: Patient-Specific Goal (Individualized)  Outcome: Ongoing, Progressing  Goal: Absence of Hospital-Acquired Illness or Injury  Outcome: Ongoing, Progressing  Intervention: Identify and Manage Fall Risk  Flowsheets (Taken 4/7/2023 1642)  Safety Promotion/Fall Prevention: assistive device/personal item within reach  Intervention: Prevent Skin Injury  Flowsheets (Taken 4/7/2023 1642)  Body Position: weight shifting  Skin Protection:   adhesive use limited   skin-to-skin areas padded   skin-to-device areas padded   tubing/devices free from skin contact   transparent dressing maintained   incontinence pads utilized  Intervention: Prevent and Manage VTE (Venous Thromboembolism) Risk  Flowsheets (Taken 4/7/2023 1642)  Activity Management:   Leg kicks - L2   Ambulated in room - L4   Ambulated in henao - L4   Arm raise - L1   Ambulated -L4  Intervention: Prevent Infection  Flowsheets (Taken 4/7/2023 1642)  Infection Prevention: hand hygiene promoted  Goal: Optimal Comfort and Wellbeing  Outcome: Ongoing, Progressing  Intervention: Monitor Pain and Promote Comfort  Flowsheets (Taken 4/7/2023 1642)  Pain Management Interventions: relaxation techniques promoted  Intervention: Provide Person-Centered Care  Flowsheets (Taken 4/7/2023 1642)  Trust Relationship/Rapport: care explained  Goal: Readiness for Transition of Care  Outcome: Ongoing, Progressing     Problem: Diabetes Comorbidity  Goal: Blood Glucose Level Within Targeted Range  Outcome: Ongoing, Progressing  Intervention: Monitor and Manage  Glycemia  Flowsheets (Taken 4/7/2023 1642)  Glycemic Management:   blood glucose monitored   carbohydrate replacement provided     Problem: Infection  Goal: Absence of Infection Signs and Symptoms  Outcome: Ongoing, Progressing  Intervention: Prevent or Manage Infection  Flowsheets (Taken 4/7/2023 1642)  Fever Reduction/Comfort Measures:   lightweight clothing   lightweight bedding  Isolation Precautions: precautions maintained     Problem: Fall Injury Risk  Goal: Absence of Fall and Fall-Related Injury  Outcome: Ongoing, Progressing  Intervention: Identify and Manage Contributors  Flowsheets (Taken 4/7/2023 1642)  Self-Care Promotion:   BADL personal objects within reach   BADL personal routines maintained  Medication Review/Management: medications reviewed

## 2023-04-07 NOTE — ASSESSMENT & PLAN NOTE
KIRSTY Mason is an 67 y.o. male that is s/p LAR with diverting loop colostomy on 4/6. Doing well post-op.    - Pain - tylenol, ibuprofen, gabapentin, oxy prn  - Diet - regular diet  --dc IVF   - Nausea control w/ PRN antiemetics   - OOB, ambulate x3  - Encourage IS  - DVT ppx   - Continue velasquez until 4/8    - Dispo: JANETH

## 2023-04-07 NOTE — HPI
Reason for Consult: Management of T2DM, Hyperglycemia     Surgical Procedure and Date: LAR with diverting loop colostomy on 4/6.    Diabetes diagnosis year: >10 years ago    Home Diabetes Medications:  Levemir 25 units BID, Novolog 22 units with meals    How often checking glucose at home? One   BG readings on regimen: 100s-200s  Hypoglycemia on the regimen?  No  Missed doses on regimen?  No    Diabetes Complications include:     Hyperglycemia and Diabetic peripheral neuropathy     Complicating diabetes co morbidities:   Active Cancer and Glucocorticoid use       HPI:   Patient is a 67 y.o. 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   Pt s/p LAR with diverting loop colostomy on 4/6.  Endocrine consulted for BG management

## 2023-04-07 NOTE — PLAN OF CARE
Recommendations     1.) Recommend continuing with 2,000Kcal Diabetic Diet w/ Consistent Carbs, as tolerated.      2.) If PO intake is poor <50%, please provide Boost Glucose Control TID to provide an additional 750Kcal and 42gPRO.      3.) RD to monitor labs, wt PO intake, skin.        Goals: to meet % of EEN/EPN by next RD f/u  Nutrition Goal Status: new  Communication of RD Recs:  (POC)

## 2023-04-07 NOTE — SUBJECTIVE & OBJECTIVE
Subjective:     Interval History: NAOE. Complains of abd pain on inspiration. Denies nausea and belching. Complain of LE edema which is at his baseline. Gas in colostomy bag. Has not ambulated.     Post-Op Info:  Procedure(s) (LRB):  COLECTOMY, LAPAROSCOPIC LOWER ANTERIOR, SPLENIC PLEXOR MOBILIZATION  (N/A)  SIGMOIDOSCOPY, FLEXIBLE (N/A)  CREATION, ILEOSTOMY (Right)   1 Day Post-Op      Medications:  Continuous Infusions:  Scheduled Meds:   acetaminophen  1,000 mg Intravenous Q8H    Followed by    acetaminophen  1,000 mg Oral Q8H    alvimopan  12 mg Oral BID    cetirizine  10 mg Oral Daily    gabapentin  300 mg Oral TID    ibuprofen  800 mg Oral Q8H    insulin detemir U-100  25 Units Subcutaneous QHS    mupirocin   Nasal BID     PRN Meds:   dextrose 10%    dextrose 10%    dextrose    dextrose    dextrose 50%    dextrose 50%    glucagon (human recombinant)    glucose    glucose    insulin aspart U-100    insulin aspart U-100    ondansetron    oxyCODONE    oxyCODONE    prochlorperazine    sodium chloride 0.9%    traMADoL        Objective:     Vital Signs (Most Recent):  Temp: 97.5 °F (36.4 °C) (04/07/23 0744)  Pulse: 80 (04/07/23 0744)  Resp: 20 (04/07/23 0744)  BP: (!) 109/55 (04/07/23 0744)  SpO2: (!) 93 % (04/07/23 0744)   Vital Signs (24h Range):  Temp:  [97.5 °F (36.4 °C)-98.3 °F (36.8 °C)] 97.5 °F (36.4 °C)  Pulse:  [64-86] 80  Resp:  [8-25] 20  SpO2:  [92 %-100 %] 93 %  BP: (109-139)/(55-77) 109/55     Intake/Output - Last 3 Shifts         04/05 0700 04/06 0659 04/06 0700 04/07 0659 04/07 0700 04/08 0659    I.V. (mL/kg)  1000 (9)     IV Piggyback  1600     Total Intake(mL/kg)  2600 (23.4)     Urine (mL/kg/hr)  1040 (0.4)     Stool  50     Blood  250     Total Output  1340     Net  +1260                    Physical Exam  Constitutional:       General: He is not in acute distress.     Appearance: Normal appearance. He is not ill-appearing.   HENT:      Head: Atraumatic.   Eyes:      Extraocular Movements:  Extraocular movements intact.   Cardiovascular:      Rate and Rhythm: Normal rate.   Pulmonary:      Effort: Pulmonary effort is normal.   Abdominal:      General: Abdomen is flat.      Palpations: Abdomen is soft.      Comments: Gas in ostomy bag  Incisions CDI   Musculoskeletal:         General: Normal range of motion.   Skin:     General: Skin is warm and dry.   Neurological:      General: No focal deficit present.   Psychiatric:         Mood and Affect: Mood normal.         Behavior: Behavior normal.       Significant Labs:  CBC:   Recent Labs   Lab 04/07/23  0331   WBC 10.71   RBC 3.61*   HGB 11.6*   HCT 33.4*   *   MCV 93   MCH 32.1*   MCHC 34.7     CMP (Last 3 Results):   Recent Labs   Lab 04/06/23  2259 04/07/23  0331   * 338*   CALCIUM 8.2* 8.2*   * 132*   K 4.2 4.0   CO2 23 23    100   BUN 18 18   CREATININE 1.0 1.0       Significant Diagnostics:  I have reviewed all pertinent imaging results/findings within the past 24 hours.

## 2023-04-07 NOTE — CONSULTS
"  [facility]  Adult Nutrition  Consult Note    SUMMARY     Recommendations    1.) Recommend continuing with 2,000Kcal Diabetic Diet w/ Consistent Carbs, as tolerated.     2.) If PO intake is poor <50%, please provide Boost Glucose Control TID to provide an additional 750Kcal and 42gPRO.     3.) RD to monitor labs, wt PO intake, skin.      Goals: to meet % of EEN/EPN by next RD f/u  Nutrition Goal Status: new  Communication of RD Recs:  (POC)    Assessment and Plan    Nutrition Problem  Excessive carbohydrate consumption     Related to (etiology):   Diabetes Dx    Signs and Symptoms (as evidenced by):   A1C 9.1     Interventions/Recommendations (treatment strategy):  Collaboration of nutritional care with other providers.  Diabetes Education (Carb Counting) and how to build your plate.     Nutrition Diagnosis Status:   New     Reason for Assessment    Reason For Assessment: consult  Diagnosis: cancer diagnosis/related complications (Rectal CA; s/p LAR with diverting loop colostomy on 4/6)  Relevant Medical History: GERD, HLD, CAD, DM2, edema d/t venous status  Interdisciplinary Rounds: did not attend  General Information Comments: RD consulted: Pt denies n/v/d/c. Pt endorses fair to good appetite, consuming % of their breakfast. Pt stated to RD that they had some intentional wt loss and that their highest wt was 250# and lowest wt was 234#. Diabetic Diet education was provided to pt and caregiver. All question were answered and RD's contact information was left with the handouts. Mild edema present, pt is at risk for wt flux.  Nutrition Discharge Planning: as per medical course    Nutrition Risk Screen    Nutrition Risk Screen: no indicators present    Nutrition/Diet History    Spiritual, Cultural Beliefs, Nondenominational Practices, Values that Affect Care: no    Anthropometrics    Temp: 97.6 °F (36.4 °C)  Height Method: Stated  Height: 6' 1" (185.4 cm)  Height (inches): 73 in  Weight Method: Bed " Scale  Weight: 111.1 kg (244 lb 14.9 oz)  Weight (lb): 244.93 lb  Ideal Body Weight (IBW), Male: 184 lb  % Ideal Body Weight, Male (lb): 127.17 %  BMI (Calculated): 32.3  BMI Grade: 30 - 34.9- obesity - grade I     Lab/Procedures/Meds    Pertinent Labs Reviewed: reviewed  Pertinent Labs Comments: Na: 132, gluc: 338, Ca: 8.2, A1C: 9.3 (3/28)  Pertinent Medications Reviewed: reviewed  Pertinent Medications Comments: insulin    Estimated/Assessed Needs    Weight Used For Calorie Calculations: 111.1 kg (244 lb 14.9 oz)  Energy Calorie Requirements (kcal): 1930kcal  Energy Need Method: Kings-St Jeor (PAL 1.0- d/t obesity)  Protein Requirements: 111- 134g (1.0-1.2g/kg)  Weight Used For Protein Calculations: 111.1 kg (244 lb 14.9 oz)  Fluid Requirements (mL): 1ml/1kcal or per MD  Estimated Fluid Requirement Method: RDA Method  RDA Method (mL): 1930     Nutrition Prescription Ordered    Current Diet Order: 2,000 kcal Diabetic Diet    Evaluation of Received Nutrient/Fluid Intake    I/O: +3.5L since admit  Energy Calories Required: not meeting needs  Protein Required: not meeting needs  Fluid Required:  (as per MD)  Comments: LBM: not recorded.  Tolerance: tolerating  % Intake of Estimated Energy Needs: 50 - 75 %  % Meal Intake: 50 - 75 %- previously on CLD    Nutrition Risk    Level of Risk/Frequency of Follow-up:  (RD to f/u x1/week)     Monitor and Evaluation    Food and Nutrient Intake: energy intake, food and beverage intake  Food and Nutrient Adminstration: diet order  Knowledge/Beliefs/Attitudes: food and nutrition knowledge/skill, beliefs and attitudes  Physical Activity and Function: nutrition-related ADLs and IADLs  Anthropometric Measurements: weight, weight change, body mass index  Biochemical Data, Medical Tests and Procedures: electrolyte and renal panel, gastrointestinal profile, glucose/endocrine profile, inflammatory profile, lipid profile  Nutrition-Focused Physical Findings: overall appearance, skin      Nutrition Follow-Up    RD Follow-up?: Yes

## 2023-04-07 NOTE — PROGRESS NOTES
[facility]  Colorectal Surgery  Progress Note    Patient Name: KIRSTY Mason  MRN: [id number]  Admission Date: 4/6/2023  Hospital Length of Stay: 1 days  Attending Physician: MEGHAN Hong MD    Subjective:     Interval History: NAOE. Complains of abd pain on inspiration. Denies nausea and belching. Complain of LE edema which is at his baseline. Gas in colostomy bag. Has not ambulated.     Post-Op Info:  Procedure(s) (LRB):  COLECTOMY, LAPAROSCOPIC LOWER ANTERIOR, SPLENIC PLEXOR MOBILIZATION  (N/A)  SIGMOIDOSCOPY, FLEXIBLE (N/A)  CREATION, ILEOSTOMY (Right)   1 Day Post-Op      Medications:  Continuous Infusions:  Scheduled Meds:   acetaminophen  1,000 mg Intravenous Q8H    Followed by    acetaminophen  1,000 mg Oral Q8H    alvimopan  12 mg Oral BID    cetirizine  10 mg Oral Daily    gabapentin  300 mg Oral TID    ibuprofen  800 mg Oral Q8H    insulin detemir U-100  25 Units Subcutaneous QHS    mupirocin   Nasal BID     PRN Meds:   dextrose 10%    dextrose 10%    dextrose    dextrose    dextrose 50%    dextrose 50%    glucagon (human recombinant)    glucose    glucose    insulin aspart U-100    insulin aspart U-100    ondansetron    oxyCODONE    oxyCODONE    prochlorperazine    sodium chloride 0.9%    traMADoL        Objective:     Vital Signs (Most Recent):  Temp: 97.5 °F (36.4 °C) (04/07/23 0744)  Pulse: 80 (04/07/23 0744)  Resp: 20 (04/07/23 0744)  BP: (!) 109/55 (04/07/23 0744)  SpO2: (!) 93 % (04/07/23 0744)   Vital Signs (24h Range):  Temp:  [97.5 °F (36.4 °C)-98.3 °F (36.8 °C)] 97.5 °F (36.4 °C)  Pulse:  [64-86] 80  Resp:  [8-25] 20  SpO2:  [92 %-100 %] 93 %  BP: (109-139)/(55-77) 109/55     Intake/Output - Last 3 Shifts         04/05 0700 04/06 0659 04/06 0700 04/07 0659 04/07 0700 04/08 0659    I.V. (mL/kg)  1000 (9)     IV Piggyback  1600     Total Intake(mL/kg)  2600 (23.4)     Urine (mL/kg/hr)  1040 (0.4)     Stool  50     Blood  250     Total Output  1340     Net  +1260                    Physical  Exam  Constitutional:       General: He is not in acute distress.     Appearance: Normal appearance. He is not ill-appearing.   HENT:      Head: Atraumatic.   Eyes:      Extraocular Movements: Extraocular movements intact.   Cardiovascular:      Rate and Rhythm: Normal rate.   Pulmonary:      Effort: Pulmonary effort is normal.   Abdominal:      General: Abdomen is flat.      Palpations: Abdomen is soft.      Comments: Gas in ostomy bag  Incisions CDI   Musculoskeletal:         General: Normal range of motion.   Skin:     General: Skin is warm and dry.   Neurological:      General: No focal deficit present.   Psychiatric:         Mood and Affect: Mood normal.         Behavior: Behavior normal.       Significant Labs:  CBC:   Recent Labs   Lab 04/07/23  0331   WBC 10.71   RBC 3.61*   HGB 11.6*   HCT 33.4*   *   MCV 93   MCH 32.1*   MCHC 34.7     CMP (Last 3 Results):   Recent Labs   Lab 04/06/23  2259 04/07/23  0331   * 338*   CALCIUM 8.2* 8.2*   * 132*   K 4.2 4.0   CO2 23 23    100   BUN 18 18   CREATININE 1.0 1.0       Significant Diagnostics:  I have reviewed all pertinent imaging results/findings within the past 24 hours.    Assessment/Plan:     * Rectal cancer  KIRSTY Mason is an 67 y.o. male that is s/p LAR with diverting loop colostomy on 4/6. Doing well post-op.    - Pain - tylenol, ibuprofen, gabapentin, oxy prn  - Diet - regular diet  --dc IVF   - Nausea control w/ PRN antiemetics   - OOB, ambulate x3  - Encourage IS  - DVT ppx   - Continue velasquez until 4/8    - Dispo: JANETH MTZ MD  Colorectal Surgery  [facility]        I have personally obtained a history and performed a physical exam with and independent to my resident and discussed the findings and plan with the patient.  I agree with the above findings and plan with the following exceptions:  None    H, West Hong MD, FACS, FASCRS  Staff Surgeon  Dept of Colon and Rectal Surgery  [facility]  [place], [place]

## 2023-04-08 LAB
ANION GAP SERPL CALC-SCNC: 6 MMOL/L (ref 8–16)
BASOPHILS # BLD AUTO: 0.04 K/UL (ref 0–0.2)
BASOPHILS NFR BLD: 0.4 % (ref 0–1.9)
BUN SERPL-MCNC: 18 MG/DL (ref 8–23)
CALCIUM SERPL-MCNC: 8.5 MG/DL (ref 8.7–10.5)
CHLORIDE SERPL-SCNC: 104 MMOL/L (ref 95–110)
CO2 SERPL-SCNC: 25 MMOL/L (ref 23–29)
CREAT SERPL-MCNC: 0.9 MG/DL (ref 0.5–1.4)
DIFFERENTIAL METHOD: ABNORMAL
EOSINOPHIL # BLD AUTO: 0.1 K/UL (ref 0–0.5)
EOSINOPHIL NFR BLD: 1.2 % (ref 0–8)
ERYTHROCYTE [DISTWIDTH] IN BLOOD BY AUTOMATED COUNT: 12.9 % (ref 11.5–14.5)
EST. GFR  (NO RACE VARIABLE): >60 ML/MIN/1.73 M^2
GLUCOSE SERPL-MCNC: 142 MG/DL (ref 70–110)
HCT VFR BLD AUTO: 31.8 % (ref 40–54)
HGB BLD-MCNC: 10.6 G/DL (ref 14–18)
IMM GRANULOCYTES # BLD AUTO: 0.04 K/UL (ref 0–0.04)
IMM GRANULOCYTES NFR BLD AUTO: 0.4 % (ref 0–0.5)
LYMPHOCYTES # BLD AUTO: 1.5 K/UL (ref 1–4.8)
LYMPHOCYTES NFR BLD: 15.4 % (ref 18–48)
MAGNESIUM SERPL-MCNC: 2.2 MG/DL (ref 1.6–2.6)
MCH RBC QN AUTO: 31.8 PG (ref 27–31)
MCHC RBC AUTO-ENTMCNC: 33.3 G/DL (ref 32–36)
MCV RBC AUTO: 96 FL (ref 82–98)
MONOCYTES # BLD AUTO: 0.9 K/UL (ref 0.3–1)
MONOCYTES NFR BLD: 8.7 % (ref 4–15)
NEUTROPHILS # BLD AUTO: 7.3 K/UL (ref 1.8–7.7)
NEUTROPHILS NFR BLD: 73.9 % (ref 38–73)
NRBC BLD-RTO: 0 /100 WBC
PHOSPHATE SERPL-MCNC: 2 MG/DL (ref 2.7–4.5)
PLATELET # BLD AUTO: 143 K/UL (ref 150–450)
PMV BLD AUTO: 10.8 FL (ref 9.2–12.9)
POCT GLUCOSE: 104 MG/DL (ref 70–110)
POCT GLUCOSE: 121 MG/DL (ref 70–110)
POCT GLUCOSE: 128 MG/DL (ref 70–110)
POCT GLUCOSE: 158 MG/DL (ref 70–110)
POTASSIUM SERPL-SCNC: 3.4 MMOL/L (ref 3.5–5.1)
RBC # BLD AUTO: 3.33 M/UL (ref 4.6–6.2)
SODIUM SERPL-SCNC: 135 MMOL/L (ref 136–145)
WBC # BLD AUTO: 9.84 K/UL (ref 3.9–12.7)

## 2023-04-08 PROCEDURE — 85025 COMPLETE CBC W/AUTO DIFF WBC: CPT | Performed by: STUDENT IN AN ORGANIZED HEALTH CARE EDUCATION/TRAINING PROGRAM

## 2023-04-08 PROCEDURE — 20600001 HC STEP DOWN PRIVATE ROOM

## 2023-04-08 PROCEDURE — 80048 BASIC METABOLIC PNL TOTAL CA: CPT | Performed by: STUDENT IN AN ORGANIZED HEALTH CARE EDUCATION/TRAINING PROGRAM

## 2023-04-08 PROCEDURE — 36415 COLL VENOUS BLD VENIPUNCTURE: CPT | Performed by: STUDENT IN AN ORGANIZED HEALTH CARE EDUCATION/TRAINING PROGRAM

## 2023-04-08 PROCEDURE — 84100 ASSAY OF PHOSPHORUS: CPT | Performed by: STUDENT IN AN ORGANIZED HEALTH CARE EDUCATION/TRAINING PROGRAM

## 2023-04-08 PROCEDURE — 25000003 PHARM REV CODE 250: Performed by: STUDENT IN AN ORGANIZED HEALTH CARE EDUCATION/TRAINING PROGRAM

## 2023-04-08 PROCEDURE — 25000003 PHARM REV CODE 250

## 2023-04-08 PROCEDURE — 83735 ASSAY OF MAGNESIUM: CPT | Performed by: STUDENT IN AN ORGANIZED HEALTH CARE EDUCATION/TRAINING PROGRAM

## 2023-04-08 PROCEDURE — 63600175 PHARM REV CODE 636 W HCPCS: Performed by: STUDENT IN AN ORGANIZED HEALTH CARE EDUCATION/TRAINING PROGRAM

## 2023-04-08 RX ORDER — SODIUM,POTASSIUM PHOSPHATES 280-250MG
2 POWDER IN PACKET (EA) ORAL ONCE
Status: COMPLETED | OUTPATIENT
Start: 2023-04-08 | End: 2023-04-08

## 2023-04-08 RX ORDER — INSULIN ASPART 100 [IU]/ML
7-17 INJECTION, SOLUTION INTRAVENOUS; SUBCUTANEOUS
Status: DISCONTINUED | OUTPATIENT
Start: 2023-04-08 | End: 2023-04-09

## 2023-04-08 RX ORDER — ENOXAPARIN SODIUM 100 MG/ML
40 INJECTION SUBCUTANEOUS EVERY 24 HOURS
Status: DISCONTINUED | OUTPATIENT
Start: 2023-04-08 | End: 2023-04-11 | Stop reason: HOSPADM

## 2023-04-08 RX ORDER — POTASSIUM CHLORIDE 20 MEQ/1
40 TABLET, EXTENDED RELEASE ORAL ONCE
Status: DISCONTINUED | OUTPATIENT
Start: 2023-04-08 | End: 2023-04-08

## 2023-04-08 RX ADMIN — GABAPENTIN 300 MG: 300 CAPSULE ORAL at 02:04

## 2023-04-08 RX ADMIN — ACETAMINOPHEN 1000 MG: 500 TABLET ORAL at 09:04

## 2023-04-08 RX ADMIN — INSULIN ASPART 7 UNITS: 100 INJECTION, SOLUTION INTRAVENOUS; SUBCUTANEOUS at 08:04

## 2023-04-08 RX ADMIN — ALVIMOPAN 12 MG: 12 CAPSULE ORAL at 08:04

## 2023-04-08 RX ADMIN — IBUPROFEN 800 MG: 400 TABLET ORAL at 02:04

## 2023-04-08 RX ADMIN — IBUPROFEN 800 MG: 400 TABLET ORAL at 09:04

## 2023-04-08 RX ADMIN — ACETAMINOPHEN 1000 MG: 500 TABLET ORAL at 06:04

## 2023-04-08 RX ADMIN — INSULIN ASPART 7 UNITS: 100 INJECTION, SOLUTION INTRAVENOUS; SUBCUTANEOUS at 11:04

## 2023-04-08 RX ADMIN — CETIRIZINE HYDROCHLORIDE 10 MG: 10 TABLET, FILM COATED ORAL at 08:04

## 2023-04-08 RX ADMIN — ENOXAPARIN SODIUM 40 MG: 40 INJECTION SUBCUTANEOUS at 05:04

## 2023-04-08 RX ADMIN — INSULIN ASPART 17 UNITS: 100 INJECTION, SOLUTION INTRAVENOUS; SUBCUTANEOUS at 04:04

## 2023-04-08 RX ADMIN — IBUPROFEN 800 MG: 400 TABLET ORAL at 06:04

## 2023-04-08 RX ADMIN — ACETAMINOPHEN 1000 MG: 500 TABLET ORAL at 02:04

## 2023-04-08 RX ADMIN — INSULIN ASPART 2 UNITS: 100 INJECTION, SOLUTION INTRAVENOUS; SUBCUTANEOUS at 11:04

## 2023-04-08 RX ADMIN — GABAPENTIN 300 MG: 300 CAPSULE ORAL at 08:04

## 2023-04-08 RX ADMIN — POTASSIUM & SODIUM PHOSPHATES POWDER PACK 280-160-250 MG 2 PACKET: 280-160-250 PACK at 11:04

## 2023-04-08 RX ADMIN — MUPIROCIN: 20 OINTMENT TOPICAL at 12:04

## 2023-04-08 RX ADMIN — INSULIN DETEMIR 38 UNITS: 100 INJECTION, SOLUTION SUBCUTANEOUS at 09:04

## 2023-04-08 RX ADMIN — MUPIROCIN: 20 OINTMENT TOPICAL at 09:04

## 2023-04-08 RX ADMIN — GABAPENTIN 300 MG: 300 CAPSULE ORAL at 09:04

## 2023-04-08 NOTE — PROGRESS NOTES
[facility]  Colorectal Surgery  Progress Note    Patient Name: KIRSTY Mason  MRN: [id number]  Admission Date: 4/6/2023  Hospital Length of Stay: 2 days  Attending Physician: MEGHAN Hong MD    Subjective:     Interval History: NAOE. Bowers removed this morning. Gas and stool in ostomy bag. Ambulated. Tolerating diet without N/V.     Post-Op Info:  Procedure(s) (LRB):  COLECTOMY, LAPAROSCOPIC LOWER ANTERIOR, SPLENIC PLEXOR MOBILIZATION  (N/A)  SIGMOIDOSCOPY, FLEXIBLE (N/A)  CREATION, ILEOSTOMY (Right)   2 Days Post-Op      Medications:  Continuous Infusions:  Scheduled Meds:   acetaminophen  1,000 mg Oral Q8H    alvimopan  12 mg Oral BID    cetirizine  10 mg Oral Daily    gabapentin  300 mg Oral TID    ibuprofen  800 mg Oral Q8H    insulin aspart U-100  7-17 Units Subcutaneous TIDWM    insulin detemir U-100  38 Units Subcutaneous QHS    mupirocin   Nasal BID     PRN Meds:   dextrose 10%    dextrose 10%    dextrose    dextrose    glucagon (human recombinant)    glucagon (human recombinant)    insulin aspart U-100    ondansetron    oxyCODONE    oxyCODONE    prochlorperazine    sodium chloride 0.9%    traMADoL        Objective:     Vital Signs (Most Recent):  Temp: 97.9 °F (36.6 °C) (04/08/23 0729)  Pulse: 82 (04/08/23 0729)  Resp: 20 (04/08/23 0729)  BP: 126/60 (04/08/23 0729)  SpO2: (!) 90 % (04/08/23 0729)   Vital Signs (24h Range):  Temp:  [97.3 °F (36.3 °C)-98.3 °F (36.8 °C)] 97.9 °F (36.6 °C)  Pulse:  [82-91] 82  Resp:  [16-20] 20  SpO2:  [89 %-99 %] 90 %  BP: (110-134)/(56-61) 126/60     Intake/Output - Last 3 Shifts         04/06 0700  04/07 0659 04/07 0700  04/08 0659 04/08 0700  04/09 0659    P.O.  440     I.V. (mL/kg) 1000 (9)      Other  0     IV Piggyback 1800      Total Intake(mL/kg) 2800 (25.2) 440 (4)     Urine (mL/kg/hr) 1040 (0.4) 1100 (0.4)     Stool 50 200     Blood 250      Total Output 1340 1300     Net +1460 -860                    Physical Exam  Constitutional:        General: He is not in acute distress.     Appearance: Normal appearance. He is not ill-appearing.   HENT:      Head: Atraumatic.   Eyes:      Extraocular Movements: Extraocular movements intact.   Cardiovascular:      Rate and Rhythm: Normal rate.   Pulmonary:      Effort: Pulmonary effort is normal.   Abdominal:      General: Abdomen is flat.      Palpations: Abdomen is soft.      Comments: Gas in ostomy bag  Incisions CDI   Musculoskeletal:         General: Normal range of motion.   Skin:     General: Skin is warm and dry.   Neurological:      General: No focal deficit present.   Psychiatric:         Mood and Affect: Mood normal.         Behavior: Behavior normal.     Significant Labs:  CBC:   Recent Labs   Lab 04/07/23  0331   WBC 10.71   RBC 3.61*   HGB 11.6*   HCT 33.4*   *   MCV 93   MCH 32.1*   MCHC 34.7     CMP:   Recent Labs   Lab 04/08/23  0626   *   CALCIUM 8.5*   *   K 3.4*   CO2 25      BUN 18   CREATININE 0.9       Significant Diagnostics:  I have reviewed all pertinent imaging results/findings within the past 24 hours.    Assessment/Plan:     * Rectal cancer  KIRSTY Mason is an 67 y.o. male that is s/p LAR with diverting loop colostomy on 4/6. Doing well post-op.    - Pain - tylenol, ibuprofen, gabapentin, oxy prn  - Diet - regular diet  - Nausea control w/ PRN antiemetics   - OOB, ambulate x3  - Encourage IS  - DVT ppx   -Bowers removed. VT today.   -Stoma teaching on Monday    - Dispo: JANETH MTZ MD  Colorectal Surgery  [facility]

## 2023-04-08 NOTE — PLAN OF CARE
Patient remained free from falls and injury throughout shift. No acute events overnight. Patient alert and oriented x4; vital signs stable. Lap sites and incisions RUDY with dermabond; approximated and CDI. Bowers remains in place--plan to dc 4/8. Ileostomy with minimal output; flatus noted. Up with walker/standby assist. Safety measures addressed --bed locked and in low position with siderails up x2 and call light/personal items within reach. Plan of care reviewed with patient and spouse; all questions and concerns addressed. Patient verbalizes understanding.        Problem: Adult Inpatient Plan of Care  Goal: Plan of Care Review  Outcome: Ongoing, Progressing  Goal: Patient-Specific Goal (Individualized)  Outcome: Ongoing, Progressing  Goal: Absence of Hospital-Acquired Illness or Injury  Outcome: Ongoing, Progressing  Goal: Optimal Comfort and Wellbeing  Outcome: Ongoing, Progressing  Goal: Readiness for Transition of Care  Outcome: Ongoing, Progressing     Problem: Diabetes Comorbidity  Goal: Blood Glucose Level Within Targeted Range  Outcome: Ongoing, Progressing     Problem: Infection  Goal: Absence of Infection Signs and Symptoms  Outcome: Ongoing, Progressing     Problem: Fall Injury Risk  Goal: Absence of Fall and Fall-Related Injury  Outcome: Ongoing, Progressing

## 2023-04-08 NOTE — CARE UPDATE
-Glucose Goal 140-180    -A1C:   Hemoglobin A1C   Date Value Ref Range Status   03/28/2023 9.3 (H) 4.0 - 5.6 % Final     Comment:     ADA Screening Guidelines:  5.7-6.4%  Consistent with prediabetes  >or=6.5%  Consistent with diabetes    High levels of fetal hemoglobin interfere with the HbA1C  assay. Heterozygous hemoglobin variants (HbS, HgC, etc)do  not significantly interfere with this assay.   However, presence of multiple variants may affect accuracy.           -HOME REGIMEN: Levemir 25 units BID, Novolog 22 units with meals       -GLUCOSE TREND FOR THE PAST 24HRS:   Recent Labs   Lab 04/06/23  1709 04/06/23  2252 04/07/23  0747 04/07/23  1114 04/07/23  1524 04/07/23  1951   POCTGLUCOSE 343* 329* 303* 260* 251* 249*         -NO HYPOGYCEMIAS NOTED     - Diet  Diet diabetic [facility]; 2000 Calorie; Consistent Carbohydrate    T2DM.  S/p LAR with diverting loop colostomy on 4/6.   BG above goal here. 2am bg check not done. Will increase prandial by 20% and monitor on basal increased yesterday.     - -Continue Levemir  38 units daily   - Increase Novolog mealtime insulin based on intake 7-17units with meals. Administer 7 units if patient eats 25% -  50% and 17 units if patient eats 50% or more.  - Continue Mod dose correction ISF 25 starting at 150.  - POCT Glucose before meals, at bedtime and at 2 am  - Hypoglycemia protocol in place      ** Please notify Endocrine for any change and/or advance in diet**  ** Please call Endocrine for any BG related issues **     Discharge Planning:   TBD. Please notify endocrinology prior to discharge.

## 2023-04-08 NOTE — SUBJECTIVE & OBJECTIVE
Subjective:     Interval History: NAOE. Bowers removed this morning. Gas and stool in ostomy bag. Ambulated. Tolerating diet without N/V.     Post-Op Info:  Procedure(s) (LRB):  COLECTOMY, LAPAROSCOPIC LOWER ANTERIOR, SPLENIC PLEXOR MOBILIZATION  (N/A)  SIGMOIDOSCOPY, FLEXIBLE (N/A)  CREATION, ILEOSTOMY (Right)   2 Days Post-Op      Medications:  Continuous Infusions:  Scheduled Meds:   acetaminophen  1,000 mg Oral Q8H    alvimopan  12 mg Oral BID    cetirizine  10 mg Oral Daily    gabapentin  300 mg Oral TID    ibuprofen  800 mg Oral Q8H    insulin aspart U-100  7-17 Units Subcutaneous TIDWM    insulin detemir U-100  38 Units Subcutaneous QHS    mupirocin   Nasal BID     PRN Meds:   dextrose 10%    dextrose 10%    dextrose    dextrose    glucagon (human recombinant)    glucagon (human recombinant)    insulin aspart U-100    ondansetron    oxyCODONE    oxyCODONE    prochlorperazine    sodium chloride 0.9%    traMADoL        Objective:     Vital Signs (Most Recent):  Temp: 97.9 °F (36.6 °C) (04/08/23 0729)  Pulse: 82 (04/08/23 0729)  Resp: 20 (04/08/23 0729)  BP: 126/60 (04/08/23 0729)  SpO2: (!) 90 % (04/08/23 0729)   Vital Signs (24h Range):  Temp:  [97.3 °F (36.3 °C)-98.3 °F (36.8 °C)] 97.9 °F (36.6 °C)  Pulse:  [82-91] 82  Resp:  [16-20] 20  SpO2:  [89 %-99 %] 90 %  BP: (110-134)/(56-61) 126/60     Intake/Output - Last 3 Shifts         04/06 0700 04/07 0659 04/07 0700 04/08 0659 04/08 0700 04/09 0659    P.O.  440     I.V. (mL/kg) 1000 (9)      Other  0     IV Piggyback 1800      Total Intake(mL/kg) 2800 (25.2) 440 (4)     Urine (mL/kg/hr) 1040 (0.4) 1100 (0.4)     Stool 50 200     Blood 250      Total Output 1340 1300     Net +1460 -860                    Physical Exam  Constitutional:       General: He is not in acute distress.     Appearance: Normal appearance. He is not ill-appearing.   HENT:      Head: Atraumatic.   Eyes:      Extraocular Movements: Extraocular movements intact.   Cardiovascular:       Rate and Rhythm: Normal rate.   Pulmonary:      Effort: Pulmonary effort is normal.   Abdominal:      General: Abdomen is flat.      Palpations: Abdomen is soft.      Comments: Gas in ostomy bag  Incisions CDI   Musculoskeletal:         General: Normal range of motion.   Skin:     General: Skin is warm and dry.   Neurological:      General: No focal deficit present.   Psychiatric:         Mood and Affect: Mood normal.         Behavior: Behavior normal.     Significant Labs:  CBC:   Recent Labs   Lab 04/07/23  0331   WBC 10.71   RBC 3.61*   HGB 11.6*   HCT 33.4*   *   MCV 93   MCH 32.1*   MCHC 34.7     CMP:   Recent Labs   Lab 04/08/23  0626   *   CALCIUM 8.5*   *   K 3.4*   CO2 25      BUN 18   CREATININE 0.9       Significant Diagnostics:  I have reviewed all pertinent imaging results/findings within the past 24 hours.

## 2023-04-08 NOTE — ASSESSMENT & PLAN NOTE
KIRSTY Mason is an 67 y.o. male that is s/p LAR with diverting loop colostomy on 4/6. Doing well post-op.    - Pain - tylenol, ibuprofen, gabapentin, oxy prn  - Diet - regular diet  - Nausea control w/ PRN antiemetics   - OOB, ambulate x3  - Encourage IS  - DVT ppx   -Bowers removed. VT today.   -Stoma teaching on Monday    - Dispo: [facility]

## 2023-04-08 NOTE — NURSING
Patient is alert and oriented x4. Vital signs stable on room air. Respirations even and unlabored. Bowers removed at 0700. Patient voiding adequately throughout shift. Ileostomy in RUQ draining liquid brown/green stool. Lap sites clean and intact with dermabond. No complaints of pain throughout shift. Chair in low and locked position and call light within reach. Spouse at bedside.

## 2023-04-08 NOTE — NURSING
Bowers removed without difficulty. Patient tolerated well. RN educated patient and spouse on needing to void by 6 hour lazarus (@1300). Encouraged PO fluid intake and ambulation.

## 2023-04-09 LAB
ANION GAP SERPL CALC-SCNC: 10 MMOL/L (ref 8–16)
BASOPHILS # BLD AUTO: 0.03 K/UL (ref 0–0.2)
BASOPHILS NFR BLD: 0.4 % (ref 0–1.9)
BUN SERPL-MCNC: 13 MG/DL (ref 8–23)
CALCIUM SERPL-MCNC: 8.6 MG/DL (ref 8.7–10.5)
CHLORIDE SERPL-SCNC: 105 MMOL/L (ref 95–110)
CO2 SERPL-SCNC: 23 MMOL/L (ref 23–29)
CREAT SERPL-MCNC: 0.9 MG/DL (ref 0.5–1.4)
CRP SERPL-MCNC: 217.8 MG/L (ref 0–8.2)
DIFFERENTIAL METHOD: ABNORMAL
EOSINOPHIL # BLD AUTO: 0.1 K/UL (ref 0–0.5)
EOSINOPHIL NFR BLD: 1.7 % (ref 0–8)
ERYTHROCYTE [DISTWIDTH] IN BLOOD BY AUTOMATED COUNT: 13 % (ref 11.5–14.5)
EST. GFR  (NO RACE VARIABLE): >60 ML/MIN/1.73 M^2
GLUCOSE SERPL-MCNC: 69 MG/DL (ref 70–110)
HCT VFR BLD AUTO: 31.9 % (ref 40–54)
HGB BLD-MCNC: 10.6 G/DL (ref 14–18)
IMM GRANULOCYTES # BLD AUTO: 0.03 K/UL (ref 0–0.04)
IMM GRANULOCYTES NFR BLD AUTO: 0.4 % (ref 0–0.5)
LYMPHOCYTES # BLD AUTO: 1.6 K/UL (ref 1–4.8)
LYMPHOCYTES NFR BLD: 19.2 % (ref 18–48)
MAGNESIUM SERPL-MCNC: 2 MG/DL (ref 1.6–2.6)
MCH RBC QN AUTO: 31.6 PG (ref 27–31)
MCHC RBC AUTO-ENTMCNC: 33.2 G/DL (ref 32–36)
MCV RBC AUTO: 95 FL (ref 82–98)
MONOCYTES # BLD AUTO: 0.9 K/UL (ref 0.3–1)
MONOCYTES NFR BLD: 10.4 % (ref 4–15)
NEUTROPHILS # BLD AUTO: 5.8 K/UL (ref 1.8–7.7)
NEUTROPHILS NFR BLD: 67.9 % (ref 38–73)
NRBC BLD-RTO: 0 /100 WBC
PHOSPHATE SERPL-MCNC: 3.1 MG/DL (ref 2.7–4.5)
PLATELET # BLD AUTO: 143 K/UL (ref 150–450)
PMV BLD AUTO: 10.7 FL (ref 9.2–12.9)
POCT GLUCOSE: 102 MG/DL (ref 70–110)
POCT GLUCOSE: 109 MG/DL (ref 70–110)
POCT GLUCOSE: 184 MG/DL (ref 70–110)
POCT GLUCOSE: 66 MG/DL (ref 70–110)
POCT GLUCOSE: 66 MG/DL (ref 70–110)
POCT GLUCOSE: 71 MG/DL (ref 70–110)
POCT GLUCOSE: 77 MG/DL (ref 70–110)
POTASSIUM SERPL-SCNC: 3.4 MMOL/L (ref 3.5–5.1)
RBC # BLD AUTO: 3.35 M/UL (ref 4.6–6.2)
SODIUM SERPL-SCNC: 138 MMOL/L (ref 136–145)
WBC # BLD AUTO: 8.45 K/UL (ref 3.9–12.7)

## 2023-04-09 PROCEDURE — 86140 C-REACTIVE PROTEIN: CPT | Performed by: STUDENT IN AN ORGANIZED HEALTH CARE EDUCATION/TRAINING PROGRAM

## 2023-04-09 PROCEDURE — 85025 COMPLETE CBC W/AUTO DIFF WBC: CPT | Performed by: STUDENT IN AN ORGANIZED HEALTH CARE EDUCATION/TRAINING PROGRAM

## 2023-04-09 PROCEDURE — 83735 ASSAY OF MAGNESIUM: CPT | Performed by: STUDENT IN AN ORGANIZED HEALTH CARE EDUCATION/TRAINING PROGRAM

## 2023-04-09 PROCEDURE — 84100 ASSAY OF PHOSPHORUS: CPT | Performed by: STUDENT IN AN ORGANIZED HEALTH CARE EDUCATION/TRAINING PROGRAM

## 2023-04-09 PROCEDURE — 25000003 PHARM REV CODE 250: Performed by: STUDENT IN AN ORGANIZED HEALTH CARE EDUCATION/TRAINING PROGRAM

## 2023-04-09 PROCEDURE — 63600175 PHARM REV CODE 636 W HCPCS: Performed by: STUDENT IN AN ORGANIZED HEALTH CARE EDUCATION/TRAINING PROGRAM

## 2023-04-09 PROCEDURE — 36415 COLL VENOUS BLD VENIPUNCTURE: CPT | Performed by: STUDENT IN AN ORGANIZED HEALTH CARE EDUCATION/TRAINING PROGRAM

## 2023-04-09 PROCEDURE — 20600001 HC STEP DOWN PRIVATE ROOM

## 2023-04-09 PROCEDURE — 80048 BASIC METABOLIC PNL TOTAL CA: CPT | Performed by: STUDENT IN AN ORGANIZED HEALTH CARE EDUCATION/TRAINING PROGRAM

## 2023-04-09 PROCEDURE — 25000003 PHARM REV CODE 250: Performed by: PHYSICIAN ASSISTANT

## 2023-04-09 RX ORDER — INSULIN ASPART 100 [IU]/ML
7-15 INJECTION, SOLUTION INTRAVENOUS; SUBCUTANEOUS
Status: DISCONTINUED | OUTPATIENT
Start: 2023-04-09 | End: 2023-04-10

## 2023-04-09 RX ORDER — INSULIN ASPART 100 [IU]/ML
7-15 INJECTION, SOLUTION INTRAVENOUS; SUBCUTANEOUS
Status: DISCONTINUED | OUTPATIENT
Start: 2023-04-09 | End: 2023-04-09

## 2023-04-09 RX ADMIN — IBUPROFEN 800 MG: 400 TABLET ORAL at 05:04

## 2023-04-09 RX ADMIN — INSULIN ASPART 2 UNITS: 100 INJECTION, SOLUTION INTRAVENOUS; SUBCUTANEOUS at 12:04

## 2023-04-09 RX ADMIN — IBUPROFEN 800 MG: 400 TABLET ORAL at 02:04

## 2023-04-09 RX ADMIN — MUPIROCIN: 20 OINTMENT TOPICAL at 09:04

## 2023-04-09 RX ADMIN — GABAPENTIN 300 MG: 300 CAPSULE ORAL at 09:04

## 2023-04-09 RX ADMIN — ENOXAPARIN SODIUM 40 MG: 40 INJECTION SUBCUTANEOUS at 04:04

## 2023-04-09 RX ADMIN — GABAPENTIN 300 MG: 300 CAPSULE ORAL at 08:04

## 2023-04-09 RX ADMIN — GABAPENTIN 300 MG: 300 CAPSULE ORAL at 02:04

## 2023-04-09 RX ADMIN — INSULIN ASPART 3 UNITS: 100 INJECTION, SOLUTION INTRAVENOUS; SUBCUTANEOUS at 08:04

## 2023-04-09 RX ADMIN — Medication 15000 MG: at 04:04

## 2023-04-09 RX ADMIN — CETIRIZINE HYDROCHLORIDE 10 MG: 10 TABLET, FILM COATED ORAL at 08:04

## 2023-04-09 RX ADMIN — ACETAMINOPHEN 1000 MG: 500 TABLET ORAL at 05:04

## 2023-04-09 RX ADMIN — MUPIROCIN: 20 OINTMENT TOPICAL at 08:04

## 2023-04-09 RX ADMIN — ACETAMINOPHEN 1000 MG: 500 TABLET ORAL at 02:04

## 2023-04-09 RX ADMIN — INSULIN ASPART 15 UNITS: 100 INJECTION, SOLUTION INTRAVENOUS; SUBCUTANEOUS at 12:04

## 2023-04-09 RX ADMIN — IBUPROFEN 800 MG: 400 TABLET ORAL at 09:04

## 2023-04-09 RX ADMIN — ACETAMINOPHEN 1000 MG: 500 TABLET ORAL at 09:04

## 2023-04-09 NOTE — NURSING
Patient is alert and oriented x4. Vital signs stable on room air. Respirations even and unlabored. Morning blood sugar 71[facility] half the dose of insulin and administer to patient. Patient received 3 unites of insulin aspart. Ileostomy in RUQ draining liquid brown/green stool. Lap sites clean and intact with dermabond. No complaints of pain throughout shift. Blood sugar at 1631-66, asymptomatic. 15,000 mg of glucose gel given. Recheck at 1659-102. Chair in low and locked position and call light within reach. No complaints at this time. Spouse at bedside.

## 2023-04-09 NOTE — ASSESSMENT & PLAN NOTE
KIRSTY Mason is an 67 y.o. male that is s/p LAR with diverting loop colostomy on 4/6. Doing well post-op.    - Pain - tylenol, ibuprofen, gabapentin, oxy prn  - Diet - regular diet  - Nausea control w/ PRN antiemetics   - OOB, ambulate x3  - Encourage IS  - DVT ppx   - voiding.   -Stoma teaching on Monday    - Dispo: [facility]

## 2023-04-09 NOTE — PROGRESS NOTES
[facility]  Colorectal Surgery  Progress Note    Patient Name: KIRSTY Mason  MRN: [id number]  Admission Date: 4/6/2023  Hospital Length of Stay: 3 days  Attending Physician: MEGHAN Hong MD    Subjective:     Interval History: NAOE. voiding. Gas and stool in ostomy bag. Ambulated. Tolerating diet without N/V.     Post-Op Info:  Procedure(s) (LRB):  COLECTOMY, LAPAROSCOPIC LOWER ANTERIOR, SPLENIC PLEXOR MOBILIZATION  (N/A)  SIGMOIDOSCOPY, FLEXIBLE (N/A)  CREATION, ILEOSTOMY (Right)   3 Days Post-Op      Medications:  Continuous Infusions:  Scheduled Meds:   acetaminophen  1,000 mg Oral Q8H    cetirizine  10 mg Oral Daily    enoxaparin  40 mg Subcutaneous Daily    gabapentin  300 mg Oral TID    ibuprofen  800 mg Oral Q8H    insulin aspart U-100  7-15 Units Subcutaneous TIDWM    insulin detemir U-100  33 Units Subcutaneous QHS    mupirocin   Nasal BID     PRN Meds:   dextrose 10%    dextrose 10%    dextrose    dextrose    glucagon (human recombinant)    glucagon (human recombinant)    insulin aspart U-100    ondansetron    oxyCODONE    oxyCODONE    prochlorperazine    sodium chloride 0.9%    traMADoL        Objective:     Vital Signs (Most Recent):  Temp: 98 °F (36.7 °C) (04/09/23 0713)  Pulse: 72 (04/09/23 0713)  Resp: 16 (04/09/23 0713)  BP: 134/63 (04/09/23 0713)  SpO2: (!) 93 % (04/09/23 0713)   Vital Signs (24h Range):  Temp:  [97.1 °F (36.2 °C)-98.4 °F (36.9 °C)] 98 °F (36.7 °C)  Pulse:  [72-86] 72  Resp:  [16-20] 16  SpO2:  [90 %-95 %] 93 %  BP: (103-160)/(51-70) 134/63     Intake/Output - Last 3 Shifts         04/07 0700  04/08 0659 04/08 0700  04/09 0659 04/09 0700  04/10 0659    P.O. 440 1200     I.V. (mL/kg)       Other 0      IV Piggyback       Total Intake(mL/kg) 440 (4) 1200 (10.8)     Urine (mL/kg/hr) 1100 (0.4) 1000 (0.4)     Stool 200 300     Blood       Total Output 1300 1300     Net -860 -100            Stool Occurrence  0 x             Physical Exam  Constitutional:        General: He is not in acute distress.     Appearance: Normal appearance. He is not ill-appearing.   HENT:      Head: Atraumatic.   Eyes:      Extraocular Movements: Extraocular movements intact.   Cardiovascular:      Rate and Rhythm: Normal rate.   Pulmonary:      Effort: Pulmonary effort is normal.   Abdominal:      General: Abdomen is flat.      Palpations: Abdomen is soft.      Comments: Gas in ostomy bag  Incisions CDI   Musculoskeletal:         General: Normal range of motion.   Skin:     General: Skin is warm and dry.   Neurological:      General: No focal deficit present.   Psychiatric:         Mood and Affect: Mood normal.         Behavior: Behavior normal.     Significant Labs:  CBC:   Recent Labs   Lab 04/09/23  0519   WBC 8.45   RBC 3.35*   HGB 10.6*   HCT 31.9*   *   MCV 95   MCH 31.6*   MCHC 33.2       CMP:   Recent Labs   Lab 04/09/23 0519   GLU 69*   CALCIUM 8.6*      K 3.4*   CO2 23      BUN 13   CREATININE 0.9         Significant Diagnostics:  I have reviewed all pertinent imaging results/findings within the past 24 hours.    Assessment/Plan:     * Rectal cancer  KIRSTY Mason is an 67 y.o. male that is s/p LAR with diverting loop colostomy on 4/6. Doing well post-op.    - Pain - tylenol, ibuprofen, gabapentin, oxy prn  - Diet - regular diet  - Nausea control w/ PRN antiemetics   - OOB, ambulate x3  - Encourage IS  - DVT ppx   - voiding.   -Stoma teaching on Monday    - Dispo: JANETH WOLFF MD  Colorectal Surgery  [facility]

## 2023-04-09 NOTE — NURSING
Patient's CBG was 66. Treated per MAR with 15g oral glucose gel. Will recheck CBG in 15 mins.     Recheck CBG 77.

## 2023-04-09 NOTE — PLAN OF CARE
Patient remained free from falls and injury throughout shift. No acute events overnight. Patient alert and oriented x4; vital signs stable. Patient ambulating well with standby assistance. Ileostomy with liquid stool and flatus. Pain well managed with scheduled tylenol and ibuprofen. Abdominal incisions approximated with dermabond without erythema or drainage. Spouse at bedside. Safety measures addressed --bed locked and in low position with siderails up x2 and call light/personal items within reach. Plan of care reviewed with patient; all questions and concerns addressed. Patient verbalizes understanding.          Problem: Adult Inpatient Plan of Care  Goal: Plan of Care Review  Outcome: Ongoing, Progressing  Goal: Patient-Specific Goal (Individualized)  Outcome: Ongoing, Progressing  Goal: Absence of Hospital-Acquired Illness or Injury  Outcome: Ongoing, Progressing  Goal: Optimal Comfort and Wellbeing  Outcome: Ongoing, Progressing  Goal: Readiness for Transition of Care  Outcome: Ongoing, Progressing     Problem: Diabetes Comorbidity  Goal: Blood Glucose Level Within Targeted Range  Outcome: Ongoing, Progressing     Problem: Infection  Goal: Absence of Infection Signs and Symptoms  Outcome: Ongoing, Progressing     Problem: Fall Injury Risk  Goal: Absence of Fall and Fall-Related Injury  Outcome: Ongoing, Progressing

## 2023-04-09 NOTE — CARE UPDATE
-Glucose Goal 140-180    -A1C:   Hemoglobin A1C   Date Value Ref Range Status   03/28/2023 9.3 (H) 4.0 - 5.6 % Final     Comment:     ADA Screening Guidelines:  5.7-6.4%  Consistent with prediabetes  >or=6.5%  Consistent with diabetes    High levels of fetal hemoglobin interfere with the HbA1C  assay. Heterozygous hemoglobin variants (HbS, HgC, etc)do  not significantly interfere with this assay.   However, presence of multiple variants may affect accuracy.           -HOME REGIMEN: Levemir 25 units BID, Novolog 22 units with meals       -GLUCOSE TREND FOR THE PAST 24HRS:   Recent Labs   Lab 04/08/23  0726 04/08/23  1105 04/08/23  1525 04/08/23  2108 04/09/23  0437 04/09/23  0516   POCTGLUCOSE 128* 158* 121* 104 66* 77           -NO HYPOGYCEMIAS NOTED     - Diet  Diet diabetic [facility]; 2000 Calorie; Consistent Carbohydrate    T2DM.  S/p LAR with diverting loop colostomy on 4/6.   BG at or below goal.  BG of 66 overnight requiring glucose.   Will decrease long acting and mealtime insulin     - -Decrease Levemir  33 units daily   - Decrease Novolog mealtime insulin based on intake 7-15 units with meals. Administer 7 units if patient eats 25% -  50% and 15 units if patient eats 50% or more.  - Continue Mod dose correction ISF 25 starting at 150.  - POCT Glucose before meals, at bedtime and at 2 am  - Hypoglycemia protocol in place      ** Please notify Endocrine for any change and/or advance in diet**  ** Please call Endocrine for any BG related issues **     Discharge Planning:   TBD. Please notify endocrinology prior to discharge.

## 2023-04-09 NOTE — SUBJECTIVE & OBJECTIVE
Subjective:     Interval History: NAOE. voiding. Gas and stool in ostomy bag. Ambulated. Tolerating diet without N/V.     Post-Op Info:  Procedure(s) (LRB):  COLECTOMY, LAPAROSCOPIC LOWER ANTERIOR, SPLENIC PLEXOR MOBILIZATION  (N/A)  SIGMOIDOSCOPY, FLEXIBLE (N/A)  CREATION, ILEOSTOMY (Right)   3 Days Post-Op      Medications:  Continuous Infusions:  Scheduled Meds:   acetaminophen  1,000 mg Oral Q8H    cetirizine  10 mg Oral Daily    enoxaparin  40 mg Subcutaneous Daily    gabapentin  300 mg Oral TID    ibuprofen  800 mg Oral Q8H    insulin aspart U-100  7-15 Units Subcutaneous TIDWM    insulin detemir U-100  33 Units Subcutaneous QHS    mupirocin   Nasal BID     PRN Meds:   dextrose 10%    dextrose 10%    dextrose    dextrose    glucagon (human recombinant)    glucagon (human recombinant)    insulin aspart U-100    ondansetron    oxyCODONE    oxyCODONE    prochlorperazine    sodium chloride 0.9%    traMADoL        Objective:     Vital Signs (Most Recent):  Temp: 98 °F (36.7 °C) (04/09/23 0713)  Pulse: 72 (04/09/23 0713)  Resp: 16 (04/09/23 0713)  BP: 134/63 (04/09/23 0713)  SpO2: (!) 93 % (04/09/23 0713)   Vital Signs (24h Range):  Temp:  [97.1 °F (36.2 °C)-98.4 °F (36.9 °C)] 98 °F (36.7 °C)  Pulse:  [72-86] 72  Resp:  [16-20] 16  SpO2:  [90 %-95 %] 93 %  BP: (103-160)/(51-70) 134/63     Intake/Output - Last 3 Shifts         04/07 0700  04/08 0659 04/08 0700 04/09 0659 04/09 0700  04/10 0659    P.O. 440 1200     I.V. (mL/kg)       Other 0      IV Piggyback       Total Intake(mL/kg) 440 (4) 1200 (10.8)     Urine (mL/kg/hr) 1100 (0.4) 1000 (0.4)     Stool 200 300     Blood       Total Output 1300 1300     Net -860 -100            Stool Occurrence  0 x             Physical Exam  Constitutional:       General: He is not in acute distress.     Appearance: Normal appearance. He is not ill-appearing.   HENT:      Head: Atraumatic.   Eyes:      Extraocular Movements: Extraocular movements intact.   Cardiovascular:       Rate and Rhythm: Normal rate.   Pulmonary:      Effort: Pulmonary effort is normal.   Abdominal:      General: Abdomen is flat.      Palpations: Abdomen is soft.      Comments: Gas in ostomy bag  Incisions CDI   Musculoskeletal:         General: Normal range of motion.   Skin:     General: Skin is warm and dry.   Neurological:      General: No focal deficit present.   Psychiatric:         Mood and Affect: Mood normal.         Behavior: Behavior normal.     Significant Labs:  CBC:   Recent Labs   Lab 04/09/23  0519   WBC 8.45   RBC 3.35*   HGB 10.6*   HCT 31.9*   *   MCV 95   MCH 31.6*   MCHC 33.2       CMP:   Recent Labs   Lab 04/09/23  0519   GLU 69*   CALCIUM 8.6*      K 3.4*   CO2 23      BUN 13   CREATININE 0.9         Significant Diagnostics:  I have reviewed all pertinent imaging results/findings within the past 24 hours.

## 2023-04-10 LAB
ANION GAP SERPL CALC-SCNC: 15 MMOL/L (ref 8–16)
BASOPHILS # BLD AUTO: 0.04 K/UL (ref 0–0.2)
BASOPHILS NFR BLD: 0.4 % (ref 0–1.9)
BUN SERPL-MCNC: 18 MG/DL (ref 8–23)
CALCIUM SERPL-MCNC: 9.5 MG/DL (ref 8.7–10.5)
CHLORIDE SERPL-SCNC: 102 MMOL/L (ref 95–110)
CO2 SERPL-SCNC: 20 MMOL/L (ref 23–29)
CREAT SERPL-MCNC: 0.8 MG/DL (ref 0.5–1.4)
CRP SERPL-MCNC: 148 MG/L (ref 0–8.2)
DIFFERENTIAL METHOD: ABNORMAL
EOSINOPHIL # BLD AUTO: 0 K/UL (ref 0–0.5)
EOSINOPHIL NFR BLD: 0.4 % (ref 0–8)
ERYTHROCYTE [DISTWIDTH] IN BLOOD BY AUTOMATED COUNT: 12.9 % (ref 11.5–14.5)
EST. GFR  (NO RACE VARIABLE): >60 ML/MIN/1.73 M^2
GLUCOSE SERPL-MCNC: 175 MG/DL (ref 70–110)
HCT VFR BLD AUTO: 37.9 % (ref 40–54)
HGB BLD-MCNC: 12.8 G/DL (ref 14–18)
IMM GRANULOCYTES # BLD AUTO: 0.1 K/UL (ref 0–0.04)
IMM GRANULOCYTES NFR BLD AUTO: 1 % (ref 0–0.5)
LYMPHOCYTES # BLD AUTO: 0.9 K/UL (ref 1–4.8)
LYMPHOCYTES NFR BLD: 9.1 % (ref 18–48)
MAGNESIUM SERPL-MCNC: 2 MG/DL (ref 1.6–2.6)
MCH RBC QN AUTO: 31.4 PG (ref 27–31)
MCHC RBC AUTO-ENTMCNC: 33.8 G/DL (ref 32–36)
MCV RBC AUTO: 93 FL (ref 82–98)
MONOCYTES # BLD AUTO: 0.5 K/UL (ref 0.3–1)
MONOCYTES NFR BLD: 5.2 % (ref 4–15)
NEUTROPHILS # BLD AUTO: 8.5 K/UL (ref 1.8–7.7)
NEUTROPHILS NFR BLD: 83.9 % (ref 38–73)
NRBC BLD-RTO: 0 /100 WBC
PHOSPHATE SERPL-MCNC: 3.9 MG/DL (ref 2.7–4.5)
PLATELET # BLD AUTO: 233 K/UL (ref 150–450)
PMV BLD AUTO: 9.3 FL (ref 9.2–12.9)
POCT GLUCOSE: 125 MG/DL (ref 70–110)
POCT GLUCOSE: 158 MG/DL (ref 70–110)
POCT GLUCOSE: 182 MG/DL (ref 70–110)
POCT GLUCOSE: 237 MG/DL (ref 70–110)
POCT GLUCOSE: 83 MG/DL (ref 70–110)
POTASSIUM SERPL-SCNC: 4.2 MMOL/L (ref 3.5–5.1)
RBC # BLD AUTO: 4.07 M/UL (ref 4.6–6.2)
SODIUM SERPL-SCNC: 137 MMOL/L (ref 136–145)
WBC # BLD AUTO: 10.1 K/UL (ref 3.9–12.7)

## 2023-04-10 PROCEDURE — 99232 SBSQ HOSP IP/OBS MODERATE 35: CPT | Mod: ,,, | Performed by: PHYSICIAN ASSISTANT

## 2023-04-10 PROCEDURE — 63600175 PHARM REV CODE 636 W HCPCS: Performed by: STUDENT IN AN ORGANIZED HEALTH CARE EDUCATION/TRAINING PROGRAM

## 2023-04-10 PROCEDURE — 99232 PR SUBSEQUENT HOSPITAL CARE,LEVL II: ICD-10-PCS | Mod: ,,, | Performed by: PHYSICIAN ASSISTANT

## 2023-04-10 PROCEDURE — 80048 BASIC METABOLIC PNL TOTAL CA: CPT | Performed by: STUDENT IN AN ORGANIZED HEALTH CARE EDUCATION/TRAINING PROGRAM

## 2023-04-10 PROCEDURE — 63600175 PHARM REV CODE 636 W HCPCS: Performed by: PHYSICIAN ASSISTANT

## 2023-04-10 PROCEDURE — 63600175 PHARM REV CODE 636 W HCPCS

## 2023-04-10 PROCEDURE — 25000003 PHARM REV CODE 250: Performed by: STUDENT IN AN ORGANIZED HEALTH CARE EDUCATION/TRAINING PROGRAM

## 2023-04-10 PROCEDURE — 20600001 HC STEP DOWN PRIVATE ROOM

## 2023-04-10 PROCEDURE — 86140 C-REACTIVE PROTEIN: CPT | Performed by: STUDENT IN AN ORGANIZED HEALTH CARE EDUCATION/TRAINING PROGRAM

## 2023-04-10 PROCEDURE — 85025 COMPLETE CBC W/AUTO DIFF WBC: CPT | Performed by: STUDENT IN AN ORGANIZED HEALTH CARE EDUCATION/TRAINING PROGRAM

## 2023-04-10 PROCEDURE — 36415 COLL VENOUS BLD VENIPUNCTURE: CPT | Performed by: STUDENT IN AN ORGANIZED HEALTH CARE EDUCATION/TRAINING PROGRAM

## 2023-04-10 PROCEDURE — 83735 ASSAY OF MAGNESIUM: CPT | Performed by: STUDENT IN AN ORGANIZED HEALTH CARE EDUCATION/TRAINING PROGRAM

## 2023-04-10 PROCEDURE — 84100 ASSAY OF PHOSPHORUS: CPT | Performed by: STUDENT IN AN ORGANIZED HEALTH CARE EDUCATION/TRAINING PROGRAM

## 2023-04-10 RX ORDER — DEXTROSE MONOHYDRATE AND SODIUM CHLORIDE 5; .9 G/100ML; G/100ML
INJECTION, SOLUTION INTRAVENOUS CONTINUOUS
Status: DISCONTINUED | OUTPATIENT
Start: 2023-04-10 | End: 2023-04-11 | Stop reason: HOSPADM

## 2023-04-10 RX ORDER — PROCHLORPERAZINE EDISYLATE 5 MG/ML
5 INJECTION INTRAMUSCULAR; INTRAVENOUS ONCE
Status: COMPLETED | OUTPATIENT
Start: 2023-04-10 | End: 2023-04-10

## 2023-04-10 RX ORDER — GLUCAGON 1 MG
1 KIT INJECTION
Status: DISCONTINUED | OUTPATIENT
Start: 2023-04-10 | End: 2023-04-11

## 2023-04-10 RX ORDER — HYDRALAZINE HYDROCHLORIDE 20 MG/ML
10 INJECTION INTRAMUSCULAR; INTRAVENOUS ONCE
Status: COMPLETED | OUTPATIENT
Start: 2023-04-11 | End: 2023-04-11

## 2023-04-10 RX ORDER — HYDRALAZINE HYDROCHLORIDE 20 MG/ML
10 INJECTION INTRAMUSCULAR; INTRAVENOUS ONCE
Status: COMPLETED | OUTPATIENT
Start: 2023-04-10 | End: 2023-04-10

## 2023-04-10 RX ORDER — INSULIN ASPART 100 [IU]/ML
1-10 INJECTION, SOLUTION INTRAVENOUS; SUBCUTANEOUS EVERY 6 HOURS PRN
Status: DISCONTINUED | OUTPATIENT
Start: 2023-04-10 | End: 2023-04-11

## 2023-04-10 RX ORDER — PANTOPRAZOLE SODIUM 40 MG/10ML
40 INJECTION, POWDER, LYOPHILIZED, FOR SOLUTION INTRAVENOUS DAILY
Status: DISCONTINUED | OUTPATIENT
Start: 2023-04-11 | End: 2023-04-11

## 2023-04-10 RX ORDER — LABETALOL HCL 20 MG/4 ML
20 SYRINGE (ML) INTRAVENOUS EVERY 6 HOURS PRN
Status: DISCONTINUED | OUTPATIENT
Start: 2023-04-10 | End: 2023-04-11 | Stop reason: HOSPADM

## 2023-04-10 RX ADMIN — INSULIN ASPART 2 UNITS: 100 INJECTION, SOLUTION INTRAVENOUS; SUBCUTANEOUS at 11:04

## 2023-04-10 RX ADMIN — IBUPROFEN 800 MG: 400 TABLET ORAL at 11:04

## 2023-04-10 RX ADMIN — ACETAMINOPHEN 1000 MG: 500 TABLET ORAL at 11:04

## 2023-04-10 RX ADMIN — HYDRALAZINE HYDROCHLORIDE 10 MG: 20 INJECTION INTRAMUSCULAR; INTRAVENOUS at 09:04

## 2023-04-10 RX ADMIN — PROCHLORPERAZINE EDISYLATE 5 MG: 5 INJECTION INTRAMUSCULAR; INTRAVENOUS at 06:04

## 2023-04-10 RX ADMIN — HYDRALAZINE HYDROCHLORIDE 10 MG: 20 INJECTION, SOLUTION INTRAMUSCULAR; INTRAVENOUS at 09:04

## 2023-04-10 RX ADMIN — ONDANSETRON 4 MG: 2 INJECTION INTRAMUSCULAR; INTRAVENOUS at 03:04

## 2023-04-10 RX ADMIN — PROCHLORPERAZINE EDISYLATE 5 MG: 5 INJECTION INTRAMUSCULAR; INTRAVENOUS at 09:04

## 2023-04-10 RX ADMIN — GABAPENTIN 300 MG: 300 CAPSULE ORAL at 08:04

## 2023-04-10 RX ADMIN — DEXTROSE AND SODIUM CHLORIDE: 5; 900 INJECTION, SOLUTION INTRAVENOUS at 03:04

## 2023-04-10 RX ADMIN — ENOXAPARIN SODIUM 40 MG: 40 INJECTION SUBCUTANEOUS at 04:04

## 2023-04-10 RX ADMIN — ONDANSETRON 4 MG: 2 INJECTION INTRAMUSCULAR; INTRAVENOUS at 04:04

## 2023-04-10 RX ADMIN — MUPIROCIN: 20 OINTMENT TOPICAL at 08:04

## 2023-04-10 RX ADMIN — ACETAMINOPHEN 1000 MG: 500 TABLET ORAL at 06:04

## 2023-04-10 RX ADMIN — IBUPROFEN 800 MG: 400 TABLET ORAL at 06:04

## 2023-04-10 NOTE — PROGRESS NOTES
"[facility]  Endocrinology  Progress Note    Admit Date: 4/6/2023     Reason for Consult: Management of T2DM, Hyperglycemia     Surgical Procedure and Date: LAR with diverting loop colostomy on 4/6.    Diabetes diagnosis year: >10 years ago    Home Diabetes Medications:  Levemir 25 units BID, Novolog 22 units with meals    How often checking glucose at home? One   BG readings on regimen: 100s-200s  Hypoglycemia on the regimen?  No  Missed doses on regimen?  No    Diabetes Complications include:     Hyperglycemia and Diabetic peripheral neuropathy     Complicating diabetes co morbidities:   Active Cancer and Glucocorticoid use       HPI:   Patient is a 67 y.o. 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   Pt s/p LAR with diverting loop colostomy on 4/6.  Endocrine consulted for BG management          Interval HPI:   No acute events overnight. Patient in room 1007/1007 A. Blood glucose stable. BG at and below goal on current insulin regimen (SSI, prandial, and basal insulin ). Steroid use- None .   4 Days Post-Op  Renal function- Normal   Vasopressors-  None     Diet NPO     Eating:   NPO  Nausea: No  Hypoglycemia and intervention: No  Fever: No  TPN and/or TF: No    BP (!) 169/76 (BP Location: Right arm)   Pulse 93   Temp 98.1 °F (36.7 °C) (Oral)   Resp 18   Ht 6' 1" (1.854 m)   Wt 111.1 kg (244 lb 14.9 oz)   SpO2 (!) 94%   BMI 32.31 kg/m²     Labs Reviewed and Include    Recent Labs   Lab 04/10/23  1001   *   CALCIUM 9.5      K 4.2   CO2 20*      BUN 18   CREATININE 0.8     Lab Results   Component Value Date    WBC 10.10 04/10/2023    HGB 12.8 (L) 04/10/2023    HCT 37.9 (L) 04/10/2023    MCV 93 04/10/2023     04/10/2023     No results for input(s): TSH, FREET4 in the last 168 hours.  Lab Results   Component Value Date    HGBA1C 9.3 (H) 03/28/2023       Nutritional status:   Body mass index is 32.31 kg/m².  Lab Results   Component " Value Date    ALBUMIN 3.7 03/28/2023    ALBUMIN 3.6 09/06/2022    ALBUMIN 3.6 08/29/2022     No results found for: PREALBUMIN    Estimated Creatinine Clearance: 113.9 mL/min (based on SCr of 0.8 mg/dL).    Accu-Checks  Recent Labs     04/09/23  0516 04/09/23  0805 04/09/23  1159 04/09/23  1631 04/09/23  1659 04/09/23  2001 04/10/23  0223 04/10/23  0738 04/10/23  1131 04/10/23  1510   POCTGLUCOSE 77 71 184* 66* 102 109 83 125* 182* 158*       Current Medications and/or Treatments Impacting Glycemic Control  Immunotherapy:    Immunosuppressants       None          Steroids:   Hormones (From admission, onward)      None          Pressors:    Autonomic Drugs (From admission, onward)      None          Hyperglycemia/Diabetes Medications:   Antihyperglycemics (From admission, onward)      Start     Stop Route Frequency Ordered    04/09/23 2100  insulin detemir U-100 (Levemir) pen 33 Units         -- SubQ Nightly 04/09/23 0722    04/09/23 0730  insulin aspart U-100 pen 7-15 Units         -- SubQ 3 times daily with meals 04/09/23 0723    04/07/23 1252  insulin aspart U-100 pen 1-10 Units         -- SubQ Before meals, nightly and at 0200 PRN 04/07/23 1153            ASSESSMENT and PLAN    Oncology  * Rectal cancer  Managed by primary team  S/p sx.      Endocrine  Uncontrolled type 2 diabetes mellitus with hyperglycemia  BG goal: 140-180   T2DM.  S/p LAR with diverting loop colostomy on 4/6.   BG at or below goal..  NPO now w/ n and vomiting.  Will further decrease his levemir and hold mealtime insulin.    -  Decrease Levemir  to 28 units daily   - Hold Novolog mealtime insulin as NPO  - Continue Mod dose correction ISF 25 starting at 150.  - POCT Glucose every 6 hours  - Hypoglycemia protocol in place      ** Please notify Endocrine for any change and/or advance in diet**  ** Please call Endocrine for any BG related issues **     Discharge Planning:   TBD. Please notify endocrinology prior to  discharge.              Marlon Dave PA-C  Endocrinology  [facility]

## 2023-04-10 NOTE — NURSING
Pt still nauseated despite additional dose of compazine. Zofran iv prn will be given next. Pt not eating breakfast r/t nausea and vomiting so holding off on any scheduled insulins. Hydralazine iv 10mg prn once given with pt pressure coming down to 152/78. Pt sitting up in chair with wife at bedside. Nauseated but no active vomiting. Team is aware. Call light in reach. Will continue to closely monitor.

## 2023-04-10 NOTE — ASSESSMENT & PLAN NOTE
KIRSTY Mason is an 67 y.o. male that is s/p LAR with diverting loop colostomy on 4/6. Doing well post-op.    - KUB this AM d/t emesis and distention   - Pain - tylenol, ibuprofen, gabapentin, oxy prn  - Diet - regular diet  - Nausea control w/ PRN antiemetics   - OOB, ambulate x3  - Encourage IS  - DVT ppx   - voiding.   -Stoma teaching on Monday    - Dispo: [facility]

## 2023-04-10 NOTE — PT/OT/SLP PROGRESS
Occupational Therapy      Patient Name:  KIRSTY Mason   MRN:  [id number]    Patient not seen today secondary to Patient ill (pt reporting nausea and vomiting this AM, politely declining therapy at this time). Will follow-up per POC.    4/10/2023

## 2023-04-10 NOTE — NURSING
"During rounds pt bp taken x3 and elevated each time. 189/94. Pt stated he was "nauseated and felt like he was gonna vomit again." Prn zofran and compazine given already by night shift at 0410 and 0636, pt had no relief with these. No pain, just incisional. Bowel sounds hypoactive but present in all 4 quadrants. Ostomy bag with dark green liquid noted to it. [facility] team paged to get additional orders for bp medication prn as well as something else for nausea. Still awaiting a call back. Pt wife at bedside, side rails up x2, call light in reach, brakes locked. Will continue to closely monitor.   "

## 2023-04-10 NOTE — PT/OT/SLP PROGRESS
Physical Therapy      Patient Name:  KIRSTY Mason   MRN:  [id number]    Patient not seen today secondary to patient refusal secondary to bouts of emesis and nausea . Will follow-up as per POC and as patient appropriate for PT services.

## 2023-04-10 NOTE — PROGRESS NOTES
"[facility]  Colorectal Surgery  Progress Note    Patient Name: KIRSTY Mason  MRN: [id number]  Admission Date: 4/6/2023  Hospital Length of Stay: 4 days  Attending Physician: MEGHAN Hong MD    Subjective:     Interval History: Voiding. Gas and stool in ostomy bag. Ambulated. Reporting some nausea and emesis after eating broccoli with orange juice, small amount light yellow emesis becoming more "grainy".     Post-Op Info:  Procedure(s) (LRB):  COLECTOMY, LAPAROSCOPIC LOWER ANTERIOR, SPLENIC PLEXOR MOBILIZATION  (N/A)  SIGMOIDOSCOPY, FLEXIBLE (N/A)  CREATION, ILEOSTOMY (Right)   4 Days Post-Op      Medications:  Continuous Infusions:  Scheduled Meds:   acetaminophen  1,000 mg Oral Q8H    cetirizine  10 mg Oral Daily    enoxaparin  40 mg Subcutaneous Daily    gabapentin  300 mg Oral TID    ibuprofen  800 mg Oral Q8H    insulin aspart U-100  7-15 Units Subcutaneous TIDWM    insulin detemir U-100  33 Units Subcutaneous QHS    mupirocin   Nasal BID     PRN Meds:   dextrose 10%    dextrose 10%    dextrose    dextrose    glucagon (human recombinant)    glucagon (human recombinant)    insulin aspart U-100    ondansetron    oxyCODONE    oxyCODONE    prochlorperazine    sodium chloride 0.9%    traMADoL        Objective:     Vital Signs (Most Recent):  Temp: 97.4 °F (36.3 °C) (04/10/23 0642)  Pulse: 86 (04/10/23 0417)  Resp: 18 (04/10/23 0417)  BP: (!) 184/85 (04/10/23 0417)  SpO2: (!) 93 % (04/10/23 0417)   Vital Signs (24h Range):  Temp:  [96.3 °F (35.7 °C)-99.1 °F (37.3 °C)] 97.4 °F (36.3 °C)  Pulse:  [75-86] 86  Resp:  [16-18] 18  SpO2:  [92 %-94 %] 93 %  BP: (147-184)/(67-85) 184/85     Intake/Output - Last 3 Shifts         04/08 0700  04/09 0659 04/09 0700  04/10 0659 04/10 0700 04/11 0659    P.O. 1200 960     Other       Total Intake(mL/kg) 1200 (10.8) 960 (8.6)     Urine (mL/kg/hr) 1000 (0.4) 150 (0.1)     Stool 300 500     Total Output 1300 650     Net -100 +310            Urine Occurrence  4 x "     Stool Occurrence 0 x 1 x             Physical Exam  Constitutional:       General: He is not in acute distress.     Appearance: Normal appearance. He is not ill-appearing.   HENT:      Head: Atraumatic.   Eyes:      Extraocular Movements: Extraocular movements intact.   Cardiovascular:      Rate and Rhythm: Normal rate.   Pulmonary:      Effort: Pulmonary effort is normal.   Abdominal:      General: Abdomen is flat.      Palpations: Abdomen is soft. Mild distention.     Comments: Gas and stool in ostomy bag  Incisions CDI   Musculoskeletal:         General: Normal range of motion.   Skin:     General: Skin is warm and dry.   Neurological:      General: No focal deficit present.   Psychiatric:         Mood and Affect: Mood normal.         Behavior: Behavior normal.       Significant Labs:  BMP (Last 3 Results):   Recent Labs   Lab 04/07/23  0331 04/08/23  0626 04/09/23  0519   * 142* 69*   * 135* 138   K 4.0 3.4* 3.4*    104 105   CO2 23 25 23   BUN 18 18 13   CREATININE 1.0 0.9 0.9   CALCIUM 8.2* 8.5* 8.6*   MG 1.7 2.2 2.0     CBC (Last 3 Results):   Recent Labs   Lab 04/07/23  0331 04/08/23  0626 04/09/23  0519   WBC 10.71 9.84 8.45   RBC 3.61* 3.33* 3.35*   HGB 11.6* 10.6* 10.6*   HCT 33.4* 31.8* 31.9*   * 143* 143*   MCV 93 96 95   MCH 32.1* 31.8* 31.6*   MCHC 34.7 33.3 33.2     CRP (Last 3 Results):   Recent Labs   Lab 04/09/23  0519   .8*       Significant Diagnostics:  None    Assessment/Plan:     * Rectal cancer  KIRSTY Mason is an 67 y.o. male that is s/p LAR with diverting loop colostomy on 4/6. Doing well post-op.    - KUB this AM d/t emesis and distention   - Pain - tylenol, ibuprofen, gabapentin, oxy prn  - Diet - regular diet  - Nausea control w/ PRN antiemetics   - OOB, ambulate x3  - Encourage IS  - DVT ppx   - voiding.   -Stoma teaching on Monday    - Dispo: JANETH Stephens MD  Colorectal Surgery  [facility]
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
## 2023-04-10 NOTE — ASSESSMENT & PLAN NOTE
BG goal: 140-180   T2DM.  S/p LAR with diverting loop colostomy on 4/6.   BG at or below goal..  NPO now w/ n and vomiting.  Will further decrease his levemir and hold mealtime insulin.    -  Decrease Levemir  to 28 units daily   - Hold Novolog mealtime insulin as NPO  - Continue Mod dose correction ISF 25 starting at 150.  - POCT Glucose every 6 hours  - Hypoglycemia protocol in place      ** Please notify Endocrine for any change and/or advance in diet**  ** Please call Endocrine for any BG related issues **     Discharge Planning:   TBD. Please notify endocrinology prior to discharge.

## 2023-04-10 NOTE — SUBJECTIVE & OBJECTIVE
"  Subjective:     Interval History: Voiding. Gas and stool in ostomy bag. Ambulated. Reporting some nausea and emesis after eating broccoli with orange juice, small amount light yellow emesis becoming more "grainy".     Post-Op Info:  Procedure(s) (LRB):  COLECTOMY, LAPAROSCOPIC LOWER ANTERIOR, SPLENIC PLEXOR MOBILIZATION  (N/A)  SIGMOIDOSCOPY, FLEXIBLE (N/A)  CREATION, ILEOSTOMY (Right)   4 Days Post-Op      Medications:  Continuous Infusions:  Scheduled Meds:   acetaminophen  1,000 mg Oral Q8H    cetirizine  10 mg Oral Daily    enoxaparin  40 mg Subcutaneous Daily    gabapentin  300 mg Oral TID    ibuprofen  800 mg Oral Q8H    insulin aspart U-100  7-15 Units Subcutaneous TIDWM    insulin detemir U-100  33 Units Subcutaneous QHS    mupirocin   Nasal BID     PRN Meds:   dextrose 10%    dextrose 10%    dextrose    dextrose    glucagon (human recombinant)    glucagon (human recombinant)    insulin aspart U-100    ondansetron    oxyCODONE    oxyCODONE    prochlorperazine    sodium chloride 0.9%    traMADoL        Objective:     Vital Signs (Most Recent):  Temp: 97.4 °F (36.3 °C) (04/10/23 0642)  Pulse: 86 (04/10/23 0417)  Resp: 18 (04/10/23 0417)  BP: (!) 184/85 (04/10/23 0417)  SpO2: (!) 93 % (04/10/23 0417)   Vital Signs (24h Range):  Temp:  [96.3 °F (35.7 °C)-99.1 °F (37.3 °C)] 97.4 °F (36.3 °C)  Pulse:  [75-86] 86  Resp:  [16-18] 18  SpO2:  [92 %-94 %] 93 %  BP: (147-184)/(67-85) 184/85     Intake/Output - Last 3 Shifts         04/08 0700  04/09 0659 04/09 0700  04/10 0659 04/10 0700  04/11 0659    P.O. 1200 960     Other       Total Intake(mL/kg) 1200 (10.8) 960 (8.6)     Urine (mL/kg/hr) 1000 (0.4) 150 (0.1)     Stool 300 500     Total Output 1300 650     Net -100 +310            Urine Occurrence  4 x     Stool Occurrence 0 x 1 x             Physical Exam  Constitutional:       General: He is not in acute distress.     Appearance: Normal appearance. He is not ill-appearing.   HENT:      Head: Atraumatic.   Eyes: "      Extraocular Movements: Extraocular movements intact.   Cardiovascular:      Rate and Rhythm: Normal rate.   Pulmonary:      Effort: Pulmonary effort is normal.   Abdominal:      General: Abdomen is flat.      Palpations: Abdomen is soft. Mild distention.     Comments: Gas and stool in ostomy bag  Incisions CDI   Musculoskeletal:         General: Normal range of motion.   Skin:     General: Skin is warm and dry.   Neurological:      General: No focal deficit present.   Psychiatric:         Mood and Affect: Mood normal.         Behavior: Behavior normal.       Significant Labs:  BMP (Last 3 Results):   Recent Labs   Lab 04/07/23  0331 04/08/23  0626 04/09/23  0519   * 142* 69*   * 135* 138   K 4.0 3.4* 3.4*    104 105   CO2 23 25 23   BUN 18 18 13   CREATININE 1.0 0.9 0.9   CALCIUM 8.2* 8.5* 8.6*   MG 1.7 2.2 2.0     CBC (Last 3 Results):   Recent Labs   Lab 04/07/23  0331 04/08/23  0626 04/09/23  0519   WBC 10.71 9.84 8.45   RBC 3.61* 3.33* 3.35*   HGB 11.6* 10.6* 10.6*   HCT 33.4* 31.8* 31.9*   * 143* 143*   MCV 93 96 95   MCH 32.1* 31.8* 31.6*   MCHC 34.7 33.3 33.2     CRP (Last 3 Results):   Recent Labs   Lab 04/09/23  0519   .8*       Significant Diagnostics:  None

## 2023-04-10 NOTE — PLAN OF CARE
[facility]  Initial Discharge Assessment       Primary Care Provider: Jass Mcdermott II, MD    Admission Diagnosis: Rectal cancer [C20]    Admission Date: 4/6/2023  Expected Discharge Date: 4/12/2023    Discharge Barriers Identified: None    Payor: [employer]ARE / Plan: [employer] for [employer] / Product Type: Medicare Advantage /     Extended Emergency Contact Information  Primary Emergency Contact: Maria Guadalupe Mason  Address: [address] [place] [address]  Home Phone: [phone]  Mobile Phone: [phone]  Relation: Spouse    Discharge Plan A: Home Health  Discharge Plan B: Home      [id number] - [facility] [address] [facility] [address]  [address] [address]  [facility] [address]  Phone: [phone] Fax: [phone]      Initial Assessment (most recent)       Adult Discharge Assessment - 04/10/23 1238          Discharge Assessment    Assessment Type Discharge Planning Assessment     Confirmed/corrected address, phone number and insurance Yes     Confirmed Demographics Correct on Facesheet     Source of Information patient     Communicated DA with patient/caregiver Yes     People in Home spouse     Do you expect to return to your current living situation? Yes     Do you have help at home or someone to help you manage your care at home? Yes     Prior to hospitilization cognitive status: Alert/Oriented     Current cognitive status: Alert/Oriented     Equipment Currently Used at Home none     Readmission within 30 days? No     Do you currently have service(s) that help you manage your care at home? No     Do you take prescription medications? Yes     Do you have prescription coverage? Yes     Do you have any problems affording any of your prescribed medications? No     Is the patient taking medications as prescribed? yes     Who is going to help you get home at discharge? Family     Are you on  dialysis? No     Do you take coumadin? No     Discharge Plan A Home Health     Discharge Plan B Home     DME Needed Upon Discharge  none     Discharge Plan discussed with: Patient     Discharge Barriers Identified None        Physical Activity    On average, how many days per week do you engage in moderate to strenuous exercise (like a brisk walk)? 0 days        Housing Stability    In the last 12 months, was there a time when you were not able to pay the mortgage or rent on time? No        Food Insecurity    Within the past 12 months, you worried that your food would run out before you got the money to buy more. Never true     Within the past 12 months, the food you bought just didn't last and you didn't have money to get more. Never true        Alcohol Use    Q1: How often do you have a drink containing alcohol? Never                      Minoo Salmeron RN, CM   Ext: [phone]

## 2023-04-10 NOTE — SUBJECTIVE & OBJECTIVE
"Interval HPI:   No acute events overnight. Patient in room 1007/1007 A. Blood glucose stable. BG at and below goal on current insulin regimen (SSI, prandial, and basal insulin ). Steroid use- None .   4 Days Post-Op  Renal function- Normal   Vasopressors-  None     Diet NPO     Eating:   NPO  Nausea: No  Hypoglycemia and intervention: No  Fever: No  TPN and/or TF: No    BP (!) 169/76 (BP Location: Right arm)   Pulse 93   Temp 98.1 °F (36.7 °C) (Oral)   Resp 18   Ht 6' 1" (1.854 m)   Wt 111.1 kg (244 lb 14.9 oz)   SpO2 (!) 94%   BMI 32.31 kg/m²     Labs Reviewed and Include    Recent Labs   Lab 04/10/23  1001   *   CALCIUM 9.5      K 4.2   CO2 20*      BUN 18   CREATININE 0.8     Lab Results   Component Value Date    WBC 10.10 04/10/2023    HGB 12.8 (L) 04/10/2023    HCT 37.9 (L) 04/10/2023    MCV 93 04/10/2023     04/10/2023     No results for input(s): TSH, FREET4 in the last 168 hours.  Lab Results   Component Value Date    HGBA1C 9.3 (H) 03/28/2023       Nutritional status:   Body mass index is 32.31 kg/m².  Lab Results   Component Value Date    ALBUMIN 3.7 03/28/2023    ALBUMIN 3.6 09/06/2022    ALBUMIN 3.6 08/29/2022     No results found for: PREALBUMIN    Estimated Creatinine Clearance: 113.9 mL/min (based on SCr of 0.8 mg/dL).    Accu-Checks  Recent Labs     04/09/23  0516 04/09/23  0805 04/09/23  1159 04/09/23  1631 04/09/23  1659 04/09/23  2001 04/10/23  0223 04/10/23  0738 04/10/23  1131 04/10/23  1510   POCTGLUCOSE 77 71 184* 66* 102 109 83 125* 182* 158*       Current Medications and/or Treatments Impacting Glycemic Control  Immunotherapy:    Immunosuppressants       None          Steroids:   Hormones (From admission, onward)      None          Pressors:    Autonomic Drugs (From admission, onward)      None          Hyperglycemia/Diabetes Medications:   Antihyperglycemics (From admission, onward)      Start     Stop Route Frequency Ordered    04/09/23 2100  insulin " detemir U-100 (Levemir) pen 33 Units         -- SubQ Nightly 04/09/23 0722    04/09/23 0730  insulin aspart U-100 pen 7-15 Units         -- SubQ 3 times daily with meals 04/09/23 0723    04/07/23 1252  insulin aspart U-100 pen 1-10 Units         -- SubQ Before meals, nightly and at 0200 PRN 04/07/23 1153

## 2023-04-10 NOTE — PLAN OF CARE
POC reviewed and discussed with pt; pt A/Ox4; VSS; pt ambulating; abdominal incisions with dermabond-C/D/I; RUQ ileostomy I/P/D; voiding per BR or urinal; Accuchecks ACHS + 2am; denies pain; awaiting ileostomy teaching per WOCN; bed locked in lowest position; will continue to monitor.      Problem: Adult Inpatient Plan of Care  Goal: Plan of Care Review  Outcome: Ongoing, Progressing  Goal: Patient-Specific Goal (Individualized)  Outcome: Ongoing, Progressing  Goal: Absence of Hospital-Acquired Illness or Injury  Outcome: Ongoing, Progressing  Goal: Optimal Comfort and Wellbeing  Outcome: Ongoing, Progressing  Goal: Readiness for Transition of Care  Outcome: Ongoing, Progressing     Problem: Diabetes Comorbidity  Goal: Blood Glucose Level Within Targeted Range  Outcome: Ongoing, Progressing     Problem: Infection  Goal: Absence of Infection Signs and Symptoms  Outcome: Ongoing, Progressing     Problem: Fall Injury Risk  Goal: Absence of Fall and Fall-Related Injury  Outcome: Ongoing, Progressing

## 2023-04-11 VITALS
SYSTOLIC BLOOD PRESSURE: 144 MMHG | DIASTOLIC BLOOD PRESSURE: 65 MMHG | HEART RATE: 83 BPM | RESPIRATION RATE: 16 BRPM | OXYGEN SATURATION: 96 % | TEMPERATURE: 98 F | HEIGHT: 73 IN | WEIGHT: 244.94 LBS | BODY MASS INDEX: 32.46 KG/M2

## 2023-04-11 LAB
ANION GAP SERPL CALC-SCNC: 7 MMOL/L (ref 8–16)
BUN SERPL-MCNC: 20 MG/DL (ref 8–23)
CALCIUM SERPL-MCNC: 8.6 MG/DL (ref 8.7–10.5)
CHLORIDE SERPL-SCNC: 104 MMOL/L (ref 95–110)
CO2 SERPL-SCNC: 25 MMOL/L (ref 23–29)
CREAT SERPL-MCNC: 0.9 MG/DL (ref 0.5–1.4)
CRP SERPL-MCNC: 74.1 MG/L (ref 0–8.2)
EST. GFR  (NO RACE VARIABLE): >60 ML/MIN/1.73 M^2
GLUCOSE SERPL-MCNC: 247 MG/DL (ref 70–110)
POCT GLUCOSE: 209 MG/DL (ref 70–110)
POCT GLUCOSE: 248 MG/DL (ref 70–110)
POCT GLUCOSE: 257 MG/DL (ref 70–110)
POTASSIUM SERPL-SCNC: 4.3 MMOL/L (ref 3.5–5.1)
SODIUM SERPL-SCNC: 136 MMOL/L (ref 136–145)

## 2023-04-11 PROCEDURE — 63600175 PHARM REV CODE 636 W HCPCS: Performed by: STUDENT IN AN ORGANIZED HEALTH CARE EDUCATION/TRAINING PROGRAM

## 2023-04-11 PROCEDURE — 99232 SBSQ HOSP IP/OBS MODERATE 35: CPT | Mod: ,,, | Performed by: NURSE PRACTITIONER

## 2023-04-11 PROCEDURE — 25000003 PHARM REV CODE 250: Performed by: NURSE PRACTITIONER

## 2023-04-11 PROCEDURE — 97530 THERAPEUTIC ACTIVITIES: CPT | Mod: CQ

## 2023-04-11 PROCEDURE — 63600175 PHARM REV CODE 636 W HCPCS: Performed by: NURSE PRACTITIONER

## 2023-04-11 PROCEDURE — 36415 COLL VENOUS BLD VENIPUNCTURE: CPT | Performed by: STUDENT IN AN ORGANIZED HEALTH CARE EDUCATION/TRAINING PROGRAM

## 2023-04-11 PROCEDURE — 63600175 PHARM REV CODE 636 W HCPCS: Performed by: COLON & RECTAL SURGERY

## 2023-04-11 PROCEDURE — 99232 PR SUBSEQUENT HOSPITAL CARE,LEVL II: ICD-10-PCS | Mod: ,,, | Performed by: NURSE PRACTITIONER

## 2023-04-11 PROCEDURE — C9113 INJ PANTOPRAZOLE SODIUM, VIA: HCPCS | Performed by: COLON & RECTAL SURGERY

## 2023-04-11 PROCEDURE — 97535 SELF CARE MNGMENT TRAINING: CPT

## 2023-04-11 PROCEDURE — 97116 GAIT TRAINING THERAPY: CPT | Mod: CQ

## 2023-04-11 PROCEDURE — 25000003 PHARM REV CODE 250: Performed by: STUDENT IN AN ORGANIZED HEALTH CARE EDUCATION/TRAINING PROGRAM

## 2023-04-11 PROCEDURE — 63600175 PHARM REV CODE 636 W HCPCS

## 2023-04-11 PROCEDURE — 86140 C-REACTIVE PROTEIN: CPT | Performed by: STUDENT IN AN ORGANIZED HEALTH CARE EDUCATION/TRAINING PROGRAM

## 2023-04-11 PROCEDURE — 80048 BASIC METABOLIC PNL TOTAL CA: CPT | Performed by: STUDENT IN AN ORGANIZED HEALTH CARE EDUCATION/TRAINING PROGRAM

## 2023-04-11 PROCEDURE — 99900035 HC TECH TIME PER 15 MIN (STAT)

## 2023-04-11 RX ORDER — INSULIN ASPART 100 [IU]/ML
3-6 INJECTION, SOLUTION INTRAVENOUS; SUBCUTANEOUS
Status: DISCONTINUED | OUTPATIENT
Start: 2023-04-11 | End: 2023-04-11 | Stop reason: HOSPADM

## 2023-04-11 RX ORDER — INSULIN ASPART 100 [IU]/ML
1-10 INJECTION, SOLUTION INTRAVENOUS; SUBCUTANEOUS EVERY 4 HOURS PRN
Status: DISCONTINUED | OUTPATIENT
Start: 2023-04-11 | End: 2023-04-11

## 2023-04-11 RX ORDER — OXYCODONE HYDROCHLORIDE 5 MG/1
5 TABLET ORAL EVERY 4 HOURS PRN
Qty: 14 TABLET | Refills: 0 | Status: SHIPPED | OUTPATIENT
Start: 2023-04-11 | End: 2023-06-12

## 2023-04-11 RX ORDER — INSULIN ASPART 100 [IU]/ML
1-10 INJECTION, SOLUTION INTRAVENOUS; SUBCUTANEOUS
Status: DISCONTINUED | OUTPATIENT
Start: 2023-04-11 | End: 2023-04-11 | Stop reason: HOSPADM

## 2023-04-11 RX ORDER — PANTOPRAZOLE SODIUM 40 MG/10ML
40 INJECTION, POWDER, LYOPHILIZED, FOR SOLUTION INTRAVENOUS DAILY
Status: DISCONTINUED | OUTPATIENT
Start: 2023-04-11 | End: 2023-04-11 | Stop reason: HOSPADM

## 2023-04-11 RX ADMIN — INSULIN ASPART 6 UNITS: 100 INJECTION, SOLUTION INTRAVENOUS; SUBCUTANEOUS at 12:04

## 2023-04-11 RX ADMIN — PANTOPRAZOLE SODIUM 40 MG: 40 INJECTION, POWDER, FOR SOLUTION INTRAVENOUS at 12:04

## 2023-04-11 RX ADMIN — ACETAMINOPHEN 1000 MG: 500 TABLET ORAL at 05:04

## 2023-04-11 RX ADMIN — MUPIROCIN: 20 OINTMENT TOPICAL at 08:04

## 2023-04-11 RX ADMIN — DEXTROSE AND SODIUM CHLORIDE: 5; 900 INJECTION, SOLUTION INTRAVENOUS at 05:04

## 2023-04-11 RX ADMIN — GABAPENTIN 300 MG: 300 CAPSULE ORAL at 08:04

## 2023-04-11 RX ADMIN — INSULIN ASPART 4 UNITS: 100 INJECTION, SOLUTION INTRAVENOUS; SUBCUTANEOUS at 06:04

## 2023-04-11 RX ADMIN — PANTOPRAZOLE SODIUM 40 MG: 40 INJECTION, POWDER, FOR SOLUTION INTRAVENOUS at 08:04

## 2023-04-11 RX ADMIN — HYDRALAZINE HYDROCHLORIDE 10 MG: 20 INJECTION INTRAMUSCULAR; INTRAVENOUS at 12:04

## 2023-04-11 RX ADMIN — CETIRIZINE HYDROCHLORIDE 10 MG: 10 TABLET, FILM COATED ORAL at 08:04

## 2023-04-11 RX ADMIN — IBUPROFEN 800 MG: 400 TABLET ORAL at 05:04

## 2023-04-11 RX ADMIN — HYDRALAZINE HYDROCHLORIDE 10 MG: 20 INJECTION, SOLUTION INTRAMUSCULAR; INTRAVENOUS at 12:04

## 2023-04-11 RX ADMIN — INSULIN ASPART 4 UNITS: 100 INJECTION, SOLUTION INTRAVENOUS; SUBCUTANEOUS at 05:04

## 2023-04-11 RX ADMIN — INSULIN DETEMIR 16 UNITS: 100 INJECTION, SOLUTION SUBCUTANEOUS at 10:04

## 2023-04-11 NOTE — ASSESSMENT & PLAN NOTE
KRISTY Mason is an 67 y.o. male that is s/p LAR with diverting loop colostomy on 4/6. Doing well post-op.     - restart CLD + Boosts, may advance to regular diet in PM  - Pain - tylenol, ibuprofen, gabapentin, oxy prn  - Diet - regular diet  - Nausea control w/ PRN antiemetics   - OOB, ambulate x3  - Encourage IS  - DVT ppx   - voiding.   - Stoma teaching on Monday    - Dispo: JANETH, potential discharge home after tolerating dinner

## 2023-04-11 NOTE — PLAN OF CARE
CM sent clinicals to [facility]     Update: [facility] patient, SOC Thursday 4/13 04/11/23 1012   Post-Acute Status   Post-Acute Authorization Home Health   Home Health Status Referrals Sent     Minoo Salmeron RN, CM   Ext: [phone]

## 2023-04-11 NOTE — PROGRESS NOTES
[facility]  Colorectal Surgery  Progress Note    Patient Name: KIRSTY Mason  MRN: [id number]  Admission Date: 4/6/2023  Hospital Length of Stay: 5 days  Attending Physician: MEGHAN Hong MD    Subjective:     Interval History: NAEON. Reports feeling much better, no longer experiencing nausea or vomiting. Ostomy with good output. Abdominal pain well controlled.    Post-Op Info:  Procedure(s) (LRB):  COLECTOMY, LAPAROSCOPIC LOWER ANTERIOR, SPLENIC PLEXOR MOBILIZATION  (N/A)  SIGMOIDOSCOPY, FLEXIBLE (N/A)  CREATION, ILEOSTOMY (Right)   5 Days Post-Op      Medications:  Continuous Infusions:   dextrose 5 % and 0.9 % NaCl 75 mL/hr at 04/11/23 0511     Scheduled Meds:   acetaminophen  1,000 mg Oral Q8H    cetirizine  10 mg Oral Daily    enoxaparin  40 mg Subcutaneous Daily    gabapentin  300 mg Oral TID    ibuprofen  800 mg Oral Q8H    insulin detemir U-100  28 Units Subcutaneous QHS    mupirocin   Nasal BID    pantoprazole  40 mg Intravenous Daily     PRN Meds:   dextrose 10%    dextrose 10%    dextrose 10%    dextrose 10%    dextrose    dextrose    glucagon (human recombinant)    insulin aspart U-100    labetalol    ondansetron    oxyCODONE    oxyCODONE    prochlorperazine    sodium chloride 0.9%    traMADoL        Objective:     Vital Signs (Most Recent):  Temp: 97.6 °F (36.4 °C) (04/11/23 0759)  Pulse: 83 (04/11/23 0759)  Resp: 20 (04/11/23 0759)  BP: 137/63 (04/11/23 0759)  SpO2: 95 % (04/11/23 0759) Vital Signs (24h Range):  Temp:  [97.6 °F (36.4 °C)-98.7 °F (37.1 °C)] 97.6 °F (36.4 °C)  Pulse:  [83-93] 83  Resp:  [18-20] 20  SpO2:  [93 %-95 %] 95 %  BP: (137-174)/(63-84) 137/63     Intake/Output - Last 3 Shifts         04/09 0700  04/10 0659 04/10 0700 04/11 0659 04/11 0700  04/12 0659    P.O. 960      Total Intake(mL/kg) 960 (8.6)      Urine (mL/kg/hr) 150 (0.1) 175 (0.1)     Stool 500 100 300    Total Output 650 275 300    Net +310 -275 -300           Urine Occurrence 4 x      Stool  Occurrence 1 x              Physical Exam  Constitutional:       General: He is not in acute distress.     Appearance: Normal appearance. He is not ill-appearing.   HENT:      Head: Atraumatic.   Eyes:      Extraocular Movements: Extraocular movements intact.   Cardiovascular:      Rate and Rhythm: Normal rate.   Pulmonary:      Effort: Pulmonary effort is normal.   Abdominal:      General: Abdomen is flat.      Palpations: Abdomen is soft. distention improved from yesterday.     Comments: Gas and stool in ostomy bag  Incisions CDI   Musculoskeletal:         General: Normal range of motion.   Skin:     General: Skin is warm and dry.   Neurological:      General: No focal deficit present.   Psychiatric:         Mood and Affect: Mood normal.         Behavior: Behavior normal.      Significant Labs:  BMP (Last 3 Results):   Recent Labs   Lab 04/08/23  0626 04/09/23  0519 04/10/23  1001   * 69* 175*   * 138 137   K 3.4* 3.4* 4.2    105 102   CO2 25 23 20*   BUN 18 13 18   CREATININE 0.9 0.9 0.8   CALCIUM 8.5* 8.6* 9.5   MG 2.2 2.0 2.0     CBC (Last 3 Results):   Recent Labs   Lab 04/08/23  0626 04/09/23  0519 04/10/23  1001   WBC 9.84 8.45 10.10   RBC 3.33* 3.35* 4.07*   HGB 10.6* 10.6* 12.8*   HCT 31.8* 31.9* 37.9*   * 143* 233   MCV 96 95 93   MCH 31.8* 31.6* 31.4*   MCHC 33.3 33.2 33.8       Significant Diagnostics:  None    Assessment/Plan:     * Rectal cancer  KIRSTY Mason is an 67 y.o. male that is s/p LAR with diverting loop colostomy on 4/6. Doing well post-op.     - restart CLD + Boosts, may advance to regular diet in PM  - Pain - tylenol, ibuprofen, gabapentin, oxy prn  - Diet - regular diet  - Nausea control w/ PRN antiemetics   - OOB, ambulate x3  - Encourage IS  - DVT ppx   - voiding.   - Stoma teaching on Monday    - Dispo: JANETH, potential discharge home after tolerating dinner            Lilile Stephens MD  Colorectal Surgery  [facility]

## 2023-04-11 NOTE — PROGRESS NOTES
"[facility]  Endocrinology  Progress Note    Admit Date: 2023     Reason for Consult: Management of T2DM, Hyperglycemia     Surgical Procedure and Date: LAR with diverting loop colostomy on .    Diabetes diagnosis year: >10 years ago    Home Diabetes Medications:  Levemir 25 units BID, Novolog 22 units with meals    How often checking glucose at home? One   BG readings on regimen: 100s-200s  Hypoglycemia on the regimen?  No  Missed doses on regimen?  No    Diabetes Complications include:     Hyperglycemia and Diabetic peripheral neuropathy     Complicating diabetes co morbidities:   Active Cancer and Glucocorticoid use       HPI:   Patient is a 67 y.o. 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   Pt s/p LAR with diverting loop colostomy on .  Endocrine consulted for BG management          Interval HPI:   Overnight events: No acute events overnight. Patient in room 1007/1007 A. Blood glucose improving. BG at and above goal on current insulin regimen (SSI + basal insulin). Steroid use- None. 5 Days Post-Op  Renal function- Normal   Vasopressors-  None       Endocrine will continue to follow and manage insulin orders inpatient.         Diet Adult Regular (IDDSI Level 7)     Eatin%  Nausea: No  Hypoglycemia and intervention: No  Fever: No  TPN and/or TF: No      /62 (BP Location: Right arm, Patient Position: Lying)   Pulse 75   Temp 97.6 °F (36.4 °C) (Oral)   Resp 20   Ht 6' 1" (1.854 m)   Wt 111.1 kg (244 lb 14.9 oz)   SpO2 (!) 93%   BMI 32.31 kg/m²     Labs Reviewed and Include    Recent Labs   Lab 23  0741   *   CALCIUM 8.6*      K 4.3   CO2 25      BUN 20   CREATININE 0.9     Lab Results   Component Value Date    WBC 10.10 04/10/2023    HGB 12.8 (L) 04/10/2023    HCT 37.9 (L) 04/10/2023    MCV 93 04/10/2023     04/10/2023     No results for input(s): TSH, FREET4 in the last 168 hours.  Lab Results "   Component Value Date    HGBA1C 9.3 (H) 03/28/2023       Nutritional status:   Body mass index is 32.31 kg/m².  Lab Results   Component Value Date    ALBUMIN 3.7 03/28/2023    ALBUMIN 3.6 09/06/2022    ALBUMIN 3.6 08/29/2022     No results found for: PREALBUMIN    Estimated Creatinine Clearance: 101.2 mL/min (based on SCr of 0.9 mg/dL).    Accu-Checks  Recent Labs     04/09/23  1631 04/09/23  1659 04/09/23  2001 04/10/23  0223 04/10/23  0738 04/10/23  1131 04/10/23  1510 04/10/23  2340 04/11/23  0500 04/11/23  1202   POCTGLUCOSE 66* 102 109 83 125* 182* 158* 237* 248* 257*       Current Medications and/or Treatments Impacting Glycemic Control  Immunotherapy:    Immunosuppressants       None          Steroids:   Hormones (From admission, onward)      None          Pressors:    Autonomic Drugs (From admission, onward)      None          Hyperglycemia/Diabetes Medications:   Antihyperglycemics (From admission, onward)      Start     Stop Route Frequency Ordered    04/11/23 1015  insulin aspart U-100 pen 1-10 Units         -- SubQ Every 4 hours PRN 04/11/23 0914    04/11/23 0913  insulin detemir U-100 (Levemir) pen 16 Units         -- SubQ Daily 04/11/23 0913            ASSESSMENT and PLAN    Cardiac/Vascular  Dyslipidemia  Recommend statin therapy at discharge,       Oncology  * Rectal cancer  Managed by primary team  S/p sx.      Endocrine  Uncontrolled type 2 diabetes mellitus with hyperglycemia  Endocrinology consulted for BG management.   BG goal 140-180    - Levemir (Insulin Detemir) 16 units daily (WBD 0.5 units/kg/day started due to basal being held overnight)  - Novolog (Insulin Aspart) 3-6 units TIDWM and prn for BG excursions [facility] SSI (150/25)  - BG checks AC/HS/0200  - Hypoglycemia protocol in place  - If blood glucose greater than 300, please ask patient not to eat food or drink anything other than water until correctional insulin has brought it back below 250    ** Please notify Endocrine for any change  and/or advance in diet**  ** Please call Endocrine for any BG related issues **    Discharge Planning:   Due to reduced appetite recommend a 50% reduction in insulin.   - Levemir 12 units BID  - Lispro 10 units with meals + SSI  - Lispro SSI    150 - 200 + 2 unit    201 - 250 + 4 units    251 - 300 + 6 units    301 - 350 + 8 units       > 350   + 10 units  - Send logs in 4 days for review  - Has follow-up with Denise Poole DNP, FNP          Other  MELY (obstructive sleep apnea)  May affect BG readings if uncontrolled             Edgar Berger, DNP, FNP  Endocrinology  [facility]

## 2023-04-11 NOTE — SUBJECTIVE & OBJECTIVE
"Interval HPI:   Overnight events: No acute events overnight. Patient in room 1007/1007 A. Blood glucose improving. BG at and above goal on current insulin regimen (SSI + basal insulin). Steroid use- None. 5 Days Post-Op  Renal function- Normal   Vasopressors-  None       Endocrine will continue to follow and manage insulin orders inpatient.         Diet Adult Regular (IDDSI Level 7)     Eatin%  Nausea: No  Hypoglycemia and intervention: No  Fever: No  TPN and/or TF: No      /62 (BP Location: Right arm, Patient Position: Lying)   Pulse 75   Temp 97.6 °F (36.4 °C) (Oral)   Resp 20   Ht 6' 1" (1.854 m)   Wt 111.1 kg (244 lb 14.9 oz)   SpO2 (!) 93%   BMI 32.31 kg/m²     Labs Reviewed and Include    Recent Labs   Lab 23  0741   *   CALCIUM 8.6*      K 4.3   CO2 25      BUN 20   CREATININE 0.9     Lab Results   Component Value Date    WBC 10.10 04/10/2023    HGB 12.8 (L) 04/10/2023    HCT 37.9 (L) 04/10/2023    MCV 93 04/10/2023     04/10/2023     No results for input(s): TSH, FREET4 in the last 168 hours.  Lab Results   Component Value Date    HGBA1C 9.3 (H) 2023       Nutritional status:   Body mass index is 32.31 kg/m².  Lab Results   Component Value Date    ALBUMIN 3.7 2023    ALBUMIN 3.6 2022    ALBUMIN 3.6 2022     No results found for: PREALBUMIN    Estimated Creatinine Clearance: 101.2 mL/min (based on SCr of 0.9 mg/dL).    Accu-Checks  Recent Labs     23  1631 23  1659 23  2001 04/10/23  0223 04/10/23  0738 04/10/23  1131 04/10/23  1510 04/10/23  2340 23  0500 23  1202   POCTGLUCOSE 66* 102 109 83 125* 182* 158* 237* 248* 257*       Current Medications and/or Treatments Impacting Glycemic Control  Immunotherapy:    Immunosuppressants       None          Steroids:   Hormones (From admission, onward)      None          Pressors:    Autonomic Drugs (From admission, onward)      None      "     Hyperglycemia/Diabetes Medications:   Antihyperglycemics (From admission, onward)      Start     Stop Route Frequency Ordered    04/11/23 1015  insulin aspart U-100 pen 1-10 Units         -- SubQ Every 4 hours PRN 04/11/23 0914    04/11/23 0913  insulin detemir U-100 (Levemir) pen 16 Units         -- SubQ Daily 04/11/23 0913

## 2023-04-11 NOTE — PLAN OF CARE
[facility]      HOME HEALTH ORDERS  FACE TO FACE ENCOUNTER    Patient Name: KIRSTY Mason  YOB: 1955    PCP: Jass Mcdermott II, MD   PCP Address: [address] [place], [place] [address]  [place] / [place] [address]  PCP Phone Number: [phone]  PCP Fax: [phone]    Encounter Date: 3/8/23    Admit to Home Health    Diagnoses:  Active Hospital Problems    Diagnosis  POA    *Rectal cancer [C20]  Yes    Uncontrolled type 2 diabetes mellitus with hyperglycemia [E11.65]  Yes      Resolved Hospital Problems   No resolved problems to display.       Follow Up Appointments:  Future Appointments   Date Time Provider Department Center   5/23/2023 10:00 AM Denise Poole DNP, NP [facility] ENDOCRN [place]       Allergies:  Review of patient's allergies indicates:   Allergen Reactions    Influenza vaccine tr-s 11 (pf)      Other reaction(s): always get sick per pt       Medications: Review discharge medications with patient and family and provide education.    Current Facility-Administered Medications   Medication Dose Route Frequency Provider Last Rate Last Admin    acetaminophen tablet 1,000 mg  1,000 mg Oral Q8H Kami Hoffman MD   1,000 mg at 04/11/23 0502    cetirizine tablet 10 mg  10 mg Oral Daily Kami Hoffman MD   10 mg at 04/11/23 0835    dextrose 10% bolus 125 mL 125 mL  12.5 g Intravenous PRN BECKA Gardiner-BRAYAN        dextrose 10% bolus 125 mL 125 mL  12.5 g Intravenous PRN BECKA Gardiner-BRAYAN        dextrose 10% bolus 250 mL 250 mL  25 g Intravenous PRN BECKA Gardiner-BRAYAN        dextrose 10% bolus 250 mL 250 mL  25 g Intravenous PRN Marlon Dave PA-C        dextrose 40 % gel 15,000 mg  15 g Oral PRN BECKA Gardiner-C   15,000 mg at 04/09/23 1637    dextrose 40 % gel 30,000 mg  30 g Oral PRN Marlon Dave PA-C        dextrose 5 % and 0.9 % NaCl infusion   Intravenous Continuous Rik Rojo MD 75 mL/hr at 04/11/23  0511 New Bag at 04/11/23 0511    enoxaparin injection 40 mg  40 mg Subcutaneous Daily Lisa Carlson MD   40 mg at 04/10/23 1600    gabapentin capsule 300 mg  300 mg Oral TID Kami Hoffman MD   300 mg at 04/11/23 0835    glucagon (human recombinant) injection 1 mg  1 mg Intramuscular PRN Marlon Dave PA-C        ibuprofen tablet 800 mg  800 mg Oral Q8H Kami Hoffman MD   800 mg at 04/11/23 0501    insulin aspart U-100 pen 1-10 Units  1-10 Units Subcutaneous Q4H PRN Edgar Berger DNP, FNP        insulin detemir U-100 (Levemir) pen 16 Units  16 Units Subcutaneous Daily Edgar Berger DNP, RHEA        labetalol 20 mg/4 mL (5 mg/mL) IV syring  20 mg Intravenous Q6H PRN Kami Hoffman MD        mupirocin 2 % ointment   Nasal BID Kami Hoffman MD   Given at 04/11/23 0836    ondansetron injection 4 mg  4 mg Intravenous Q6H PRN Kami Hoffman MD   4 mg at 04/10/23 1552    oxyCODONE immediate release tablet 5 mg  5 mg Oral Q4H PRN Kami Hoffman MD   5 mg at 04/06/23 2236    oxyCODONE immediate release tablet Tab 10 mg  10 mg Oral Q4H PRN Kami Hoffman MD        pantoprazole injection 40 mg  40 mg Intravenous Daily H. [place] Hong MD   40 mg at 04/11/23 0835    prochlorperazine injection Soln 5 mg  5 mg Intravenous Q6H PRN Kaim Hoffman MD   5 mg at 04/10/23 0636    sodium chloride 0.9% flush 10 mL  10 mL Intra-Catheter PRN Kami Hoffman MD        traMADoL tablet 50 mg  50 mg Oral Q4H PRN Kami Hoffman MD         Current Discharge Medication List        CONTINUE these medications which have NOT CHANGED    Details   b complex vitamins tablet Take 1 tablet by mouth once daily.      !! blood sugar diagnostic Strp 1 strip by Misc.(Non-Drug; Combo Route) route every meal as needed (with meals and PRN).  Qty: 300 strip, Refills: 3    Comments: ascensia contour test strips  Associated Diagnoses:  "Uncontrolled type 2 diabetes mellitus with hyperglycemia      blood-glucose meter (BLOOD GLUCOSE MONITORING) kit Use as instructed  Qty: 1 each, Refills: 0      cetirizine (ZYRTEC) 10 MG tablet Take 10 mg by mouth once daily.      cinnamon bark 500 mg capsule Take 500 mg by mouth once daily.      furosemide (LASIX) 20 MG tablet TAKE 1 TO 2 TABLETS BY MOUTH ONCE DAILY AS NEEDED FOR  SWELLING  Qty: 60 tablet, Refills: 6      insulin lispro 100 unit/mL injection Inject 22 Units into the skin 2 (two) times a day. With meals    Does not take with evening meal      LEVEMIR FLEXTOUCH U-100 INSULN 100 unit/mL (3 mL) InPn pen ADMINISTER 25 UNITS UNDER THE SKIN EVERY DAY  Qty: 24 mL, Refills: 3      metroNIDAZOLE (FLAGYL) 500 MG tablet Take 1 tablet (500 mg total) by mouth 2 (two) times daily.  Qty: 2 tablet, Refills: 0      MULTIVIT-MINERALS/HERBAL 121 (URINOZINC PROSTATE FORMULA ORAL) Take 1 tablet by mouth 2 (two) times daily.      pantoprazole (PROTONIX) 40 MG tablet Take 1 tablet (40 mg total) by mouth before breakfast.  Qty: 90 tablet, Refills: 3      pen needle, diabetic 32 gauge x 3/16" Ndle Use for daily insulin injections  Qty: 100 each, Refills: 3      UNABLE TO FIND Prevagen take 1 tablet daily      VIT C/VIT E ACETATE/LUTEIN/MIN (OCUVITE LUTEIN ORAL) Take 1 tablet by mouth once daily.      aspirin (ECOTRIN) 81 MG EC tablet Take 1 tablet by mouth once daily. On hold per pt possible surgery      !! blood sugar diagnostic Strp 1 strip by Misc.(Non-Drug; Combo Route) route 3 (three) times daily before meals.  Qty: 300 strip, Refills: 3      fluticasone (FLONASE) 50 mcg/actuation nasal spray INHALE ONE SPRAY(S) IN EACH NOSTRIL ONCE DAILY AS NEEDED  Qty: 1 Bottle, Refills: 6    Comments: Please consider 90 day supplies to promote better adherence      naproxen (NAPROSYN) 500 MG tablet Take 500 mg by mouth 2 (two) times daily with meals.      PROAIR HFA 90 mcg/actuation inhaler INHALE 2 PUFFS BY MOUTH EVERY 6 HOURS AS " NEEDED FOR WHEEZING RESCUE  Qty: 9 g, Refills: 0       !! - Potential duplicate medications found. Please discuss with provider.            I have seen and examined this patient within the last 30 days. My clinical findings that support the need for the home health skilled services and home bound status are the following:no   Weakness/numbness causing balance and gait disturbance due to Surgery making it taxing to leave home.     Diet:   regular diet    Labs:  juan    Referrals/ Consults  Physical Therapy to evaluate and treat. Evaluate for home safety and equipment needs; Establish/upgrade home exercise program. Perform / instruct on therapeutic exercises, gait training, transfer training, and Range of Motion.  Occupational Therapy to evaluate and treat. Evaluate home environment for safety and equipment needs. Perform/Instruct on transfers, ADL training, ROM, and therapeutic exercises.   to evaluate for community resources/long-range planning.    Activities:   no strenuous exercise for 6 weeks    Nursing:   Agency to admit patient within 24 hours of hospital discharge unless specified on physician order or at patient request    SN to complete comprehensive assessment including routine vital signs. Instruct on disease process and s/s of complications to report to MD. Review/verify medication list sent home with the patient at time of discharge  and instruct patient/caregiver as needed. Frequency may be adjusted depending on start of care date.     Skilled nurse to perform up to 3 visits PRN for symptoms related to diagnosis    Notify MD if SBP > 160 or < 90; DBP > 90 or < 50; HR > 120 or < 50; Temp > 101; O2 < 88%; Other:       Ok to schedule additional visits based on staff availability and patient request on consecutive days within the home health episode.    When multiple disciplines ordered:    Start of Care occurs on Sunday - Wednesday schedule remaining discipline evaluations as ordered on  separate consecutive days following the start of care.    Thursday SOC -schedule subsequent evaluations Friday and Monday the following week.     Friday - Saturday SOC - schedule subsequent discipline evaluations on consecutive days starting Monday of the following week.    For all post-discharge communication and subsequent orders please contact patient's primary care physician. If unable to reach primary care physician or do not receive response within 30 minutes, please contact Dr. liu for clinical staff order clarification    Miscellaneous   Routine ileosotmy care  Consult osotmy nurse to continue wound care and education  Call office [phone] for output greater then 1500cc in 24 hours from ileosotmy     Nursing Three times weekly, Physical Therapy Three times weekly, and Occupational Therapy Three times weekly    Wound Care Orders  yes:  Surgical Wound:  Location: abd    Cleanse wound with wound cleanse and keep clean and dry, may shower no tub bath     I certify that this patient is confined to his home and needs intermittent skilled nursing care, physical therapy, and occupational therapy.

## 2023-04-11 NOTE — PT/OT/SLP PROGRESS
Occupational Therapy   Treatment    Name: KIRSTY Mason  MRN: [id number]  Admitting Diagnosis:  Rectal cancer  5 Days Post-Op    Recommendations:     Discharge Recommendations: other (see comments)  Discharge Equipment Recommendations:  none  Barriers to discharge:  None    Assessment:     KIRSTY Mason is a 67 y.o. male with a medical diagnosis of Rectal cancer.  Performance deficits affecting function are weakness, impaired endurance, impaired functional mobility, gait instability, impaired balance, pain. Patient would benefit from continued skilled acute OT 3x/wk to improve functional mobility, increase independence with ADLs, and address established goals prior to discharge. Patient ambulated to and from bathroom with SBA while holding onto wall, doorframe, and items in path at times. Patient encouraged to utilize a RW at home at this time for safety. Patient would benefit from continued skilled acute OT 3x/wk to improve functional mobility, increase independence with ADLs, and address established goals prior to discharge.    Rehab Prognosis:  Good; patient would benefit from acute skilled OT services to address these deficits and reach maximum level of function.       Plan:     Patient to be seen 3 x/week to address the above listed problems via self-care/home management, therapeutic activities, therapeutic exercises  Plan of Care Expires: 04/10/23  Plan of Care Reviewed with: patient    Subjective     Chief Complaint: none  Patient/Family Comments/goals: to go home  Pain/Comfort:  Pain Rating 1: 0/10  Pain Rating Post-Intervention 1: 0/10    Objective:     Communicated with: ROSE prior to session.  Patient found HOB elevated with  (all lines intact) upon OT entry to room.    General Precautions: Standard, fall    Orthopedic Precautions:N/A  Braces: N/A  Respiratory Status: Room air     Occupational Performance:     Bed Mobility:    Patient completed Supine to Sit with modified independence  Patient completed Sit to Supine  with modified independence     Functional Mobility/Transfers:  Patient completed Sit <> Stand Transfer with supervision  with  no assistive device   Patient completed Toilet Transfer bed<>toilet with functional ambulation technique with stand by assistance with  no AD      Activities of Daily Living:  Grooming: supervision standing at sink for oral care  Lower Body Dressing: total assistance due to pain      AMPA 6 Click ADL: 18    Treatment & Education:  Role of OT and POC  ADL retraining  Functional mobility training  Safety  Discharge planning  Importance EOB/OOB activity    Patient left HOB elevated with all lines intact, call button in reach, and all needs met.     GOALS:   Multidisciplinary Problems       Occupational Therapy Goals          Problem: Occupational Therapy    Goal Priority Disciplines Outcome Interventions   Occupational Therapy Goal     OT, PT/OT Ongoing, Progressing    Description: Goals to be met by: 4/28/2023    Patient will increase functional independence with ADLs by performing:    UE Dressing with Set-up Assistance.  LE Dressing with Supervision.  Grooming while standing at sink with Modified [place].  Toileting from toilet with Modified [place] for hygiene and clothing management.   Supine to sit with Modified [place].  Stand pivot transfers with Modified [place].  Toilet transfer to toilet with Modified [place].                         Time Tracking:     OT Date of Treatment: 04/11/23  OT Start Time: 1417  OT Stop Time: 1440  OT Total Time (min): 23 min    Billable Minutes:Self Care/Home Management 23 4/11/2023

## 2023-04-11 NOTE — NURSING
Pt sitting up in chair, AAOX4. RR even and regular. Pt calm, denies any concerns at this time. Plan of care discussed. Fall precautions explained. Chair locked, call light in reach. Will cont to monitor.

## 2023-04-11 NOTE — ASSESSMENT & PLAN NOTE
Endocrinology consulted for BG management.   BG goal 140-180    - Levemir (Insulin Detemir) 16 units daily (WBD 0.5 units/kg/day started due to basal being held overnight)  - Novolog (Insulin Aspart) 3-6 units TIDWM and prn for BG excursions [facility] (150/25)  - BG checks /HS/0200  - Hypoglycemia protocol in place  - If blood glucose greater than 300, please ask patient not to eat food or drink anything other than water until correctional insulin has brought it back below 250    ** Please notify Endocrine for any change and/or advance in diet**  ** Please call Endocrine for any BG related issues **    Discharge Planning:   TBD. Please notify endocrinology prior to discharge.

## 2023-04-11 NOTE — PROGRESS NOTES
Patient and patient's wife seen for ostomy lesson. The pouch is intact, without leakage.  Reviewed reading materials and answered all questions.  Pt verbalized understanding of when to alert MD of issues with stoma. Supplies for discharge left at the bedside. Will follow-up.

## 2023-04-11 NOTE — PT/OT/SLP PROGRESS
Physical Therapy Treatment    Patient Name:  KIRSTY Mason   MRN:  [id number]    Recommendations:     Discharge Recommendations: other (see comments)  Discharge Equipment Recommendations: none  Barriers to discharge: None    Assessment:     KIRSTY Mason is a 67 y.o. male admitted with a medical diagnosis of Rectal cancer.  He presents with the following impairments/functional limitations: impaired endurance, impaired functional mobility, pain. Pt cooperated, and participated well with therapy today. Pt requires SPV with bed mobility, SBA no AD for t/fs, and ambulated 350ft SBA with RW. Pt stated he feels more comfortable using RW at this time. Resume PT POC as indicated.     Rehab Prognosis: Good; patient would benefit from acute skilled PT services to address these deficits and reach maximum level of function.    Recent Surgery: Procedure(s) (LRB):  COLECTOMY, LAPAROSCOPIC LOWER ANTERIOR, SPLENIC PLEXOR MOBILIZATION  (N/A)  SIGMOIDOSCOPY, FLEXIBLE (N/A)  CREATION, ILEOSTOMY (Right) 5 Days Post-Op    Plan:     During this hospitalization, patient to be seen 3 x/week to address the identified rehab impairments via gait training, therapeutic activities, therapeutic exercises and progress toward the following goals:    Plan of Care Expires:  05/07/23    Subjective     Chief Complaint: n/a  Patient/Family Comments/goals: n/a  Pain/Comfort:  Pain Rating 1: 0/10  Pain Rating Post-Intervention 1: 0/10      Objective:     Communicated with nursing prior to session.  Patient found HOB elevated with  (all lines intact) upon PT entry to room.     General Precautions: Standard, fall  Orthopedic Precautions: N/A  Braces: N/A  Respiratory Status: Room air     Functional Mobility:  Bed Mobility:  Scooting: supervision  Supine to Sit: supervision  Sit to Supine: supervision  Transfers:  Sit to Stand:  stand by assistance with no AD  Gait: 350ft SBA using RW, with IV managed by PTA. Pt stated he feels more comfortable using RW at this time.    Balance: Pt with good sitting and standing balance.       AM-PAC 6 CLICK MOBILITY   Total Score: 22     Treatment & Education:  -BLE therex: AP x30 reps, LAQ x15 reps, HF x15 reps  -All questions/concerns answered within PTA scope of practice.   -Pt required assistance with donning socks on this date.     Patient left HOB elevated with all lines intact, call button in reach, and nursing notified..    GOALS:   Multidisciplinary Problems       Physical Therapy Goals          Problem: Physical Therapy    Goal Priority Disciplines Outcome Goal Variances Interventions   Physical Therapy Goal     PT, PT/OT Ongoing, Progressing     Description: Goals to be met by: 2023     Patient will increase functional independence with mobility by performin. Supine to sit with Set-up [place]  2. Sit to supine with Set-up [place]  3. Sit to stand transfer with Supervision  4. Bed to chair transfer with Supervision using No Assistive Device  5. Gait  x 300 feet with Supervision using No Assistive Device.   6. Lower extremity exercise program x15 reps per handout, with supervision                         Time Tracking:     PT Received On: 23  PT Start Time: 910     PT Stop Time: 933  PT Total Time (min): 23 min     Billable Minutes: Gait Training 10 and Therapeutic Activity 13    Treatment Type: Treatment  PT/PTA: PTA     Number of PTA visits since last PT visit: 2023

## 2023-04-11 NOTE — SUBJECTIVE & OBJECTIVE
Subjective:     Interval History: NAEON. Reports feeling much better, no longer experiencing nausea or vomiting. Ostomy with good output. Abdominal pain well controlled.    Post-Op Info:  Procedure(s) (LRB):  COLECTOMY, LAPAROSCOPIC LOWER ANTERIOR, SPLENIC PLEXOR MOBILIZATION  (N/A)  SIGMOIDOSCOPY, FLEXIBLE (N/A)  CREATION, ILEOSTOMY (Right)   5 Days Post-Op      Medications:  Continuous Infusions:   dextrose 5 % and 0.9 % NaCl 75 mL/hr at 04/11/23 0511     Scheduled Meds:   acetaminophen  1,000 mg Oral Q8H    cetirizine  10 mg Oral Daily    enoxaparin  40 mg Subcutaneous Daily    gabapentin  300 mg Oral TID    ibuprofen  800 mg Oral Q8H    insulin detemir U-100  28 Units Subcutaneous QHS    mupirocin   Nasal BID    pantoprazole  40 mg Intravenous Daily     PRN Meds:   dextrose 10%    dextrose 10%    dextrose 10%    dextrose 10%    dextrose    dextrose    glucagon (human recombinant)    insulin aspart U-100    labetalol    ondansetron    oxyCODONE    oxyCODONE    prochlorperazine    sodium chloride 0.9%    traMADoL        Objective:     Vital Signs (Most Recent):  Temp: 97.6 °F (36.4 °C) (04/11/23 0759)  Pulse: 83 (04/11/23 0759)  Resp: 20 (04/11/23 0759)  BP: 137/63 (04/11/23 0759)  SpO2: 95 % (04/11/23 0759) Vital Signs (24h Range):  Temp:  [97.6 °F (36.4 °C)-98.7 °F (37.1 °C)] 97.6 °F (36.4 °C)  Pulse:  [83-93] 83  Resp:  [18-20] 20  SpO2:  [93 %-95 %] 95 %  BP: (137-174)/(63-84) 137/63     Intake/Output - Last 3 Shifts         04/09 0700  04/10 0659 04/10 0700 04/11 0659 04/11 0700 04/12 0659    P.O. 960      Total Intake(mL/kg) 960 (8.6)      Urine (mL/kg/hr) 150 (0.1) 175 (0.1)     Stool 500 100 300    Total Output 650 275 300    Net +310 -275 -300           Urine Occurrence 4 x      Stool Occurrence 1 x              Physical Exam  Constitutional:       General: He is not in acute distress.     Appearance: Normal appearance. He is not ill-appearing.   HENT:      Head: Atraumatic.   Eyes:      Extraocular  Movements: Extraocular movements intact.   Cardiovascular:      Rate and Rhythm: Normal rate.   Pulmonary:      Effort: Pulmonary effort is normal.   Abdominal:      General: Abdomen is flat.      Palpations: Abdomen is soft. Mild distention.     Comments: Gas and stool in ostomy bag  Incisions CDI   Musculoskeletal:         General: Normal range of motion.   Skin:     General: Skin is warm and dry.   Neurological:      General: No focal deficit present.   Psychiatric:         Mood and Affect: Mood normal.         Behavior: Behavior normal.      Significant Labs:  BMP (Last 3 Results):   Recent Labs   Lab 04/08/23  0626 04/09/23  0519 04/10/23  1001   * 69* 175*   * 138 137   K 3.4* 3.4* 4.2    105 102   CO2 25 23 20*   BUN 18 13 18   CREATININE 0.9 0.9 0.8   CALCIUM 8.5* 8.6* 9.5   MG 2.2 2.0 2.0     CBC (Last 3 Results):   Recent Labs   Lab 04/08/23  0626 04/09/23  0519 04/10/23  1001   WBC 9.84 8.45 10.10   RBC 3.33* 3.35* 4.07*   HGB 10.6* 10.6* 12.8*   HCT 31.8* 31.9* 37.9*   * 143* 233   MCV 96 95 93   MCH 31.8* 31.6* 31.4*   MCHC 33.3 33.2 33.8       Significant Diagnostics:  None

## 2023-04-12 ENCOUNTER — PATIENT MESSAGE (OUTPATIENT)
Dept: ENDOCRINOLOGY | Facility: HOSPITAL | Age: 68
End: 2023-04-12
Payer: MEDICARE

## 2023-04-12 NOTE — NURSING
Iv taken out, vss. AVS given to patient and given education on ostomy, s/s of infection, and safety information on oxycodone. Wheeled down by myself to wife on 2nd floor garage.all questions answered

## 2023-04-13 NOTE — DISCHARGE SUMMARY
[facility]  General Surgery  Discharge Summary      Patient Name: KIRSTY Mason  MRN: [id number]  Admission Date: 4/6/2023  Hospital Length of Stay: 5 days  Discharge Date and Time: 4/11/2023  8:23 PM  Attending Physician: Daysi att. providers found   Discharging Provider: Lillie Stephens MD  Primary Care Provider: Jass Mcdermott II, MD     HPI: Patient is a 67 y.o. 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     Procedure(s) (LRB):  COLECTOMY, LAPAROSCOPIC LOWER ANTERIOR, SPLENIC PLEXOR MOBILIZATION  (N/A)  SIGMOIDOSCOPY, FLEXIBLE (N/A)  CREATION, ILEOSTOMY (Right)     Hospital Course:   Patient was admitted to the colorectal surgery service. He was made NPO, given IVF, as well as pain and nausea control. Started on antibiotics. KIRSTY MASON 67 y.o.male underwent: Procedure(s) (LRB):  COLECTOMY, LAPAROSCOPIC LOWER ANTERIOR, SPLENIC PLEXOR MOBILIZATION  (N/A)  SIGMOIDOSCOPY, FLEXIBLE (N/A)  CREATION, ILEOSTOMY (Right). The patient tolerated the procedure well, was transferred to recovery post-op, and then transferred to the [facility] for continuation of medical care. The patient's clinical condition progressively improved. Had ROBF. Leukocytosis improved. Patient was HDS throughout admission. By the time of discharge, he was meeting all post op milestones, tolerating a diet without nausea or vomiting, pain was well controlled with oral medications, and he was ambulating without difficulty. Voiding appropriately. On POD 5 the patient was discharged to home. On discharge, the patient's incisions were c/d/i and the surgical site was soft and appropriately tender to palpation. The patient will follow up in colorectal surgery clinic in 2 weeks. Discussed POC and ED precautions with patient. Patient verbalized understanding and is agreeable to plan. All questions answered.    Please see hospital and op notes for further detail regarding patient's admission.    Patient's  discharge was discussed with Dr. Hong.       Consults (From admission, onward)          Status Ordering Provider     Inpatient consult to Endocrinology  Once        Provider:  (Not yet assigned)    Completed JUVENAL WOLFF     Inpatient consult to Registered Dietitian/Nutritionist  Once        Provider:  (Not yet assigned)    Completed JAIR IRIZARRY              Indwelling Lines/Drains at time of discharge:   Lines/Drains/Airways       Central Venous Catheter Line  Duration                  PowerPort A Cath Single Lumen 05/19/22 0715 left subclavian 329 days              Drain  Duration                  Ileostomy 04/06/23 1345 Loop RUQ 6 days                    Significant Diagnostic Studies: Labs: BMP: No results for input(s): GLU, NA, K, CL, CO2, BUN, CREATININE, CALCIUM, MG in the last 48 hours. and CBC No results for input(s): WBC, HGB, HCT, PLT in the last 48 hours.    Pending Diagnostic Studies:       Procedure Component Value Units Date/Time    Specimen to Pathology, Surgery General Surgery [[id number]] Collected: 04/06/23 1308    Order Status: Sent Lab Status: In process Updated: 04/06/23 1926    Specimen: Tissue             Final Active Diagnoses:    Diagnosis Date Noted POA    PRINCIPAL PROBLEM:  Rectal cancer [C20] 05/19/2022 Yes    MELY (obstructive sleep apnea) [G47.33] 03/28/2023 Yes    Uncontrolled type 2 diabetes mellitus with hyperglycemia [E11.65] 02/14/2020 Yes    Dyslipidemia [E78.5] 03/04/2019 Yes      Problems Resolved During this Admission:        Discharged Condition: good    Disposition: Home or Self Care    Follow Up:   Follow-up Information       H West Hong MD Follow up in 2 week(s).    Specialty: Colon and Rectal Surgery  Contact information:  [address] [place] [id number] [phone]                             Patient Instructions:      Diet Adult Regular     Change dressing (specify)   Order Comments: WOUND CARE  You have skin glue over your incision(s);  this will slowly flake away over the next few weeks.  -- Ok to shower; however, no baths or submerging in water (I.e. swimming, submerging in water) for at least two weeks.  -- Please keep the incision clean with soap and water, pat your incision dry, do not scrub hard over your incisions.    MEDICATIONS AND PAIN CONTROL  -- Please resume all home medications as instructed and take any newly prescribed medications.  -- Most people find that over-the-counter pain medications (Tylenol combined with Ibuprofen) will be sufficient for most pain control.  -- If you're taking prescription narcotics, do not drive or operate heavy machinery. Do not drive if your pain is not controlled enough for you to react quickly safely.  -- Take a stool softener with narcotics medications to prevent constipation.    OTHER INSTRUCTIONS  -- Monitor for temp > 101 F, bleeding, redness, purulent drainage, or any sudden, new extreme pain. If any occur, please call our clinic or go to the emergency department if after normal business hours.  -- You may resume your regular diet as tolerated.   -- No heavy lifting (anything >10 lbs or = to a gallon of milk) or strenuous exercise until cleared by a physician.  -- Follow up with Dr. Hong in 2 weeks in clinic for a post-op check. If no appointment is made within the week, please call the clinic to schedule.     Notify your health care provider if you experience any of the following:  temperature >100.4     Notify your health care provider if you experience any of the following:  persistent nausea and vomiting or diarrhea     Notify your health care provider if you experience any of the following:  severe uncontrolled pain     Notify your health care provider if you experience any of the following:  redness, tenderness, or signs of infection (pain, swelling, redness, odor or green/yellow discharge around incision site)     Notify your health care provider if you experience any of the following:   difficulty breathing or increased cough     Notify your health care provider if you experience any of the following:  severe persistent headache     Notify your health care provider if you experience any of the following:  worsening rash     Notify your health care provider if you experience any of the following:  persistent dizziness, light-headedness, or visual disturbances     Notify your health care provider if you experience any of the following:  increased confusion or weakness     Activity as tolerated       Medications:  Reconciled Home Medications:      Medication List        START taking these medications      oxyCODONE 5 MG immediate release tablet  Commonly known as: ROXICODONE  Take 1 tablet (5 mg total) by mouth every 4 (four) hours as needed for Pain.            CHANGE how you take these medications      fluticasone propionate 50 mcg/actuation nasal spray  Commonly known as: FLONASE  INHALE ONE SPRAY(S) IN EACH NOSTRIL ONCE DAILY AS NEEDED  What changed: See the new instructions.     LEVEMIR FLEXTOUCH U-100 INSULN 100 unit/mL (3 mL) Inpn pen  Generic drug: insulin detemir U-100 (Levemir)  ADMINISTER 25 UNITS UNDER THE SKIN EVERY DAY  What changed: See the new instructions.            CONTINUE taking these medications      aspirin 81 MG EC tablet  Commonly known as: ECOTRIN  Take 1 tablet by mouth once daily. On hold per pt possible surgery     b complex vitamins tablet  Take 1 tablet by mouth once daily.     * blood sugar diagnostic Strp  1 strip by Misc.(Non-Drug; Combo Route) route 3 (three) times daily before meals.     * blood sugar diagnostic Strp  1 strip by Misc.(Non-Drug; Combo Route) route every meal as needed (with meals and PRN).     blood-glucose meter kit  Commonly known as: BLOOD GLUCOSE MONITORING  Use as instructed     cetirizine 10 MG tablet  Commonly known as: ZYRTEC  Take 10 mg by mouth once daily.     cinnamon bark 500 mg capsule  Take 500 mg by mouth once daily.     furosemide 20  "MG tablet  Commonly known as: LASIX  TAKE 1 TO 2 TABLETS BY MOUTH ONCE DAILY AS NEEDED FOR  SWELLING     insulin lispro 100 unit/mL injection  Inject 22 Units into the skin 2 (two) times a day. With meals    Does not take with evening meal     metroNIDAZOLE 500 MG tablet  Commonly known as: FLAGYL  Take 1 tablet (500 mg total) by mouth 2 (two) times daily.     naproxen 500 MG tablet  Commonly known as: NAPROSYN  Take 500 mg by mouth 2 (two) times daily with meals.     OCUVITE LUTEIN ORAL  Take 1 tablet by mouth once daily.     pantoprazole 40 MG tablet  Commonly known as: PROTONIX  Take 1 tablet (40 mg total) by mouth before breakfast.     pen needle, diabetic 32 gauge x 3/16" Ndle  Use for daily insulin injections     PROAIR HFA 90 mcg/actuation inhaler  Generic drug: albuterol  INHALE 2 PUFFS BY MOUTH EVERY 6 HOURS AS NEEDED FOR WHEEZING RESCUE     UNABLE TO FIND  Prevagen take 1 tablet daily     URINOZINC PROSTATE FORMULA ORAL  Take 1 tablet by mouth 2 (two) times daily.           * This list has 2 medication(s) that are the same as other medications prescribed for you. Read the directions carefully, and ask your doctor or other care provider to review them with you.                  Lillie Stephens MD           Patient was seen and examined on the date of discharge and determined to be suitable for discharge.  Total time spent preparing discharge services: 10 minutes.  Time was spent speaking with consultants and case management, reviewing records, and/or discussing the plan of care with patient/family.      Lillie Stephens M.D.  General Surgery PGY-1      "

## 2023-04-18 LAB
FINAL PATHOLOGIC DIAGNOSIS: NORMAL
Lab: NORMAL
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
## 2023-04-26 ENCOUNTER — LAB VISIT (OUTPATIENT)
Dept: LAB | Facility: HOSPITAL | Age: 68
End: 2023-04-26
Attending: COLON & RECTAL SURGERY
Payer: MEDICARE

## 2023-04-26 ENCOUNTER — OFFICE VISIT (OUTPATIENT)
Dept: SURGERY | Facility: CLINIC | Age: 68
End: 2023-04-26
Payer: MEDICARE

## 2023-04-26 VITALS
WEIGHT: 221.69 LBS | DIASTOLIC BLOOD PRESSURE: 64 MMHG | BODY MASS INDEX: 29.38 KG/M2 | HEIGHT: 73 IN | HEART RATE: 94 BPM | SYSTOLIC BLOOD PRESSURE: 116 MMHG

## 2023-04-26 DIAGNOSIS — C20 RECTAL CANCER: Primary | ICD-10-CM

## 2023-04-26 DIAGNOSIS — C20 RECTAL CANCER: ICD-10-CM

## 2023-04-26 LAB
ALBUMIN SERPL BCP-MCNC: 3.7 G/DL (ref 3.5–5.2)
ALP SERPL-CCNC: 178 U/L (ref 55–135)
ALT SERPL W/O P-5'-P-CCNC: 21 U/L (ref 10–44)
ANION GAP SERPL CALC-SCNC: 8 MMOL/L (ref 8–16)
AST SERPL-CCNC: 23 U/L (ref 10–40)
BASOPHILS # BLD AUTO: 0.06 K/UL (ref 0–0.2)
BASOPHILS NFR BLD: 1 % (ref 0–1.9)
BILIRUB SERPL-MCNC: 0.6 MG/DL (ref 0.1–1)
BUN SERPL-MCNC: 21 MG/DL (ref 8–23)
CALCIUM SERPL-MCNC: 9.5 MG/DL (ref 8.7–10.5)
CHLORIDE SERPL-SCNC: 103 MMOL/L (ref 95–110)
CO2 SERPL-SCNC: 26 MMOL/L (ref 23–29)
CREAT SERPL-MCNC: 1.1 MG/DL (ref 0.5–1.4)
CRP SERPL-MCNC: 6.1 MG/L (ref 0–8.2)
DIFFERENTIAL METHOD: ABNORMAL
EOSINOPHIL # BLD AUTO: 0.2 K/UL (ref 0–0.5)
EOSINOPHIL NFR BLD: 2.5 % (ref 0–8)
ERYTHROCYTE [DISTWIDTH] IN BLOOD BY AUTOMATED COUNT: 12.3 % (ref 11.5–14.5)
EST. GFR  (NO RACE VARIABLE): >60 ML/MIN/1.73 M^2
GLUCOSE SERPL-MCNC: 198 MG/DL (ref 70–110)
HCT VFR BLD AUTO: 37.3 % (ref 40–54)
HGB BLD-MCNC: 12.8 G/DL (ref 14–18)
IMM GRANULOCYTES # BLD AUTO: 0.02 K/UL (ref 0–0.04)
IMM GRANULOCYTES NFR BLD AUTO: 0.3 % (ref 0–0.5)
LYMPHOCYTES # BLD AUTO: 2.1 K/UL (ref 1–4.8)
LYMPHOCYTES NFR BLD: 35.8 % (ref 18–48)
MCH RBC QN AUTO: 31.4 PG (ref 27–31)
MCHC RBC AUTO-ENTMCNC: 34.3 G/DL (ref 32–36)
MCV RBC AUTO: 92 FL (ref 82–98)
MONOCYTES # BLD AUTO: 0.7 K/UL (ref 0.3–1)
MONOCYTES NFR BLD: 11.2 % (ref 4–15)
NEUTROPHILS # BLD AUTO: 2.9 K/UL (ref 1.8–7.7)
NEUTROPHILS NFR BLD: 49.2 % (ref 38–73)
NRBC BLD-RTO: 0 /100 WBC
PLATELET # BLD AUTO: 258 K/UL (ref 150–450)
PMV BLD AUTO: 9.3 FL (ref 9.2–12.9)
POTASSIUM SERPL-SCNC: 4.5 MMOL/L (ref 3.5–5.1)
PREALB SERPL-MCNC: 23 MG/DL (ref 20–43)
PROT SERPL-MCNC: 7.6 G/DL (ref 6–8.4)
RBC # BLD AUTO: 4.07 M/UL (ref 4.6–6.2)
SODIUM SERPL-SCNC: 137 MMOL/L (ref 136–145)
WBC # BLD AUTO: 5.97 K/UL (ref 3.9–12.7)

## 2023-04-26 PROCEDURE — 1101F PR PT FALLS ASSESS DOC 0-1 FALLS W/OUT INJ PAST YR: ICD-10-PCS | Mod: CPTII,S$GLB,, | Performed by: COLON & RECTAL SURGERY

## 2023-04-26 PROCEDURE — 86140 C-REACTIVE PROTEIN: CPT | Performed by: COLON & RECTAL SURGERY

## 2023-04-26 PROCEDURE — 99999 PR PBB SHADOW E&M-EST. PATIENT-LVL IV: ICD-10-PCS | Mod: PBBFAC,,, | Performed by: COLON & RECTAL SURGERY

## 2023-04-26 PROCEDURE — 99999 PR PBB SHADOW E&M-EST. PATIENT-LVL IV: CPT | Mod: PBBFAC,,, | Performed by: COLON & RECTAL SURGERY

## 2023-04-26 PROCEDURE — 3046F HEMOGLOBIN A1C LEVEL >9.0%: CPT | Mod: CPTII,S$GLB,, | Performed by: COLON & RECTAL SURGERY

## 2023-04-26 PROCEDURE — 80053 COMPREHEN METABOLIC PANEL: CPT | Performed by: COLON & RECTAL SURGERY

## 2023-04-26 PROCEDURE — 3008F BODY MASS INDEX DOCD: CPT | Mod: CPTII,S$GLB,, | Performed by: COLON & RECTAL SURGERY

## 2023-04-26 PROCEDURE — 85025 COMPLETE CBC W/AUTO DIFF WBC: CPT | Performed by: COLON & RECTAL SURGERY

## 2023-04-26 PROCEDURE — 36415 COLL VENOUS BLD VENIPUNCTURE: CPT | Performed by: COLON & RECTAL SURGERY

## 2023-04-26 PROCEDURE — 1101F PT FALLS ASSESS-DOCD LE1/YR: CPT | Mod: CPTII,S$GLB,, | Performed by: COLON & RECTAL SURGERY

## 2023-04-26 PROCEDURE — 1126F PR PAIN SEVERITY QUANTIFIED, NO PAIN PRESENT: ICD-10-PCS | Mod: CPTII,S$GLB,, | Performed by: COLON & RECTAL SURGERY

## 2023-04-26 PROCEDURE — 3078F PR MOST RECENT DIASTOLIC BLOOD PRESSURE < 80 MM HG: ICD-10-PCS | Mod: CPTII,S$GLB,, | Performed by: COLON & RECTAL SURGERY

## 2023-04-26 PROCEDURE — 3046F PR MOST RECENT HEMOGLOBIN A1C LEVEL > 9.0%: ICD-10-PCS | Mod: CPTII,S$GLB,, | Performed by: COLON & RECTAL SURGERY

## 2023-04-26 PROCEDURE — 1126F AMNT PAIN NOTED NONE PRSNT: CPT | Mod: CPTII,S$GLB,, | Performed by: COLON & RECTAL SURGERY

## 2023-04-26 PROCEDURE — 3074F SYST BP LT 130 MM HG: CPT | Mod: CPTII,S$GLB,, | Performed by: COLON & RECTAL SURGERY

## 2023-04-26 PROCEDURE — 1159F MED LIST DOCD IN RCRD: CPT | Mod: CPTII,S$GLB,, | Performed by: COLON & RECTAL SURGERY

## 2023-04-26 PROCEDURE — 3288F PR FALLS RISK ASSESSMENT DOCUMENTED: ICD-10-PCS | Mod: CPTII,S$GLB,, | Performed by: COLON & RECTAL SURGERY

## 2023-04-26 PROCEDURE — 99024 PR POST-OP FOLLOW-UP VISIT: ICD-10-PCS | Mod: S$GLB,,, | Performed by: COLON & RECTAL SURGERY

## 2023-04-26 PROCEDURE — 3078F DIAST BP <80 MM HG: CPT | Mod: CPTII,S$GLB,, | Performed by: COLON & RECTAL SURGERY

## 2023-04-26 PROCEDURE — 84134 ASSAY OF PREALBUMIN: CPT | Performed by: COLON & RECTAL SURGERY

## 2023-04-26 PROCEDURE — 99024 POSTOP FOLLOW-UP VISIT: CPT | Mod: S$GLB,,, | Performed by: COLON & RECTAL SURGERY

## 2023-04-26 PROCEDURE — 3074F PR MOST RECENT SYSTOLIC BLOOD PRESSURE < 130 MM HG: ICD-10-PCS | Mod: CPTII,S$GLB,, | Performed by: COLON & RECTAL SURGERY

## 2023-04-26 PROCEDURE — 3008F PR BODY MASS INDEX (BMI) DOCUMENTED: ICD-10-PCS | Mod: CPTII,S$GLB,, | Performed by: COLON & RECTAL SURGERY

## 2023-04-26 PROCEDURE — 3288F FALL RISK ASSESSMENT DOCD: CPT | Mod: CPTII,S$GLB,, | Performed by: COLON & RECTAL SURGERY

## 2023-04-26 PROCEDURE — 1159F PR MEDICATION LIST DOCUMENTED IN MEDICAL RECORD: ICD-10-PCS | Mod: CPTII,S$GLB,, | Performed by: COLON & RECTAL SURGERY

## 2023-05-19 ENCOUNTER — PATIENT MESSAGE (OUTPATIENT)
Dept: WOUND CARE | Facility: CLINIC | Age: 68
End: 2023-05-19
Payer: MEDICARE

## 2023-05-19 DIAGNOSIS — Z43.2 ATTENTION TO ILEOSTOMY: Primary | ICD-10-CM
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
## 2023-05-22 NOTE — PROGRESS NOTES
"CC: This 67 y.o. male presents for management of diabetes  and chronic conditions pending review including  HLP, rectal ca, MELY    HPI: He was diagnosed with T2DM in pre- . Has never been hospitalized r/t DM.  Family hx of DM: mother     Followed by endocrine at [facility] s/p loop colostomy 2023  Plans to have reversed about 1.5 months from now   hypoglycemia at home- yes  monitoring BG at home:  Fastin this am  Generally he runs 90-120s  Pre-BT- 100-250    Diet: Eats 2-3 Meals a day, snacks  May skip lunch  Exercise: none   CURRENT DM MEDS: levemir 25 u bid and Humalog 22 u  breakfast and supper  Previous meds: metformin- not effective, VEGAS- ineffective,   Timing prandial insulin 5-15 minutes before meals: yes   Vial/pen:  Uses pens   Glucometer type: Ascenia Contour     Standards of Care:  Eye exam: 2023, Dr Terry,    [place]     ROS:   Gen: Appetite good,    Eyes: Denies visual disturbances  Resp: no SOB or CATALAN, no cough  Cardiac: No palpitations, chest pain, no edema   GI: No nausea or vomiting, diarrhea, constipation   /GYN: 0-2+ nocturia, no burning or pain. + colostomy  MS/Neuro: +numbness/ tingling in BLE; Gait steady, speech clear  Psych: Denies drug/ETOH abuse, no hx of depression.  Other systems: negative.    PE:  GENERAL: Well developed, well nourished.  PSYCH: AAOx3, appropriate mood and affect, pleasant expression, conversant, appears relaxed, well groomed.   EYES: Conjunctiva, corneas clear  NECK: Supple, trachea midline  ABDOMEN: Soft, non-tender, non-distended   VASCULAR:  BLE edema.L>R  NEURO: Gait steady  SKIN:  no acanthosis nigracans.  FOOT EXAMINATION: 2022     Personally reviewed Past Medical, Surgical, Social History.    /68 (BP Location: Left arm, Patient Position: Sitting, BP Method: Large (Manual))   Pulse 96   Ht 6' 1" (1.854 m)   Wt 101.9 kg (224 lb 12.1 oz)   SpO2 98%   BMI 29.65 kg/m²      Personally reviewed the below labs:      " Chemistry        Component Value Date/Time     04/26/2023 1405    K 4.5 04/26/2023 1405     04/26/2023 1405    CO2 26 04/26/2023 1405    BUN 21 04/26/2023 1405    CREATININE 1.1 04/26/2023 1405     (H) 04/26/2023 1405        Component Value Date/Time    CALCIUM 9.5 04/26/2023 1405    ALKPHOS 178 (H) 04/26/2023 1405    AST 23 04/26/2023 1405    ALT 21 04/26/2023 1405    BILITOT 0.6 04/26/2023 1405    ESTGFRAFRICA >60 07/25/2022 0726    EGFRNONAA >60 07/25/2022 0726            No results found for: TSH    Recent Labs   Lab 08/11/22  1148   LDL Cholesterol 154.2   HDL 63   Cholesterol 249 H        Results for orders placed or performed during the hospital encounter of 01/10/22   Vitamin D   Result Value Ref Range    Vit D, 25-Hydroxy 68 30 - 96 ng/mL     No results found for this or any previous visit.    Lab Results   Component Value Date    MICALBCREAT 4.8 08/11/2022       Hemoglobin A1C   Date Value Ref Range Status   03/28/2023 9.3 (H) 4.0 - 5.6 % Final     Comment:     ADA Screening Guidelines:  5.7-6.4%  Consistent with prediabetes  >or=6.5%  Consistent with diabetes    High levels of fetal hemoglobin interfere with the HbA1C  assay. Heterozygous hemoglobin variants (HbS, HgC, etc)do  not significantly interfere with this assay.   However, presence of multiple variants may affect accuracy.     05/19/2022 9.5 (H) 0.0 - 5.6 % Final     Comment:     Reference Interval:  5.0 - 5.6 Normal   5.7 - 6.4 High Risk   > 6.5 Diabetic      Hgb A1c results are standardized based on the (NGSP) National   Glycohemoglobin Standardization Program.      Hemoglobin A1C levels are related to mean serum/plasma glucose   during the preceding 2-3 months.        01/13/2022 12.9 (H) 0.0 - 5.6 % Final     Comment:     Reference Interval:  5.0 - 5.6 Normal   5.7 - 6.4 High Risk   > 6.5 Diabetic      Hgb A1c results are standardized based on the (NGSP) National   Glycohemoglobin Standardization Program.      Hemoglobin A1C  levels are related to mean serum/plasma glucose   during the preceding 2-3 months.             ASSESSMENT and PLAN:      1. T2DM with hyperglycemia, DM PN, DM DR  Sample Dexcom G7  Please send Current log and a log in 2 weeks   Likely needs injection with lunch as well    2. HLP- discussed statin, will re-visit at next visit    3. Rectal cancer- following w Dr Gomez        Follow-up: in 3 months with lab prior

## 2023-05-23 ENCOUNTER — OFFICE VISIT (OUTPATIENT)
Dept: ENDOCRINOLOGY | Facility: CLINIC | Age: 68
End: 2023-05-23
Payer: MEDICARE

## 2023-05-23 VITALS
HEIGHT: 73 IN | SYSTOLIC BLOOD PRESSURE: 128 MMHG | HEART RATE: 96 BPM | WEIGHT: 224.75 LBS | DIASTOLIC BLOOD PRESSURE: 68 MMHG | BODY MASS INDEX: 29.79 KG/M2 | OXYGEN SATURATION: 98 %

## 2023-05-23 DIAGNOSIS — Z79.4 TYPE 2 DIABETES MELLITUS WITH MILD NONPROLIFERATIVE RETINOPATHY, WITH LONG-TERM CURRENT USE OF INSULIN, MACULAR EDEMA PRESENCE UNSPECIFIED, UNSPECIFIED LATERALITY: ICD-10-CM

## 2023-05-23 DIAGNOSIS — E11.42 TYPE 2 DIABETES MELLITUS WITH DIABETIC POLYNEUROPATHY, WITH LONG-TERM CURRENT USE OF INSULIN: Primary | ICD-10-CM

## 2023-05-23 DIAGNOSIS — Z79.4 TYPE 2 DIABETES MELLITUS WITH DIABETIC POLYNEUROPATHY, WITH LONG-TERM CURRENT USE OF INSULIN: Primary | ICD-10-CM

## 2023-05-23 DIAGNOSIS — E11.65 UNCONTROLLED TYPE 2 DIABETES MELLITUS WITH HYPERGLYCEMIA: ICD-10-CM

## 2023-05-23 DIAGNOSIS — E11.3299 TYPE 2 DIABETES MELLITUS WITH MILD NONPROLIFERATIVE RETINOPATHY, WITH LONG-TERM CURRENT USE OF INSULIN, MACULAR EDEMA PRESENCE UNSPECIFIED, UNSPECIFIED LATERALITY: ICD-10-CM

## 2023-05-23 DIAGNOSIS — E78.5 DYSLIPIDEMIA: ICD-10-CM

## 2023-05-23 PROBLEM — E11.39 TYPE 2 DIABETES MELLITUS WITH OPHTHALMIC COMPLICATION, WITH LONG-TERM CURRENT USE OF INSULIN: Status: ACTIVE | Noted: 2023-05-23

## 2023-05-23 PROCEDURE — 1126F AMNT PAIN NOTED NONE PRSNT: CPT | Mod: CPTII,S$GLB,, | Performed by: NURSE PRACTITIONER

## 2023-05-23 PROCEDURE — 99999 PR PBB SHADOW E&M-EST. PATIENT-LVL V: CPT | Mod: PBBFAC,,, | Performed by: NURSE PRACTITIONER

## 2023-05-23 PROCEDURE — 3288F PR FALLS RISK ASSESSMENT DOCUMENTED: ICD-10-PCS | Mod: CPTII,S$GLB,, | Performed by: NURSE PRACTITIONER

## 2023-05-23 PROCEDURE — 99999 PR PBB SHADOW E&M-EST. PATIENT-LVL V: ICD-10-PCS | Mod: PBBFAC,,, | Performed by: NURSE PRACTITIONER

## 2023-05-23 PROCEDURE — 3074F PR MOST RECENT SYSTOLIC BLOOD PRESSURE < 130 MM HG: ICD-10-PCS | Mod: CPTII,S$GLB,, | Performed by: NURSE PRACTITIONER

## 2023-05-23 PROCEDURE — 1126F PR PAIN SEVERITY QUANTIFIED, NO PAIN PRESENT: ICD-10-PCS | Mod: CPTII,S$GLB,, | Performed by: NURSE PRACTITIONER

## 2023-05-23 PROCEDURE — 99214 PR OFFICE/OUTPT VISIT, EST, LEVL IV, 30-39 MIN: ICD-10-PCS | Mod: S$GLB,,, | Performed by: NURSE PRACTITIONER

## 2023-05-23 PROCEDURE — 1101F PT FALLS ASSESS-DOCD LE1/YR: CPT | Mod: CPTII,S$GLB,, | Performed by: NURSE PRACTITIONER

## 2023-05-23 PROCEDURE — 3046F PR MOST RECENT HEMOGLOBIN A1C LEVEL > 9.0%: ICD-10-PCS | Mod: CPTII,S$GLB,, | Performed by: NURSE PRACTITIONER

## 2023-05-23 PROCEDURE — 3008F PR BODY MASS INDEX (BMI) DOCUMENTED: ICD-10-PCS | Mod: CPTII,S$GLB,, | Performed by: NURSE PRACTITIONER

## 2023-05-23 PROCEDURE — 3074F SYST BP LT 130 MM HG: CPT | Mod: CPTII,S$GLB,, | Performed by: NURSE PRACTITIONER

## 2023-05-23 PROCEDURE — 1159F PR MEDICATION LIST DOCUMENTED IN MEDICAL RECORD: ICD-10-PCS | Mod: CPTII,S$GLB,, | Performed by: NURSE PRACTITIONER

## 2023-05-23 PROCEDURE — 1159F MED LIST DOCD IN RCRD: CPT | Mod: CPTII,S$GLB,, | Performed by: NURSE PRACTITIONER

## 2023-05-23 PROCEDURE — 3078F DIAST BP <80 MM HG: CPT | Mod: CPTII,S$GLB,, | Performed by: NURSE PRACTITIONER

## 2023-05-23 PROCEDURE — 3078F PR MOST RECENT DIASTOLIC BLOOD PRESSURE < 80 MM HG: ICD-10-PCS | Mod: CPTII,S$GLB,, | Performed by: NURSE PRACTITIONER

## 2023-05-23 PROCEDURE — 3288F FALL RISK ASSESSMENT DOCD: CPT | Mod: CPTII,S$GLB,, | Performed by: NURSE PRACTITIONER

## 2023-05-23 PROCEDURE — 99214 OFFICE O/P EST MOD 30 MIN: CPT | Mod: S$GLB,,, | Performed by: NURSE PRACTITIONER

## 2023-05-23 PROCEDURE — 3008F BODY MASS INDEX DOCD: CPT | Mod: CPTII,S$GLB,, | Performed by: NURSE PRACTITIONER

## 2023-05-23 PROCEDURE — 3046F HEMOGLOBIN A1C LEVEL >9.0%: CPT | Mod: CPTII,S$GLB,, | Performed by: NURSE PRACTITIONER

## 2023-05-23 PROCEDURE — 1101F PR PT FALLS ASSESS DOC 0-1 FALLS W/OUT INJ PAST YR: ICD-10-PCS | Mod: CPTII,S$GLB,, | Performed by: NURSE PRACTITIONER

## 2023-05-29 ENCOUNTER — OFFICE VISIT (OUTPATIENT)
Dept: SURGERY | Facility: CLINIC | Age: 68
End: 2023-05-29
Payer: MEDICARE

## 2023-05-29 DIAGNOSIS — C20 RECTAL CANCER: Primary | ICD-10-CM

## 2023-05-29 DIAGNOSIS — Z93.2 ILEOSTOMY STATUS: ICD-10-CM

## 2023-05-29 PROCEDURE — 99024 POSTOP FOLLOW-UP VISIT: CPT | Mod: S$GLB,,, | Performed by: COLON & RECTAL SURGERY

## 2023-05-29 PROCEDURE — 99024 PR POST-OP FOLLOW-UP VISIT: ICD-10-PCS | Mod: S$GLB,,, | Performed by: COLON & RECTAL SURGERY

## 2023-05-29 PROCEDURE — 3046F HEMOGLOBIN A1C LEVEL >9.0%: CPT | Mod: CPTII,S$GLB,, | Performed by: COLON & RECTAL SURGERY

## 2023-05-29 PROCEDURE — 3046F PR MOST RECENT HEMOGLOBIN A1C LEVEL > 9.0%: ICD-10-PCS | Mod: CPTII,S$GLB,, | Performed by: COLON & RECTAL SURGERY

## 2023-06-02 ENCOUNTER — LAB VISIT (OUTPATIENT)
Dept: LAB | Facility: HOSPITAL | Age: 68
End: 2023-06-02
Attending: INTERNAL MEDICINE
Payer: MEDICARE

## 2023-06-02 ENCOUNTER — OFFICE VISIT (OUTPATIENT)
Dept: CARDIOLOGY | Facility: CLINIC | Age: 68
End: 2023-06-02
Payer: MEDICARE

## 2023-06-02 VITALS
WEIGHT: 231.06 LBS | HEART RATE: 80 BPM | BODY MASS INDEX: 30.62 KG/M2 | DIASTOLIC BLOOD PRESSURE: 71 MMHG | SYSTOLIC BLOOD PRESSURE: 137 MMHG | HEIGHT: 73 IN

## 2023-06-02 DIAGNOSIS — E11.65 UNCONTROLLED TYPE 2 DIABETES MELLITUS WITH HYPERGLYCEMIA: ICD-10-CM

## 2023-06-02 DIAGNOSIS — I65.23 BILATERAL CAROTID ARTERY STENOSIS: ICD-10-CM

## 2023-06-02 DIAGNOSIS — I87.2 VENOUS STASIS DERMATITIS OF LEFT LOWER EXTREMITY: ICD-10-CM

## 2023-06-02 DIAGNOSIS — E78.5 DYSLIPIDEMIA: ICD-10-CM

## 2023-06-02 DIAGNOSIS — H35.033 HYPERTENSIVE RETINOPATHY OF BOTH EYES: ICD-10-CM

## 2023-06-02 DIAGNOSIS — R60.0 BILATERAL LOWER EXTREMITY EDEMA: ICD-10-CM

## 2023-06-02 DIAGNOSIS — G62.9 NEUROPATHY: Primary | ICD-10-CM

## 2023-06-02 DIAGNOSIS — G62.9 NEUROPATHY: ICD-10-CM

## 2023-06-02 DIAGNOSIS — G47.33 OSA (OBSTRUCTIVE SLEEP APNEA): ICD-10-CM

## 2023-06-02 DIAGNOSIS — R03.0 ELEVATED BLOOD PRESSURE READING IN OFFICE WITHOUT DIAGNOSIS OF HYPERTENSION: ICD-10-CM

## 2023-06-02 LAB — BNP SERPL-MCNC: 24 PG/ML (ref 0–99)

## 2023-06-02 PROCEDURE — 99204 OFFICE O/P NEW MOD 45 MIN: CPT | Mod: S$GLB,,, | Performed by: INTERNAL MEDICINE

## 2023-06-02 PROCEDURE — 3046F HEMOGLOBIN A1C LEVEL >9.0%: CPT | Mod: CPTII,S$GLB,, | Performed by: INTERNAL MEDICINE

## 2023-06-02 PROCEDURE — 99204 PR OFFICE/OUTPT VISIT, NEW, LEVL IV, 45-59 MIN: ICD-10-PCS | Mod: S$GLB,,, | Performed by: INTERNAL MEDICINE

## 2023-06-02 PROCEDURE — 83880 ASSAY OF NATRIURETIC PEPTIDE: CPT | Performed by: INTERNAL MEDICINE

## 2023-06-02 PROCEDURE — 1126F AMNT PAIN NOTED NONE PRSNT: CPT | Mod: CPTII,S$GLB,, | Performed by: INTERNAL MEDICINE

## 2023-06-02 PROCEDURE — 99999 PR PBB SHADOW E&M-EST. PATIENT-LVL IV: ICD-10-PCS | Mod: PBBFAC,,, | Performed by: INTERNAL MEDICINE

## 2023-06-02 PROCEDURE — 1160F PR REVIEW ALL MEDS BY PRESCRIBER/CLIN PHARMACIST DOCUMENTED: ICD-10-PCS | Mod: CPTII,S$GLB,, | Performed by: INTERNAL MEDICINE

## 2023-06-02 PROCEDURE — 1160F RVW MEDS BY RX/DR IN RCRD: CPT | Mod: CPTII,S$GLB,, | Performed by: INTERNAL MEDICINE

## 2023-06-02 PROCEDURE — 1101F PR PT FALLS ASSESS DOC 0-1 FALLS W/OUT INJ PAST YR: ICD-10-PCS | Mod: CPTII,S$GLB,, | Performed by: INTERNAL MEDICINE

## 2023-06-02 PROCEDURE — 3078F PR MOST RECENT DIASTOLIC BLOOD PRESSURE < 80 MM HG: ICD-10-PCS | Mod: CPTII,S$GLB,, | Performed by: INTERNAL MEDICINE

## 2023-06-02 PROCEDURE — 99999 PR PBB SHADOW E&M-EST. PATIENT-LVL IV: CPT | Mod: PBBFAC,,, | Performed by: INTERNAL MEDICINE

## 2023-06-02 PROCEDURE — 1159F PR MEDICATION LIST DOCUMENTED IN MEDICAL RECORD: ICD-10-PCS | Mod: CPTII,S$GLB,, | Performed by: INTERNAL MEDICINE

## 2023-06-02 PROCEDURE — 3075F PR MOST RECENT SYSTOLIC BLOOD PRESS GE 130-139MM HG: ICD-10-PCS | Mod: CPTII,S$GLB,, | Performed by: INTERNAL MEDICINE

## 2023-06-02 PROCEDURE — 3008F PR BODY MASS INDEX (BMI) DOCUMENTED: ICD-10-PCS | Mod: CPTII,S$GLB,, | Performed by: INTERNAL MEDICINE

## 2023-06-02 PROCEDURE — 3288F FALL RISK ASSESSMENT DOCD: CPT | Mod: CPTII,S$GLB,, | Performed by: INTERNAL MEDICINE

## 2023-06-02 PROCEDURE — 1159F MED LIST DOCD IN RCRD: CPT | Mod: CPTII,S$GLB,, | Performed by: INTERNAL MEDICINE

## 2023-06-02 PROCEDURE — 1126F PR PAIN SEVERITY QUANTIFIED, NO PAIN PRESENT: ICD-10-PCS | Mod: CPTII,S$GLB,, | Performed by: INTERNAL MEDICINE

## 2023-06-02 PROCEDURE — 3078F DIAST BP <80 MM HG: CPT | Mod: CPTII,S$GLB,, | Performed by: INTERNAL MEDICINE

## 2023-06-02 PROCEDURE — 3075F SYST BP GE 130 - 139MM HG: CPT | Mod: CPTII,S$GLB,, | Performed by: INTERNAL MEDICINE

## 2023-06-02 PROCEDURE — 3288F PR FALLS RISK ASSESSMENT DOCUMENTED: ICD-10-PCS | Mod: CPTII,S$GLB,, | Performed by: INTERNAL MEDICINE

## 2023-06-02 PROCEDURE — 3046F PR MOST RECENT HEMOGLOBIN A1C LEVEL > 9.0%: ICD-10-PCS | Mod: CPTII,S$GLB,, | Performed by: INTERNAL MEDICINE

## 2023-06-02 PROCEDURE — 3008F BODY MASS INDEX DOCD: CPT | Mod: CPTII,S$GLB,, | Performed by: INTERNAL MEDICINE

## 2023-06-02 PROCEDURE — 1101F PT FALLS ASSESS-DOCD LE1/YR: CPT | Mod: CPTII,S$GLB,, | Performed by: INTERNAL MEDICINE

## 2023-06-02 PROCEDURE — 36415 COLL VENOUS BLD VENIPUNCTURE: CPT | Mod: PO | Performed by: INTERNAL MEDICINE
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
## 2023-06-06 ENCOUNTER — PATIENT MESSAGE (OUTPATIENT)
Dept: SURGERY | Facility: CLINIC | Age: 68
End: 2023-06-06
Payer: MEDICARE

## 2023-06-12 ENCOUNTER — HOSPITAL ENCOUNTER (OUTPATIENT)
Dept: RADIOLOGY | Facility: HOSPITAL | Age: 68
Discharge: HOME | End: 2023-06-12
Attending: COLON & RECTAL SURGERY
Payer: MEDICARE

## 2023-06-12 ENCOUNTER — OFFICE VISIT (OUTPATIENT)
Dept: SURGERY | Facility: CLINIC | Age: 68
End: 2023-06-12
Payer: MEDICARE

## 2023-06-12 VITALS
WEIGHT: 232.81 LBS | OXYGEN SATURATION: 98 % | DIASTOLIC BLOOD PRESSURE: 60 MMHG | HEART RATE: 82 BPM | TEMPERATURE: 98 F | BODY MASS INDEX: 30.85 KG/M2 | RESPIRATION RATE: 16 BRPM | HEIGHT: 73 IN | SYSTOLIC BLOOD PRESSURE: 126 MMHG

## 2023-06-12 DIAGNOSIS — C20 RECTAL CANCER: Primary | ICD-10-CM

## 2023-06-12 DIAGNOSIS — C20 RECTAL CANCER: ICD-10-CM

## 2023-06-12 PROCEDURE — 3078F PR MOST RECENT DIASTOLIC BLOOD PRESSURE < 80 MM HG: ICD-10-PCS | Mod: CPTII,S$GLB,, | Performed by: COLON & RECTAL SURGERY

## 2023-06-12 PROCEDURE — 1159F PR MEDICATION LIST DOCUMENTED IN MEDICAL RECORD: ICD-10-PCS | Mod: CPTII,S$GLB,, | Performed by: COLON & RECTAL SURGERY

## 2023-06-12 PROCEDURE — 99999 PR PBB SHADOW E&M-EST. PATIENT-LVL IV: ICD-10-PCS | Mod: PBBFAC,,, | Performed by: COLON & RECTAL SURGERY

## 2023-06-12 PROCEDURE — 74177 CT ABD & PELVIS W/CONTRAST: CPT | Mod: 26,,, | Performed by: RADIOLOGY

## 2023-06-12 PROCEDURE — 3046F HEMOGLOBIN A1C LEVEL >9.0%: CPT | Mod: CPTII,S$GLB,, | Performed by: COLON & RECTAL SURGERY

## 2023-06-12 PROCEDURE — 3008F PR BODY MASS INDEX (BMI) DOCUMENTED: ICD-10-PCS | Mod: CPTII,S$GLB,, | Performed by: COLON & RECTAL SURGERY

## 2023-06-12 PROCEDURE — 74177 CT ABD & PELVIS W/CONTRAST: CPT | Mod: TC,PO

## 2023-06-12 PROCEDURE — 99024 PR POST-OP FOLLOW-UP VISIT: ICD-10-PCS | Mod: S$GLB,,, | Performed by: COLON & RECTAL SURGERY

## 2023-06-12 PROCEDURE — 1101F PR PT FALLS ASSESS DOC 0-1 FALLS W/OUT INJ PAST YR: ICD-10-PCS | Mod: CPTII,S$GLB,, | Performed by: COLON & RECTAL SURGERY

## 2023-06-12 PROCEDURE — 1126F PR PAIN SEVERITY QUANTIFIED, NO PAIN PRESENT: ICD-10-PCS | Mod: CPTII,S$GLB,, | Performed by: COLON & RECTAL SURGERY

## 2023-06-12 PROCEDURE — 3078F DIAST BP <80 MM HG: CPT | Mod: CPTII,S$GLB,, | Performed by: COLON & RECTAL SURGERY

## 2023-06-12 PROCEDURE — 1159F MED LIST DOCD IN RCRD: CPT | Mod: CPTII,S$GLB,, | Performed by: COLON & RECTAL SURGERY

## 2023-06-12 PROCEDURE — 99999 PR PBB SHADOW E&M-EST. PATIENT-LVL IV: CPT | Mod: PBBFAC,,, | Performed by: COLON & RECTAL SURGERY

## 2023-06-12 PROCEDURE — 3046F PR MOST RECENT HEMOGLOBIN A1C LEVEL > 9.0%: ICD-10-PCS | Mod: CPTII,S$GLB,, | Performed by: COLON & RECTAL SURGERY

## 2023-06-12 PROCEDURE — 3074F PR MOST RECENT SYSTOLIC BLOOD PRESSURE < 130 MM HG: ICD-10-PCS | Mod: CPTII,S$GLB,, | Performed by: COLON & RECTAL SURGERY

## 2023-06-12 PROCEDURE — 1101F PT FALLS ASSESS-DOCD LE1/YR: CPT | Mod: CPTII,S$GLB,, | Performed by: COLON & RECTAL SURGERY

## 2023-06-12 PROCEDURE — 3288F PR FALLS RISK ASSESSMENT DOCUMENTED: ICD-10-PCS | Mod: CPTII,S$GLB,, | Performed by: COLON & RECTAL SURGERY

## 2023-06-12 PROCEDURE — 3288F FALL RISK ASSESSMENT DOCD: CPT | Mod: CPTII,S$GLB,, | Performed by: COLON & RECTAL SURGERY

## 2023-06-12 PROCEDURE — A9698 NON-RAD CONTRAST MATERIALNOC: HCPCS | Mod: PO | Performed by: COLON & RECTAL SURGERY

## 2023-06-12 PROCEDURE — 25500020 PHARM REV CODE 255: Mod: PO | Performed by: COLON & RECTAL SURGERY

## 2023-06-12 PROCEDURE — 3074F SYST BP LT 130 MM HG: CPT | Mod: CPTII,S$GLB,, | Performed by: COLON & RECTAL SURGERY

## 2023-06-12 PROCEDURE — 1126F AMNT PAIN NOTED NONE PRSNT: CPT | Mod: CPTII,S$GLB,, | Performed by: COLON & RECTAL SURGERY

## 2023-06-12 PROCEDURE — 99024 POSTOP FOLLOW-UP VISIT: CPT | Mod: S$GLB,,, | Performed by: COLON & RECTAL SURGERY

## 2023-06-12 PROCEDURE — 74177 CT ABDOMEN PELVIS WITH CONTRAST: ICD-10-PCS | Mod: 26,,, | Performed by: RADIOLOGY

## 2023-06-12 PROCEDURE — 3008F BODY MASS INDEX DOCD: CPT | Mod: CPTII,S$GLB,, | Performed by: COLON & RECTAL SURGERY

## 2023-06-12 RX ADMIN — IOHEXOL 100 ML: 350 INJECTION, SOLUTION INTRAVENOUS at 11:06

## 2023-06-12 RX ADMIN — IOHEXOL 60 ML: 350 INJECTION, SOLUTION INTRAVENOUS at 11:06

## 2023-06-12 RX ADMIN — IOHEXOL 1000 ML: 12 SOLUTION ORAL at 11:06
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
## 2023-06-15 DIAGNOSIS — C20 RECTAL CANCER: Primary | ICD-10-CM

## 2023-06-18 ENCOUNTER — PATIENT MESSAGE (OUTPATIENT)
Dept: SURGERY | Facility: HOSPITAL | Age: 68
End: 2023-06-18
Payer: MEDICARE

## 2023-06-19 NOTE — PROVATION PATIENT INSTRUCTIONS
Discharge Summary/Instructions after an Endoscopic Procedure  Patient Name: Edgar Mason  Patient MRN: [id number]  Patient YOB: 1955  Monday, May 29, 2023  West Hong MD  Dear patient,  As a result of recent federal legislation (The Federal Cures Act), you may   receive lab or pathology results from your procedure in your [facility]   account before your physician is able to contact you. Your physician or   their representative will relay the results to you with their   recommendations at their soonest availability.  Thank you,  RESTRICTIONS:  During your procedure today, you received medications for sedation.  These   medications may affect your judgment, balance and coordination.  Therefore,   for 24 hours, you have the following restrictions:   - DO NOT drive a car, operate machinery, make legal/financial decisions,   sign important papers or drink alcohol.    ACTIVITY:  Today: no heavy lifting, straining or running due to procedural   sedation/anesthesia.  The following day: return to full activity including work.  DIET:  Eat and drink normally unless instructed otherwise.     TREATMENT FOR COMMON SIDE EFFECTS:  - Mild abdominal pain, nausea, belching, bloating or excessive gas:  rest,   eat lightly and use a heating pad.  - Sore Throat: treat with throat lozenges and/or gargle with warm salt   water.  - Because air was used during the procedure, expelling large amounts of air   from your rectum or belching is normal.  - If a bowel prep was taken, you may not have a bowel movement for 1-3 days.    This is normal.  SYMPTOMS TO WATCH FOR AND REPORT TO YOUR PHYSICIAN:  1. Abdominal pain or bloating, other than gas cramps.  2. Chest pain.  3. Back pain.  4. Signs of infection such as: chills or fever occurring within 24 hours   after the procedure.  5. Rectal bleeding, which would show as bright red, maroon, or black stools.   (A tablespoon of blood from the rectum is not serious, especially if   hemorrhoids  are present.)  6. Vomiting.  7. Weakness or dizziness.  GO DIRECTLY TO THE NEAREST EMERGENCY ROOM IF YOU HAVE ANY OF THE FOLLOWING:      Difficulty breathing              Chills and/or fever over 101 F   Persistent vomiting and/or vomiting blood   Severe abdominal pain   Severe chest pain   Black, tarry stools   Bleeding- more than one tablespoon   Any other symptom or condition that you feel may need urgent attention  Your doctor recommends these additional instructions:  If any biopsies were taken, your doctors clinic will contact you in 1 to 2   weeks with any results.  You are being discharged to home.   You have a contact number available for emergencies.  The signs and symptoms   of potential delayed complications were discussed with you.  You may return   to normal activities tomorrow.  Written discharge instructions were   provided to you.   Resume your previous diet.  For questions, problems or results please call your physician - West Hong MD at Work:  [phone].  EMERGENCY PHONE NUMBER: [phone], LAB RESULTS: [phone]  IF A COMPLICATION OR EMERGENCY SITUATION ARISES AND YOU ARE UNABLE TO REACH   YOUR PHYSICIAN - GO DIRECTLY TO THE EMERGENCY ROOM.  ___________________________________________  Nurse Signature  ___________________________________________  Patient/Designated Responsible Party Signature  West Hong MD  6/19/2023 2:17:35 PM  This report has been verified and signed electronically.  Dear patient,  As a result of recent federal legislation (The Federal Cures Act), you may   receive lab or pathology results from your procedure in your [facility]   account before your physician is able to contact you. Your physician or   their representative will relay the results to you with their   recommendations at their soonest availability.  Thank you.  PROVATION

## 2023-06-20 ENCOUNTER — CLINICAL SUPPORT (OUTPATIENT)
Dept: CARDIOLOGY | Facility: HOSPITAL | Age: 68
End: 2023-06-20
Attending: INTERNAL MEDICINE
Payer: MEDICARE

## 2023-06-20 ENCOUNTER — HOSPITAL ENCOUNTER (OUTPATIENT)
Dept: RADIOLOGY | Facility: HOSPITAL | Age: 68
Discharge: HOME | End: 2023-06-20
Attending: INTERNAL MEDICINE
Payer: MEDICARE

## 2023-06-20 VITALS
BODY MASS INDEX: 30.75 KG/M2 | HEIGHT: 73 IN | DIASTOLIC BLOOD PRESSURE: 60 MMHG | WEIGHT: 232 LBS | SYSTOLIC BLOOD PRESSURE: 126 MMHG

## 2023-06-20 DIAGNOSIS — R03.0 ELEVATED BLOOD PRESSURE READING IN OFFICE WITHOUT DIAGNOSIS OF HYPERTENSION: ICD-10-CM

## 2023-06-20 DIAGNOSIS — E11.65 UNCONTROLLED TYPE 2 DIABETES MELLITUS WITH HYPERGLYCEMIA: ICD-10-CM

## 2023-06-20 DIAGNOSIS — R60.0 BILATERAL LOWER EXTREMITY EDEMA: ICD-10-CM

## 2023-06-20 DIAGNOSIS — I87.2 VENOUS STASIS DERMATITIS OF LEFT LOWER EXTREMITY: ICD-10-CM

## 2023-06-20 DIAGNOSIS — G47.33 OSA (OBSTRUCTIVE SLEEP APNEA): ICD-10-CM

## 2023-06-20 DIAGNOSIS — H35.033 HYPERTENSIVE RETINOPATHY OF BOTH EYES: ICD-10-CM

## 2023-06-20 DIAGNOSIS — E78.5 DYSLIPIDEMIA: ICD-10-CM

## 2023-06-20 DIAGNOSIS — G62.9 NEUROPATHY: ICD-10-CM

## 2023-06-20 DIAGNOSIS — I65.23 BILATERAL CAROTID ARTERY STENOSIS: ICD-10-CM

## 2023-06-20 LAB
ASCENDING AORTA: 2.99 CM
AV INDEX (PROSTH): 0.78
AV MEAN GRADIENT: 5 MMHG
AV PEAK GRADIENT: 10 MMHG
AV VALVE AREA: 2.61 CM2
AV VELOCITY RATIO: 0.72
BSA FOR ECHO PROCEDURE: 2.33 M2
CV ECHO LV RWT: 0.58 CM
DOP CALC AO PEAK VEL: 1.55 M/S
DOP CALC AO VTI: 33.6 CM
DOP CALC LVOT AREA: 3.3 CM2
DOP CALC LVOT DIAMETER: 2.06 CM
DOP CALC LVOT PEAK VEL: 1.11 M/S
DOP CALC LVOT STROKE VOLUME: 87.61 CM3
DOP CALCLVOT PEAK VEL VTI: 26.3 CM
E WAVE DECELERATION TIME: 267.56 MSEC
E/A RATIO: 1.19
E/E' RATIO: 9.2 M/S
ECHO LV POSTERIOR WALL: 1.03 CM (ref 0.6–1.1)
EJECTION FRACTION: 65 %
FRACTIONAL SHORTENING: 41 % (ref 28–44)
INTERVENTRICULAR SEPTUM: 1 CM (ref 0.6–1.1)
IVRT: 74.22 MSEC
LA MAJOR: 5.97 CM
LA MINOR: 5.94 CM
LA WIDTH: 4.1 CM
LEFT ATRIUM SIZE: 3.59 CM
LEFT ATRIUM VOLUME INDEX: 32.5 ML/M2
LEFT ATRIUM VOLUME: 74.5 CM3
LEFT INTERNAL DIMENSION IN SYSTOLE: 2.1 CM (ref 2.1–4)
LEFT VENTRICLE DIASTOLIC VOLUME INDEX: 23.41 ML/M2
LEFT VENTRICLE DIASTOLIC VOLUME: 53.6 ML
LEFT VENTRICLE MASS INDEX: 48 G/M2
LEFT VENTRICLE SYSTOLIC VOLUME INDEX: 6.3 ML/M2
LEFT VENTRICLE SYSTOLIC VOLUME: 14.34 ML
LEFT VENTRICULAR INTERNAL DIMENSION IN DIASTOLE: 3.58 CM (ref 3.5–6)
LEFT VENTRICULAR MASS: 109.32 G
LV LATERAL E/E' RATIO: 8.36 M/S
LV SEPTAL E/E' RATIO: 10.22 M/S
LVOT MG: 2.79 MMHG
LVOT MV: 0.8 CM/S
MV PEAK A VEL: 0.77 M/S
MV PEAK E VEL: 0.92 M/S
MV STENOSIS PRESSURE HALF TIME: 77.59 MS
MV VALVE AREA P 1/2 METHOD: 2.84 CM2
PISA TR MAX VEL: 2.55 M/S
PULM VEIN S/D RATIO: 1.37
PV PEAK D VEL: 0.52 M/S
PV PEAK S VEL: 0.71 M/S
RA MAJOR: 5.38 CM
RA PRESSURE: 3 MMHG
RA WIDTH: 2.3 CM
RIGHT VENTRICULAR END-DIASTOLIC DIMENSION: 3.05 CM
RIGHT VENTRICULAR LENGTH IN DIASTOLE (APICAL 4-CHAMBER VIEW): 7.85 CM
RV MID DIAMA: 2.12 CM
RV TISSUE DOPPLER FREE WALL SYSTOLIC VELOCITY 1 (APICAL 4 CHAMBER VIEW): 22.42 CM/S
SINUS: 2.97 CM
STJ: 2.95 CM
TDI LATERAL: 0.11 M/S
TDI SEPTAL: 0.09 M/S
TDI: 0.1 M/S
TR MAX PG: 26 MMHG
TRICUSPID ANNULAR PLANE SYSTOLIC EXCURSION: 2.45 CM
TV REST PULMONARY ARTERY PRESSURE: 29 MMHG

## 2023-06-20 PROCEDURE — 93970 US LOWER EXTREMITY VEINS BILATERAL: ICD-10-PCS | Mod: 26,,, | Performed by: RADIOLOGY

## 2023-06-20 PROCEDURE — 93970 EXTREMITY STUDY: CPT | Mod: TC,PO

## 2023-06-20 PROCEDURE — 93306 TTE W/DOPPLER COMPLETE: CPT | Mod: 26,,, | Performed by: INTERNAL MEDICINE

## 2023-06-20 PROCEDURE — 93306 ECHO (CUPID ONLY): ICD-10-PCS | Mod: 26,,, | Performed by: INTERNAL MEDICINE

## 2023-06-20 PROCEDURE — 93970 EXTREMITY STUDY: CPT | Mod: 26,,, | Performed by: RADIOLOGY

## 2023-06-20 PROCEDURE — 93306 TTE W/DOPPLER COMPLETE: CPT | Mod: PO

## 2023-06-21 ENCOUNTER — PATIENT MESSAGE (OUTPATIENT)
Dept: SURGERY | Facility: HOSPITAL | Age: 68
End: 2023-06-21
Payer: MEDICARE

## 2023-07-03 ENCOUNTER — TELEPHONE (OUTPATIENT)
Dept: CARDIOLOGY | Facility: CLINIC | Age: 68
End: 2023-07-03
Payer: MEDICARE

## 2023-07-03 NOTE — TELEPHONE ENCOUNTER
----- Message from Elizabeth Milner sent at 7/3/2023  4:37 PM CDT -----  Type: Needs Medical Advice  Who Called:  pt wife keira   Glenn Call Back Number: [phone]    Additional Information: requesting a call back about rx please advise thank you

## 2023-07-03 NOTE — TELEPHONE ENCOUNTER
Please advise: wife is asking how serious the blood clot in his leg is  Also how long will he have to take Eliquis  Also would he be able to hold it during that time period for a surgery

## 2023-07-11 ENCOUNTER — OFFICE VISIT (OUTPATIENT)
Dept: CARDIOLOGY | Facility: CLINIC | Age: 68
End: 2023-07-11
Payer: MEDICARE

## 2023-07-11 VITALS
HEIGHT: 73 IN | BODY MASS INDEX: 30.97 KG/M2 | DIASTOLIC BLOOD PRESSURE: 67 MMHG | HEART RATE: 84 BPM | SYSTOLIC BLOOD PRESSURE: 129 MMHG | WEIGHT: 233.69 LBS

## 2023-07-11 DIAGNOSIS — I65.23 BILATERAL CAROTID ARTERY STENOSIS: ICD-10-CM

## 2023-07-11 DIAGNOSIS — G47.33 OSA (OBSTRUCTIVE SLEEP APNEA): ICD-10-CM

## 2023-07-11 DIAGNOSIS — R60.0 BILATERAL LOWER EXTREMITY EDEMA: ICD-10-CM

## 2023-07-11 DIAGNOSIS — E11.3293 CONTROLLED TYPE 2 DIABETES MELLITUS WITH BOTH EYES AFFECTED BY MILD NONPROLIFERATIVE RETINOPATHY WITHOUT MACULAR EDEMA, WITHOUT LONG-TERM CURRENT USE OF INSULIN: ICD-10-CM

## 2023-07-11 DIAGNOSIS — I87.2 VENOUS STASIS DERMATITIS OF LEFT LOWER EXTREMITY: ICD-10-CM

## 2023-07-11 DIAGNOSIS — E11.65 UNCONTROLLED TYPE 2 DIABETES MELLITUS WITH HYPERGLYCEMIA: ICD-10-CM

## 2023-07-11 DIAGNOSIS — D50.8 OTHER IRON DEFICIENCY ANEMIA: ICD-10-CM

## 2023-07-11 DIAGNOSIS — Z01.818 PRE-OP EVALUATION: ICD-10-CM

## 2023-07-11 DIAGNOSIS — E78.5 DYSLIPIDEMIA: Primary | ICD-10-CM

## 2023-07-11 PROCEDURE — 3046F HEMOGLOBIN A1C LEVEL >9.0%: CPT | Mod: CPTII,S$GLB,, | Performed by: INTERNAL MEDICINE

## 2023-07-11 PROCEDURE — 99214 PR OFFICE/OUTPT VISIT, EST, LEVL IV, 30-39 MIN: ICD-10-PCS | Mod: S$GLB,,, | Performed by: INTERNAL MEDICINE

## 2023-07-11 PROCEDURE — 1159F PR MEDICATION LIST DOCUMENTED IN MEDICAL RECORD: ICD-10-PCS | Mod: CPTII,S$GLB,, | Performed by: INTERNAL MEDICINE

## 2023-07-11 PROCEDURE — 3078F PR MOST RECENT DIASTOLIC BLOOD PRESSURE < 80 MM HG: ICD-10-PCS | Mod: CPTII,S$GLB,, | Performed by: INTERNAL MEDICINE

## 2023-07-11 PROCEDURE — 3008F PR BODY MASS INDEX (BMI) DOCUMENTED: ICD-10-PCS | Mod: CPTII,S$GLB,, | Performed by: INTERNAL MEDICINE

## 2023-07-11 PROCEDURE — 3074F PR MOST RECENT SYSTOLIC BLOOD PRESSURE < 130 MM HG: ICD-10-PCS | Mod: CPTII,S$GLB,, | Performed by: INTERNAL MEDICINE

## 2023-07-11 PROCEDURE — 99999 PR PBB SHADOW E&M-EST. PATIENT-LVL III: ICD-10-PCS | Mod: PBBFAC,,, | Performed by: INTERNAL MEDICINE

## 2023-07-11 PROCEDURE — 99214 OFFICE O/P EST MOD 30 MIN: CPT | Mod: S$GLB,,, | Performed by: INTERNAL MEDICINE

## 2023-07-11 PROCEDURE — 3078F DIAST BP <80 MM HG: CPT | Mod: CPTII,S$GLB,, | Performed by: INTERNAL MEDICINE

## 2023-07-11 PROCEDURE — 1101F PT FALLS ASSESS-DOCD LE1/YR: CPT | Mod: CPTII,S$GLB,, | Performed by: INTERNAL MEDICINE

## 2023-07-11 PROCEDURE — 3074F SYST BP LT 130 MM HG: CPT | Mod: CPTII,S$GLB,, | Performed by: INTERNAL MEDICINE

## 2023-07-11 PROCEDURE — 1126F AMNT PAIN NOTED NONE PRSNT: CPT | Mod: CPTII,S$GLB,, | Performed by: INTERNAL MEDICINE

## 2023-07-11 PROCEDURE — 99999 PR PBB SHADOW E&M-EST. PATIENT-LVL III: CPT | Mod: PBBFAC,,, | Performed by: INTERNAL MEDICINE

## 2023-07-11 PROCEDURE — 3288F PR FALLS RISK ASSESSMENT DOCUMENTED: ICD-10-PCS | Mod: CPTII,S$GLB,, | Performed by: INTERNAL MEDICINE

## 2023-07-11 PROCEDURE — 3046F PR MOST RECENT HEMOGLOBIN A1C LEVEL > 9.0%: ICD-10-PCS | Mod: CPTII,S$GLB,, | Performed by: INTERNAL MEDICINE

## 2023-07-11 PROCEDURE — 1159F MED LIST DOCD IN RCRD: CPT | Mod: CPTII,S$GLB,, | Performed by: INTERNAL MEDICINE

## 2023-07-11 PROCEDURE — 3008F BODY MASS INDEX DOCD: CPT | Mod: CPTII,S$GLB,, | Performed by: INTERNAL MEDICINE

## 2023-07-11 PROCEDURE — 1126F PR PAIN SEVERITY QUANTIFIED, NO PAIN PRESENT: ICD-10-PCS | Mod: CPTII,S$GLB,, | Performed by: INTERNAL MEDICINE

## 2023-07-11 PROCEDURE — 1101F PR PT FALLS ASSESS DOC 0-1 FALLS W/OUT INJ PAST YR: ICD-10-PCS | Mod: CPTII,S$GLB,, | Performed by: INTERNAL MEDICINE

## 2023-07-11 PROCEDURE — 3288F FALL RISK ASSESSMENT DOCD: CPT | Mod: CPTII,S$GLB,, | Performed by: INTERNAL MEDICINE
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
## 2023-07-21 ENCOUNTER — TELEPHONE (OUTPATIENT)
Dept: CARDIOLOGY | Facility: CLINIC | Age: 68
End: 2023-07-21
Payer: MEDICARE

## 2023-07-21 DIAGNOSIS — I82.409 DEEP VEIN THROMBOSIS (DVT) OF LOWER EXTREMITY, UNSPECIFIED CHRONICITY, UNSPECIFIED LATERALITY, UNSPECIFIED VEIN: Primary | ICD-10-CM

## 2023-07-21 NOTE — TELEPHONE ENCOUNTER
"----- Message from Nayeli Meyers sent at 7/21/2023  3:50 PM CDT -----  Regarding: advice  Contact: wife  Type: Needs Medical Advice  Who Called:  Patient wife // Maria Guadalupe   Symptoms (please be specific):    How long has patient had these symptoms:    Pharmacy name and phone #:    [facility] Call Back Number: [phone] (work) [phone]     Additional Information: Patient wife stated that she was calling concerning medication, " Eliquis". Patient wife stated that pharmacy is in need of a prior authorization. Please contact patient to advise. Thanks!       "

## 2023-09-22 ENCOUNTER — PATIENT MESSAGE (OUTPATIENT)
Dept: CARDIOLOGY | Facility: CLINIC | Age: 68
End: 2023-09-22
Payer: MEDICARE

## 2023-10-02 ENCOUNTER — HOSPITAL ENCOUNTER (OUTPATIENT)
Dept: RADIOLOGY | Facility: HOSPITAL | Age: 68
Discharge: HOME | End: 2023-10-02
Attending: INTERNAL MEDICINE
Payer: MEDICARE

## 2023-10-02 DIAGNOSIS — G47.33 OSA (OBSTRUCTIVE SLEEP APNEA): ICD-10-CM

## 2023-10-02 DIAGNOSIS — D50.8 OTHER IRON DEFICIENCY ANEMIA: ICD-10-CM

## 2023-10-02 DIAGNOSIS — I65.23 BILATERAL CAROTID ARTERY STENOSIS: ICD-10-CM

## 2023-10-02 DIAGNOSIS — E11.3293 CONTROLLED TYPE 2 DIABETES MELLITUS WITH BOTH EYES AFFECTED BY MILD NONPROLIFERATIVE RETINOPATHY WITHOUT MACULAR EDEMA, WITHOUT LONG-TERM CURRENT USE OF INSULIN: ICD-10-CM

## 2023-10-02 DIAGNOSIS — R60.0 BILATERAL LOWER EXTREMITY EDEMA: ICD-10-CM

## 2023-10-02 DIAGNOSIS — E11.65 UNCONTROLLED TYPE 2 DIABETES MELLITUS WITH HYPERGLYCEMIA: ICD-10-CM

## 2023-10-02 DIAGNOSIS — E78.5 DYSLIPIDEMIA: ICD-10-CM

## 2023-10-02 DIAGNOSIS — I87.2 VENOUS STASIS DERMATITIS OF LEFT LOWER EXTREMITY: ICD-10-CM

## 2023-10-02 DIAGNOSIS — Z01.818 PRE-OP EVALUATION: ICD-10-CM

## 2023-10-02 PROCEDURE — 93970 EXTREMITY STUDY: CPT | Mod: 26,,, | Performed by: RADIOLOGY

## 2023-10-02 PROCEDURE — 93970 EXTREMITY STUDY: CPT | Mod: TC,PO

## 2023-10-02 PROCEDURE — 93970 US LOWER EXTREMITY VEINS BILATERAL: ICD-10-PCS | Mod: 26,,, | Performed by: RADIOLOGY

## 2023-10-11 ENCOUNTER — OFFICE VISIT (OUTPATIENT)
Dept: CARDIOLOGY | Facility: CLINIC | Age: 68
End: 2023-10-11
Payer: MEDICARE

## 2023-10-11 VITALS
BODY MASS INDEX: 32.67 KG/M2 | HEIGHT: 73 IN | RESPIRATION RATE: 18 BRPM | DIASTOLIC BLOOD PRESSURE: 79 MMHG | WEIGHT: 246.5 LBS | HEART RATE: 73 BPM | SYSTOLIC BLOOD PRESSURE: 130 MMHG

## 2023-10-11 DIAGNOSIS — I87.2 VENOUS STASIS DERMATITIS OF LEFT LOWER EXTREMITY: ICD-10-CM

## 2023-10-11 DIAGNOSIS — Z01.818 PRE-OP EVALUATION: ICD-10-CM

## 2023-10-11 DIAGNOSIS — R60.0 BILATERAL LOWER EXTREMITY EDEMA: ICD-10-CM

## 2023-10-11 DIAGNOSIS — I77.9 CAROTID ARTERY DISEASE, UNSPECIFIED LATERALITY, UNSPECIFIED TYPE: ICD-10-CM

## 2023-10-11 DIAGNOSIS — C20 RECTAL CANCER: ICD-10-CM

## 2023-10-11 DIAGNOSIS — E78.5 DYSLIPIDEMIA: Primary | ICD-10-CM

## 2023-10-11 DIAGNOSIS — G47.33 OSA (OBSTRUCTIVE SLEEP APNEA): ICD-10-CM

## 2023-10-11 PROCEDURE — 3288F PR FALLS RISK ASSESSMENT DOCUMENTED: ICD-10-PCS | Mod: CPTII,S$GLB,, | Performed by: INTERNAL MEDICINE

## 2023-10-11 PROCEDURE — 99214 OFFICE O/P EST MOD 30 MIN: CPT | Mod: S$GLB,,, | Performed by: INTERNAL MEDICINE

## 2023-10-11 PROCEDURE — 1159F PR MEDICATION LIST DOCUMENTED IN MEDICAL RECORD: ICD-10-PCS | Mod: CPTII,S$GLB,, | Performed by: INTERNAL MEDICINE

## 2023-10-11 PROCEDURE — 3046F PR MOST RECENT HEMOGLOBIN A1C LEVEL > 9.0%: ICD-10-PCS | Mod: CPTII,S$GLB,, | Performed by: INTERNAL MEDICINE

## 2023-10-11 PROCEDURE — 3078F DIAST BP <80 MM HG: CPT | Mod: CPTII,S$GLB,, | Performed by: INTERNAL MEDICINE

## 2023-10-11 PROCEDURE — 3008F PR BODY MASS INDEX (BMI) DOCUMENTED: ICD-10-PCS | Mod: CPTII,S$GLB,, | Performed by: INTERNAL MEDICINE

## 2023-10-11 PROCEDURE — 3075F SYST BP GE 130 - 139MM HG: CPT | Mod: CPTII,S$GLB,, | Performed by: INTERNAL MEDICINE

## 2023-10-11 PROCEDURE — 3008F BODY MASS INDEX DOCD: CPT | Mod: CPTII,S$GLB,, | Performed by: INTERNAL MEDICINE

## 2023-10-11 PROCEDURE — 99999 PR PBB SHADOW E&M-EST. PATIENT-LVL III: ICD-10-PCS | Mod: PBBFAC,,, | Performed by: INTERNAL MEDICINE

## 2023-10-11 PROCEDURE — 1101F PR PT FALLS ASSESS DOC 0-1 FALLS W/OUT INJ PAST YR: ICD-10-PCS | Mod: CPTII,S$GLB,, | Performed by: INTERNAL MEDICINE

## 2023-10-11 PROCEDURE — 1101F PT FALLS ASSESS-DOCD LE1/YR: CPT | Mod: CPTII,S$GLB,, | Performed by: INTERNAL MEDICINE

## 2023-10-11 PROCEDURE — 1125F PR PAIN SEVERITY QUANTIFIED, PAIN PRESENT: ICD-10-PCS | Mod: CPTII,S$GLB,, | Performed by: INTERNAL MEDICINE

## 2023-10-11 PROCEDURE — 99214 PR OFFICE/OUTPT VISIT, EST, LEVL IV, 30-39 MIN: ICD-10-PCS | Mod: S$GLB,,, | Performed by: INTERNAL MEDICINE

## 2023-10-11 PROCEDURE — 3288F FALL RISK ASSESSMENT DOCD: CPT | Mod: CPTII,S$GLB,, | Performed by: INTERNAL MEDICINE

## 2023-10-11 PROCEDURE — 1125F AMNT PAIN NOTED PAIN PRSNT: CPT | Mod: CPTII,S$GLB,, | Performed by: INTERNAL MEDICINE

## 2023-10-11 PROCEDURE — 3078F PR MOST RECENT DIASTOLIC BLOOD PRESSURE < 80 MM HG: ICD-10-PCS | Mod: CPTII,S$GLB,, | Performed by: INTERNAL MEDICINE

## 2023-10-11 PROCEDURE — 3046F HEMOGLOBIN A1C LEVEL >9.0%: CPT | Mod: CPTII,S$GLB,, | Performed by: INTERNAL MEDICINE

## 2023-10-11 PROCEDURE — 99999 PR PBB SHADOW E&M-EST. PATIENT-LVL III: CPT | Mod: PBBFAC,,, | Performed by: INTERNAL MEDICINE

## 2023-10-11 PROCEDURE — 1159F MED LIST DOCD IN RCRD: CPT | Mod: CPTII,S$GLB,, | Performed by: INTERNAL MEDICINE

## 2023-10-11 PROCEDURE — 3075F PR MOST RECENT SYSTOLIC BLOOD PRESS GE 130-139MM HG: ICD-10-PCS | Mod: CPTII,S$GLB,, | Performed by: INTERNAL MEDICINE
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
## 2023-10-16 ENCOUNTER — PATIENT MESSAGE (OUTPATIENT)
Dept: SURGERY | Facility: CLINIC | Age: 68
End: 2023-10-16

## 2023-10-17 ENCOUNTER — TELEPHONE (OUTPATIENT)
Dept: SURGERY | Facility: CLINIC | Age: 68
End: 2023-10-17
Payer: MEDICARE

## 2023-10-17 NOTE — TELEPHONE ENCOUNTER
"Spoke with pt regarding request to follow up with Dr. Hong to discuss reversal. Pt states, "he was cleared by his cardiologist and other providers." Informed that if needed, we will request information from providers directly but should have enough to discuss surgery and decide on a date. Pt verbalized understanding. Denies further questions at this time.         ----- Message from Grace Villa RN sent at 10/17/2023  4:28 PM CDT -----  Regarding: FW: appt access  Contact: pt [phone]    ----- Message -----  From: Artemio Torres  Sent: 10/17/2023   4:27 PM CDT  To: [facility] Staff  Subject: appt access                                      PATIENT RETURNING CALL    Pt returned Maria Eugenia's call regarding scheduling. He'd like to setup a consultation for a reverse ileostomy. Please call pt at [phone] or [phone]      "

## 2023-10-17 NOTE — TELEPHONE ENCOUNTER
Attempted to contact pt regarding missed appt on 10/16. No answer. Left message to return call to reschedule. CRS number provided to return call.       ----- Message from Bill Thorpe sent at 10/17/2023  2:24 PM CDT -----  Contact: patient  Type:  Patient Returning Call    Who Called:patient   Does the patient know what this is regarding?:appt missed on 10/16/2023  Would the patient rather a call back or a response via [facility]? Call back   Best Call Back Number:[phone]  Additional Information: pt stated that he would like to speak to physician in regards to the missed appt on 10/16/2023 please assist

## 2023-10-18 ENCOUNTER — TELEPHONE (OUTPATIENT)
Dept: SURGERY | Facility: CLINIC | Age: 68
End: 2023-10-18
Payer: MEDICARE

## 2023-10-18 NOTE — TELEPHONE ENCOUNTER
Called pt to discuss surgery dates. Pt would like to schedule surgery on 11/2/23. His cardiologist has given him the ok to proceed with surgery.

## 2023-10-23 DIAGNOSIS — Z93.2 ILEOSTOMY IN PLACE: Primary | ICD-10-CM

## 2023-10-26 ENCOUNTER — TELEPHONE (OUTPATIENT)
Dept: SURGERY | Facility: CLINIC | Age: 68
End: 2023-10-26
Payer: MEDICARE

## 2023-10-30 NOTE — PRE-PROCEDURE INSTRUCTIONS
PreOp Instructions given:   - Verbal medication information (what to hold and what to take)   - NPO guidelines and bowel prep instructions given per CRS Dept  - Arrival place directions given; time to be given the day before procedure by the   Surgeon's Office DOSC  - Bathing with antibacterial soap   - Don't wear any jewelry or bring any valuables AM of surgery   - No makeup or moisturizer to face   - No perfume/cologne, powder, lotions or aftershave   Pt. verbalized understanding.   Pt denies any h/o Anesthesia/Sedation complications or side effects.  Patient does not know arrival time.  Explained that this information comes from the surgeon's office and if they haven't heard from them by 2 or 3 pm to call the office.  Patient stated an understanding.

## 2023-10-31 ENCOUNTER — PATIENT MESSAGE (OUTPATIENT)
Dept: HEMATOLOGY/ONCOLOGY | Facility: CLINIC | Age: 68
End: 2023-10-31
Payer: MEDICARE

## 2023-11-01 DIAGNOSIS — I82.409 DEEP VEIN THROMBOSIS (DVT) OF LOWER EXTREMITY, UNSPECIFIED CHRONICITY, UNSPECIFIED LATERALITY, UNSPECIFIED VEIN: ICD-10-CM

## 2023-11-01 RX ORDER — APIXABAN 5 MG/1
5 TABLET, FILM COATED ORAL 2 TIMES DAILY
Qty: 60 TABLET | Refills: 2 | Status: SHIPPED | OUTPATIENT
Start: 2023-11-01 | End: 2024-02-28

## 2023-11-01 RX ORDER — METRONIDAZOLE 500 MG/1
500 TABLET ORAL 2 TIMES DAILY
Qty: 2 TABLET | Refills: 0 | Status: ON HOLD | OUTPATIENT
Start: 2023-11-01 | End: 2023-11-03 | Stop reason: HOSPADM

## 2023-11-01 RX ORDER — CIPROFLOXACIN 500 MG/1
500 TABLET ORAL 2 TIMES DAILY
Qty: 2 TABLET | Refills: 0 | Status: SHIPPED | OUTPATIENT
Start: 2023-11-01 | End: 2024-02-28 | Stop reason: ALTCHOICE

## 2023-11-02 ENCOUNTER — ANESTHESIA EVENT (OUTPATIENT)
Dept: SURGERY | Facility: HOSPITAL | Age: 68
DRG: 331 | End: 2023-11-02
Payer: MEDICARE

## 2023-11-02 ENCOUNTER — HOSPITAL ENCOUNTER (INPATIENT)
Facility: HOSPITAL | Age: 68
LOS: 1 days | Discharge: HOME | DRG: 331 | End: 2023-11-03
Attending: COLON & RECTAL SURGERY | Admitting: COLON & RECTAL SURGERY
Payer: MEDICARE

## 2023-11-02 ENCOUNTER — ANESTHESIA (OUTPATIENT)
Dept: SURGERY | Facility: HOSPITAL | Age: 68
DRG: 331 | End: 2023-11-02
Payer: MEDICARE

## 2023-11-02 DIAGNOSIS — Z93.2 ILEOSTOMY IN PLACE: Primary | ICD-10-CM

## 2023-11-02 DIAGNOSIS — C20 RECTAL CANCER: ICD-10-CM

## 2023-11-02 LAB
ABO + RH BLD: NORMAL
BLD GP AB SCN CELLS X3 SERPL QL: NORMAL
POCT GLUCOSE: 166 MG/DL (ref 70–110)
POCT GLUCOSE: 173 MG/DL (ref 70–110)
POCT GLUCOSE: 310 MG/DL (ref 70–110)
SPECIMEN OUTDATE: NORMAL

## 2023-11-02 PROCEDURE — 63600175 PHARM REV CODE 636 W HCPCS: Performed by: STUDENT IN AN ORGANIZED HEALTH CARE EDUCATION/TRAINING PROGRAM

## 2023-11-02 PROCEDURE — 25000003 PHARM REV CODE 250: Performed by: NURSE PRACTITIONER

## 2023-11-02 PROCEDURE — 71000033 HC RECOVERY, INTIAL HOUR: Performed by: COLON & RECTAL SURGERY

## 2023-11-02 PROCEDURE — 63600175 PHARM REV CODE 636 W HCPCS: Performed by: NURSE PRACTITIONER

## 2023-11-02 PROCEDURE — 86901 BLOOD TYPING SEROLOGIC RH(D): CPT | Performed by: NURSE PRACTITIONER

## 2023-11-02 PROCEDURE — 25000003 PHARM REV CODE 250: Performed by: STUDENT IN AN ORGANIZED HEALTH CARE EDUCATION/TRAINING PROGRAM

## 2023-11-02 PROCEDURE — 36000709 HC OR TIME LEV III EA ADD 15 MIN: Performed by: COLON & RECTAL SURGERY

## 2023-11-02 PROCEDURE — D9220A PRA ANESTHESIA: Mod: CRNA,,, | Performed by: NURSE ANESTHETIST, CERTIFIED REGISTERED

## 2023-11-02 PROCEDURE — 36000708 HC OR TIME LEV III 1ST 15 MIN: Performed by: COLON & RECTAL SURGERY

## 2023-11-02 PROCEDURE — D9220A PRA ANESTHESIA: ICD-10-PCS | Mod: ANES,,, | Performed by: ANESTHESIOLOGY

## 2023-11-02 PROCEDURE — 25000003 PHARM REV CODE 250: Performed by: NURSE ANESTHETIST, CERTIFIED REGISTERED

## 2023-11-02 PROCEDURE — 71000016 HC POSTOP RECOV ADDL HR: Performed by: COLON & RECTAL SURGERY

## 2023-11-02 PROCEDURE — 25000003 PHARM REV CODE 250: Performed by: COLON & RECTAL SURGERY

## 2023-11-02 PROCEDURE — 94761 N-INVAS EAR/PLS OXIMETRY MLT: CPT

## 2023-11-02 PROCEDURE — D9220A PRA ANESTHESIA: ICD-10-PCS | Mod: CRNA,,, | Performed by: NURSE ANESTHETIST, CERTIFIED REGISTERED

## 2023-11-02 PROCEDURE — 20600001 HC STEP DOWN PRIVATE ROOM

## 2023-11-02 PROCEDURE — D9220A PRA ANESTHESIA: Mod: ANES,,, | Performed by: ANESTHESIOLOGY

## 2023-11-02 PROCEDURE — 44620 REPAIR BOWEL OPENING: CPT | Mod: ,,, | Performed by: COLON & RECTAL SURGERY

## 2023-11-02 PROCEDURE — 63600175 PHARM REV CODE 636 W HCPCS: Performed by: COLON & RECTAL SURGERY

## 2023-11-02 PROCEDURE — 25000242 PHARM REV CODE 250 ALT 637 W/ HCPCS: Performed by: STUDENT IN AN ORGANIZED HEALTH CARE EDUCATION/TRAINING PROGRAM

## 2023-11-02 PROCEDURE — 37000009 HC ANESTHESIA EA ADD 15 MINS: Performed by: COLON & RECTAL SURGERY

## 2023-11-02 PROCEDURE — 44620 PR CLOSE ENTEROSTOMY: ICD-10-PCS | Mod: ,,, | Performed by: COLON & RECTAL SURGERY

## 2023-11-02 PROCEDURE — 82962 GLUCOSE BLOOD TEST: CPT | Performed by: COLON & RECTAL SURGERY

## 2023-11-02 PROCEDURE — 37000008 HC ANESTHESIA 1ST 15 MINUTES: Performed by: COLON & RECTAL SURGERY

## 2023-11-02 PROCEDURE — 63600175 PHARM REV CODE 636 W HCPCS: Performed by: NURSE ANESTHETIST, CERTIFIED REGISTERED

## 2023-11-02 PROCEDURE — 71000015 HC POSTOP RECOV 1ST HR: Performed by: COLON & RECTAL SURGERY

## 2023-11-02 RX ORDER — ROCURONIUM BROMIDE 10 MG/ML
INJECTION, SOLUTION INTRAVENOUS
Status: DISCONTINUED | OUTPATIENT
Start: 2023-11-02 | End: 2023-11-02

## 2023-11-02 RX ORDER — HALOPERIDOL 5 MG/ML
0.5 INJECTION INTRAMUSCULAR EVERY 10 MIN PRN
Status: DISCONTINUED | OUTPATIENT
Start: 2023-11-02 | End: 2023-11-02 | Stop reason: HOSPADM

## 2023-11-02 RX ORDER — GABAPENTIN 300 MG/1
300 CAPSULE ORAL
Status: COMPLETED | OUTPATIENT
Start: 2023-11-02 | End: 2023-11-02

## 2023-11-02 RX ORDER — ACETAMINOPHEN 500 MG
1000 TABLET ORAL EVERY 8 HOURS
Status: DISCONTINUED | OUTPATIENT
Start: 2023-11-03 | End: 2023-11-03 | Stop reason: HOSPADM

## 2023-11-02 RX ORDER — ONDANSETRON 2 MG/ML
INJECTION INTRAMUSCULAR; INTRAVENOUS
Status: DISCONTINUED | OUTPATIENT
Start: 2023-11-02 | End: 2023-11-02

## 2023-11-02 RX ORDER — PHENYLEPHRINE HCL IN 0.9% NACL 1 MG/10 ML
SYRINGE (ML) INTRAVENOUS
Status: DISCONTINUED | OUTPATIENT
Start: 2023-11-02 | End: 2023-11-02

## 2023-11-02 RX ORDER — MUPIROCIN 20 MG/G
OINTMENT TOPICAL 2 TIMES DAILY
Status: DISCONTINUED | OUTPATIENT
Start: 2023-11-02 | End: 2023-11-03 | Stop reason: HOSPADM

## 2023-11-02 RX ORDER — ACETAMINOPHEN 650 MG/20.3ML
975 LIQUID ORAL
Status: COMPLETED | OUTPATIENT
Start: 2023-11-02 | End: 2023-11-02

## 2023-11-02 RX ORDER — PANTOPRAZOLE SODIUM 40 MG/1
40 TABLET, DELAYED RELEASE ORAL
Status: DISCONTINUED | OUTPATIENT
Start: 2023-11-03 | End: 2023-11-03 | Stop reason: HOSPADM

## 2023-11-02 RX ORDER — GABAPENTIN 300 MG/1
300 CAPSULE ORAL 3 TIMES DAILY
Status: DISCONTINUED | OUTPATIENT
Start: 2023-11-02 | End: 2023-11-03 | Stop reason: HOSPADM

## 2023-11-02 RX ORDER — DIPHENHYDRAMINE HYDROCHLORIDE 50 MG/ML
25 INJECTION INTRAMUSCULAR; INTRAVENOUS EVERY 6 HOURS PRN
Status: DISCONTINUED | OUTPATIENT
Start: 2023-11-02 | End: 2023-11-02 | Stop reason: HOSPADM

## 2023-11-02 RX ORDER — TRAMADOL HYDROCHLORIDE 50 MG/1
50 TABLET ORAL EVERY 6 HOURS PRN
Status: DISCONTINUED | OUTPATIENT
Start: 2023-11-02 | End: 2023-11-03 | Stop reason: HOSPADM

## 2023-11-02 RX ORDER — IBUPROFEN 200 MG
24 TABLET ORAL
Status: DISCONTINUED | OUTPATIENT
Start: 2023-11-02 | End: 2023-11-03 | Stop reason: HOSPADM

## 2023-11-02 RX ORDER — BUPIVACAINE HYDROCHLORIDE 2.5 MG/ML
INJECTION, SOLUTION EPIDURAL; INFILTRATION; INTRACAUDAL
Status: DISCONTINUED | OUTPATIENT
Start: 2023-11-02 | End: 2023-11-02 | Stop reason: HOSPADM

## 2023-11-02 RX ORDER — ONDANSETRON 2 MG/ML
4 INJECTION INTRAMUSCULAR; INTRAVENOUS EVERY 6 HOURS PRN
Status: DISCONTINUED | OUTPATIENT
Start: 2023-11-02 | End: 2023-11-03 | Stop reason: HOSPADM

## 2023-11-02 RX ORDER — SODIUM CHLORIDE 0.9 % (FLUSH) 0.9 %
3 SYRINGE (ML) INJECTION
Status: DISCONTINUED | OUTPATIENT
Start: 2023-11-02 | End: 2023-11-02 | Stop reason: HOSPADM

## 2023-11-02 RX ORDER — FENTANYL CITRATE 50 UG/ML
INJECTION, SOLUTION INTRAMUSCULAR; INTRAVENOUS
Status: DISCONTINUED | OUTPATIENT
Start: 2023-11-02 | End: 2023-11-02

## 2023-11-02 RX ORDER — ONDANSETRON 2 MG/ML
4 INJECTION INTRAMUSCULAR; INTRAVENOUS ONCE AS NEEDED
Status: DISCONTINUED | OUTPATIENT
Start: 2023-11-02 | End: 2023-11-02 | Stop reason: HOSPADM

## 2023-11-02 RX ORDER — CALCIUM CHLORIDE INJECTION 100 MG/ML
INJECTION, SOLUTION INTRAVENOUS
Status: DISCONTINUED | OUTPATIENT
Start: 2023-11-02 | End: 2023-11-02

## 2023-11-02 RX ORDER — ACETAMINOPHEN 10 MG/ML
1000 INJECTION, SOLUTION INTRAVENOUS EVERY 8 HOURS
Status: COMPLETED | OUTPATIENT
Start: 2023-11-02 | End: 2023-11-03

## 2023-11-02 RX ORDER — FENTANYL CITRATE 50 UG/ML
25 INJECTION, SOLUTION INTRAMUSCULAR; INTRAVENOUS EVERY 5 MIN PRN
Status: DISCONTINUED | OUTPATIENT
Start: 2023-11-02 | End: 2023-11-02 | Stop reason: HOSPADM

## 2023-11-02 RX ORDER — OXYCODONE HYDROCHLORIDE 10 MG/1
10 TABLET ORAL EVERY 4 HOURS PRN
Status: DISCONTINUED | OUTPATIENT
Start: 2023-11-02 | End: 2023-11-03 | Stop reason: HOSPADM

## 2023-11-02 RX ORDER — MUPIROCIN 20 MG/G
1 OINTMENT TOPICAL
Status: COMPLETED | OUTPATIENT
Start: 2023-11-02 | End: 2023-11-02

## 2023-11-02 RX ORDER — TRIPROLIDINE/PSEUDOEPHEDRINE 2.5MG-60MG
600 TABLET ORAL
Status: COMPLETED | OUTPATIENT
Start: 2023-11-02 | End: 2023-11-02

## 2023-11-02 RX ORDER — IBUPROFEN 400 MG/1
800 TABLET ORAL EVERY 8 HOURS
Status: DISCONTINUED | OUTPATIENT
Start: 2023-11-03 | End: 2023-11-03 | Stop reason: HOSPADM

## 2023-11-02 RX ORDER — HEPARIN SODIUM 5000 [USP'U]/ML
5000 INJECTION, SOLUTION INTRAVENOUS; SUBCUTANEOUS EVERY 8 HOURS
Status: COMPLETED | OUTPATIENT
Start: 2023-11-02 | End: 2023-11-02

## 2023-11-02 RX ORDER — INSULIN ASPART 100 [IU]/ML
0-10 INJECTION, SOLUTION INTRAVENOUS; SUBCUTANEOUS
Status: DISCONTINUED | OUTPATIENT
Start: 2023-11-02 | End: 2023-11-03 | Stop reason: HOSPADM

## 2023-11-02 RX ORDER — SODIUM CHLORIDE 9 MG/ML
INJECTION, SOLUTION INTRAVENOUS CONTINUOUS
Status: DISCONTINUED | OUTPATIENT
Start: 2023-11-02 | End: 2023-11-03 | Stop reason: HOSPADM

## 2023-11-02 RX ORDER — HYDROMORPHONE HYDROCHLORIDE 1 MG/ML
0.2 INJECTION, SOLUTION INTRAMUSCULAR; INTRAVENOUS; SUBCUTANEOUS EVERY 5 MIN PRN
Status: DISCONTINUED | OUTPATIENT
Start: 2023-11-02 | End: 2023-11-02 | Stop reason: HOSPADM

## 2023-11-02 RX ORDER — FLUTICASONE PROPIONATE 50 MCG
1 SPRAY, SUSPENSION (ML) NASAL DAILY
Status: DISCONTINUED | OUTPATIENT
Start: 2023-11-02 | End: 2023-11-03 | Stop reason: HOSPADM

## 2023-11-02 RX ORDER — DEXAMETHASONE SODIUM PHOSPHATE 4 MG/ML
INJECTION, SOLUTION INTRA-ARTICULAR; INTRALESIONAL; INTRAMUSCULAR; INTRAVENOUS; SOFT TISSUE
Status: DISCONTINUED | OUTPATIENT
Start: 2023-11-02 | End: 2023-11-02

## 2023-11-02 RX ORDER — LIDOCAINE HYDROCHLORIDE 20 MG/ML
INJECTION, SOLUTION EPIDURAL; INFILTRATION; INTRACAUDAL; PERINEURAL
Status: DISCONTINUED | OUTPATIENT
Start: 2023-11-02 | End: 2023-11-02

## 2023-11-02 RX ORDER — SODIUM CHLORIDE 9 MG/ML
INJECTION, SOLUTION INTRAVENOUS
Status: COMPLETED | OUTPATIENT
Start: 2023-11-02 | End: 2023-11-02

## 2023-11-02 RX ORDER — SODIUM CHLORIDE 0.9 % (FLUSH) 0.9 %
10 SYRINGE (ML) INJECTION
Status: DISCONTINUED | OUTPATIENT
Start: 2023-11-02 | End: 2023-11-03 | Stop reason: HOSPADM

## 2023-11-02 RX ORDER — IBUPROFEN 200 MG
16 TABLET ORAL
Status: DISCONTINUED | OUTPATIENT
Start: 2023-11-02 | End: 2023-11-03 | Stop reason: HOSPADM

## 2023-11-02 RX ORDER — GABAPENTIN 300 MG/1
300 CAPSULE ORAL 3 TIMES DAILY
Status: DISCONTINUED | OUTPATIENT
Start: 2023-11-02 | End: 2023-11-02

## 2023-11-02 RX ORDER — LIDOCAINE HYDROCHLORIDE 10 MG/ML
INJECTION INFILTRATION; PERINEURAL
Status: DISCONTINUED | OUTPATIENT
Start: 2023-11-02 | End: 2023-11-02 | Stop reason: HOSPADM

## 2023-11-02 RX ORDER — GLUCAGON 1 MG
1 KIT INJECTION
Status: DISCONTINUED | OUTPATIENT
Start: 2023-11-02 | End: 2023-11-03 | Stop reason: HOSPADM

## 2023-11-02 RX ORDER — METRONIDAZOLE 500 MG/100ML
500 INJECTION, SOLUTION INTRAVENOUS
Status: COMPLETED | OUTPATIENT
Start: 2023-11-02 | End: 2023-11-02

## 2023-11-02 RX ORDER — LIDOCAINE HYDROCHLORIDE 10 MG/ML
1 INJECTION, SOLUTION EPIDURAL; INFILTRATION; INTRACAUDAL; PERINEURAL
Status: COMPLETED | OUTPATIENT
Start: 2023-11-02 | End: 2023-11-02

## 2023-11-02 RX ORDER — ALBUTEROL SULFATE 90 UG/1
2 AEROSOL, METERED RESPIRATORY (INHALATION) EVERY 6 HOURS PRN
Status: DISCONTINUED | OUTPATIENT
Start: 2023-11-02 | End: 2023-11-03 | Stop reason: HOSPADM

## 2023-11-02 RX ORDER — PROPOFOL 10 MG/ML
VIAL (ML) INTRAVENOUS
Status: DISCONTINUED | OUTPATIENT
Start: 2023-11-02 | End: 2023-11-02

## 2023-11-02 RX ORDER — OXYCODONE HYDROCHLORIDE 5 MG/1
5 TABLET ORAL EVERY 6 HOURS PRN
Status: DISCONTINUED | OUTPATIENT
Start: 2023-11-02 | End: 2023-11-03 | Stop reason: HOSPADM

## 2023-11-02 RX ORDER — FUROSEMIDE 20 MG/1
20 TABLET ORAL DAILY
Status: DISCONTINUED | OUTPATIENT
Start: 2023-11-02 | End: 2023-11-03

## 2023-11-02 RX ORDER — INSULIN ASPART 100 [IU]/ML
8 INJECTION, SOLUTION INTRAVENOUS; SUBCUTANEOUS
Status: DISCONTINUED | OUTPATIENT
Start: 2023-11-02 | End: 2023-11-03 | Stop reason: HOSPADM

## 2023-11-02 RX ADMIN — FLUTICASONE PROPIONATE 50 MCG: 50 SPRAY, METERED NASAL at 09:11

## 2023-11-02 RX ADMIN — GABAPENTIN 300 MG: 300 CAPSULE ORAL at 09:11

## 2023-11-02 RX ADMIN — Medication 100 MCG: at 07:11

## 2023-11-02 RX ADMIN — CEFTRIAXONE SODIUM 2 G: 2 INJECTION, POWDER, FOR SOLUTION INTRAMUSCULAR; INTRAVENOUS at 07:11

## 2023-11-02 RX ADMIN — Medication 100 MCG: at 08:11

## 2023-11-02 RX ADMIN — INSULIN ASPART 4 UNITS: 100 INJECTION, SOLUTION INTRAVENOUS; SUBCUTANEOUS at 09:11

## 2023-11-02 RX ADMIN — FENTANYL CITRATE 100 MCG: 50 INJECTION INTRAMUSCULAR; INTRAVENOUS at 07:11

## 2023-11-02 RX ADMIN — ACETAMINOPHEN 1000 MG: 10 INJECTION, SOLUTION INTRAVENOUS at 10:11

## 2023-11-02 RX ADMIN — IBUPROFEN 800 MG: 800 INJECTION INTRAVENOUS at 01:11

## 2023-11-02 RX ADMIN — SODIUM CHLORIDE: 9 INJECTION, SOLUTION INTRAVENOUS at 07:11

## 2023-11-02 RX ADMIN — ACETAMINOPHEN 976.6 MG: 650 SOLUTION ORAL at 06:11

## 2023-11-02 RX ADMIN — GABAPENTIN 300 MG: 300 CAPSULE ORAL at 06:11

## 2023-11-02 RX ADMIN — GLYCOPYRROLATE 0.2 MG: 0.2 INJECTION INTRAMUSCULAR; INTRAVENOUS at 07:11

## 2023-11-02 RX ADMIN — LIDOCAINE HYDROCHLORIDE 10 MG: 10 INJECTION, SOLUTION EPIDURAL; INFILTRATION; INTRACAUDAL; PERINEURAL at 06:11

## 2023-11-02 RX ADMIN — ACETAMINOPHEN 1000 MG: 10 INJECTION, SOLUTION INTRAVENOUS at 01:11

## 2023-11-02 RX ADMIN — ROCURONIUM BROMIDE 50 MG: 10 INJECTION INTRAVENOUS at 07:11

## 2023-11-02 RX ADMIN — ONDANSETRON 4 MG: 2 INJECTION INTRAMUSCULAR; INTRAVENOUS at 07:11

## 2023-11-02 RX ADMIN — Medication 200 MCG: at 07:11

## 2023-11-02 RX ADMIN — MUPIROCIN 1 G: 20 OINTMENT TOPICAL at 06:11

## 2023-11-02 RX ADMIN — OXYCODONE HYDROCHLORIDE 10 MG: 10 TABLET ORAL at 09:11

## 2023-11-02 RX ADMIN — FENTANYL CITRATE 50 MCG: 50 INJECTION INTRAMUSCULAR; INTRAVENOUS at 08:11

## 2023-11-02 RX ADMIN — FUROSEMIDE 20 MG: 20 TABLET ORAL at 09:11

## 2023-11-02 RX ADMIN — INSULIN ASPART 4 UNITS: 100 INJECTION, SOLUTION INTRAVENOUS; SUBCUTANEOUS at 06:11

## 2023-11-02 RX ADMIN — CALCIUM CHLORIDE 500 MG: 100 INJECTION, SOLUTION INTRAVENOUS at 07:11

## 2023-11-02 RX ADMIN — IBUPROFEN 600 MG: 100 SUSPENSION ORAL at 06:11

## 2023-11-02 RX ADMIN — INSULIN ASPART 8 UNITS: 100 INJECTION, SOLUTION INTRAVENOUS; SUBCUTANEOUS at 06:11

## 2023-11-02 RX ADMIN — INSULIN DETEMIR 15 UNITS: 100 INJECTION, SOLUTION SUBCUTANEOUS at 09:11

## 2023-11-02 RX ADMIN — SODIUM CHLORIDE: 9 INJECTION, SOLUTION INTRAVENOUS at 06:11

## 2023-11-02 RX ADMIN — IBUPROFEN 800 MG: 800 INJECTION INTRAVENOUS at 09:11

## 2023-11-02 RX ADMIN — HEPARIN SODIUM 5000 UNITS: 5000 INJECTION INTRAVENOUS; SUBCUTANEOUS at 06:11

## 2023-11-02 RX ADMIN — LIDOCAINE HYDROCHLORIDE 100 MG: 20 INJECTION, SOLUTION EPIDURAL; INFILTRATION; INTRACAUDAL at 07:11

## 2023-11-02 RX ADMIN — METRONIDAZOLE 500 MG: 500 INJECTION, SOLUTION INTRAVENOUS at 06:11

## 2023-11-02 RX ADMIN — SUGAMMADEX 200 MG: 100 INJECTION, SOLUTION INTRAVENOUS at 08:11

## 2023-11-02 RX ADMIN — PROPOFOL 150 MG: 10 INJECTION, EMULSION INTRAVENOUS at 07:11

## 2023-11-02 RX ADMIN — DEXAMETHASONE SODIUM PHOSPHATE 4 MG: 4 INJECTION INTRA-ARTICULAR; INTRALESIONAL; INTRAMUSCULAR; INTRAVENOUS; SOFT TISSUE at 07:11

## 2023-11-02 RX ADMIN — SODIUM CHLORIDE: 9 INJECTION, SOLUTION INTRAVENOUS at 09:11

## 2023-11-02 NOTE — TRANSFER OF CARE
"Anesthesia Transfer of Care Note    Patient: KIRSTY Mason    Procedure(s) Performed: Procedure(s) (LRB):  CLOSURE, ILEOSTOMY, LOOP (N/A)    Patient location: PACU    Anesthesia Type: general    Transport from OR: Transported from OR on room air with adequate spontaneous ventilation    Post pain: adequate analgesia    Post assessment: no apparent anesthetic complications    Post vital signs: stable    Level of consciousness: awake    Nausea/Vomiting: no nausea/vomiting    Complications: none    Transfer of care protocol was followed      Last vitals: Visit Vitals  /69 (BP Location: Right arm, Patient Position: Lying)   Pulse 78   Temp 36.8 °C (98.2 °F) (Oral)   Resp 20   Ht 6' 1" (1.854 m)   Wt 110.4 kg (243 lb 6.2 oz)   SpO2 99%   BMI 32.11 kg/m²     "

## 2023-11-02 NOTE — ANESTHESIA PROCEDURE NOTES
Intubation    Date/Time: 11/2/2023 7:19 AM    Performed by: Karlie Farris CRNA  Authorized by: Natanael Dobbs MD    Intubation:     Induction:  Intravenous    Intubated:  Postinduction    Mask Ventilation:  Easy mask    Attempts:  1    Attempted By:  CRNA    Method of Intubation:  Video laryngoscopy    Blade:  Ordoñez 3    Laryngeal View Grade: Grade I - full view of cords      Difficult Airway Encountered?: No      Complications:  None    Airway Device:  Oral endotracheal tube    Airway Device Size:  7.0    Style/Cuff Inflation:  Cuffed (inflated to minimal occlusive pressure)    Tube secured:  22    Secured at:  The teeth    Placement Verified By:  Capnometry    Complicating Factors:  None    Findings Post-Intubation:  BS equal bilateral and atraumatic/condition of teeth unchanged

## 2023-11-02 NOTE — PLAN OF CARE
Pathway noted to be dated to start 10/23/2023, orders released this date. Confirmed with charge nurse before orders released.

## 2023-11-02 NOTE — OP NOTE
Date of procedure:   November 2, 2023    Indications for procedure:  The patient is a 68-year-old male with upper rectal cancer status post total neoadjuvant therapy with excellent clinical response.  Unfortunately, during watch and wait protocol he developed evidence of regrowth.  He underwent tumor specific low anterior resection with colorectal anastomosis and diverting loop ileostomy.  Imaging and endoscopy have confirmed healing of the anastomosis.  The patient is brought to the operating room today for closure of his for a loop ileostomy.    Preoperative diagnosis:   Diverting loop ileostomy following low anterior resection for rectal cancer    Postoperative diagnosis:  Same    Name of procedure:  Closure of loop ileostomy    Surgeon:   MEGHAN Hong MD    Assistant surgeon:  Solomon Novoa MD    Type of anesthesia:  GETA    EBL:  minimal  Cc's    Drains:  none    Specimen:  none    Findings:   Loop ileostomy  Primary closure, no resection, 1 layer interrupted 000 Vicryl    Technique in detail:  The patient is brought to the operating room positively identified and placed on the operating table in the supine position.  He underwent general endotracheal anesthesia without complication.  An enhanced recovery pathway was initiated.  Multimodal analgesia was employed.  He would undergone an outpatient oral mechanical and oral antibiotic bowel preparation and he received intravenous antibiotics.  His abdomen was prepared and draped in usual fashion.    A critical time-out was performed    Stoma was identified in the right upper quadrant.  Mucocutaneous junction was incised with a 15 scalpel.  The loop of stoma was then sharply dissected from the anterior abdominal wall using cold scissor dissection.  The loop bowel was dissected into the peritoneal cavity and the bowel was delivered onto the abdomen.  Primary repair was performed using interrupted 3-0 Vicryl suture inverting the mucosa as a single layer.   Bowel was reduced into the peritoneal cavity.  The wound was irrigated with saline solution.  Local anesthetic was infiltrated.      The posterior sheath and peritoneum were then approximated using continuous 0 PDS suture.  The anterior fascia was approximated using figure-of-eight 1 PDS suture, interrupted.  Subcutaneous tissues were irrigated with saline solution.  Hemostasis was assured.  Wound was approximated using an encircling suture of 0 Vicryl.  Dry gauze dressing was applied over the stoma.      The patient was awakened from anesthesia, extubated without incident and returned to the postanesthesia care unit in stable condition.

## 2023-11-02 NOTE — ANESTHESIA PREPROCEDURE EVALUATION
11/02/2023  Pre-operative evaluation for Procedure(s) (LRB):  CLOSURE, ILEOSTOMY, LOOP (N/A)    KIRSTY Mason is a 68 y.o. male     Patient Active Problem List   Diagnosis    Dyslipidemia    Gastroesophageal reflux disease    Rhinosinusitis    Uncontrolled type 2 diabetes mellitus with hyperglycemia    Controlled type 2 diabetes mellitus with both eyes affected by mild nonproliferative retinopathy without macular edema, without long-term current use of insulin    Hypertensive retinopathy of both eyes    Anemia    Rectal cancer    Carotid arterial disease    MELY (obstructive sleep apnea)    Venous stasis dermatitis of left lower extremity    Bilateral lower extremity edema    Neuropathy    Elevated blood pressure reading in office without diagnosis of hypertension    Pre-op evaluation    Type 2 diabetes mellitus with diabetic polyneuropathy, with long-term current use of insulin    Type 2 diabetes mellitus with ophthalmic complication, with long-term current use of insulin       Review of patient's allergies indicates:  No Known Allergies    No current facility-administered medications on file prior to encounter.     Current Outpatient Medications on File Prior to Encounter   Medication Sig Dispense Refill    cinnamon bark 500 mg capsule Take 500 mg by mouth once daily.      insulin lispro 100 unit/mL pen INJECT 8 UNITS INTO THE SKIN THREE TIMES DAILY BEFORE MEALS. 24 mL 3    LEVEMIR FLEXTOUCH U-100 INSULN 100 unit/mL (3 mL) InPn pen ADMINISTER 25 UNITS UNDER THE SKIN EVERY DAY (Patient taking differently: every evening.) 24 mL 3    b complex vitamins tablet Take 1 tablet by mouth once daily.      blood-glucose meter (BLOOD GLUCOSE MONITORING) kit Use as instructed 1 each 0    cetirizine (ZYRTEC) 10 MG tablet Take 10 mg by mouth once daily.      fluticasone (FLONASE) 50 mcg/actuation nasal  "spray INHALE ONE SPRAY(S) IN EACH NOSTRIL ONCE DAILY AS NEEDED (Patient taking differently: 1 spray by Each Nostril route once daily.) 1 Bottle 6    furosemide (LASIX) 20 MG tablet TAKE 1 TO 2 TABLETS BY MOUTH ONCE DAILY AS NEEDED FOR  SWELLING 60 tablet 6    MULTIVIT-MINERALS/HERBAL 121 (URINOZINC PROSTATE FORMULA ORAL) Take 1 tablet by mouth 2 (two) times daily.      naproxen (NAPROSYN) 500 MG tablet Take 500 mg by mouth 2 (two) times daily with meals.      pantoprazole (PROTONIX) 40 MG tablet Take 1 tablet (40 mg total) by mouth before breakfast. 90 tablet 3    pen needle, diabetic 32 gauge x 3/16" Ndle Use for daily insulin injections 100 each 3    PROAIR HFA 90 mcg/actuation inhaler INHALE 2 PUFFS BY MOUTH EVERY 6 HOURS AS NEEDED FOR WHEEZING RESCUE 9 g 0    UNABLE TO FIND Prevagen take 1 tablet daily      VIT C/VIT E ACETATE/LUTEIN/MIN (OCUVITE LUTEIN ORAL) Take 1 tablet by mouth once daily.         Past Surgical History:   Procedure Laterality Date    chemo treatment      COLECTOMY, LAPAROSCOPIC N/A 4/6/2023    Procedure: COLECTOMY, LAPAROSCOPIC LOWER ANTERIOR, SPLENIC PLEXOR MOBILIZATION ;  Surgeon: MEGHAN Hong MD;  Location: [facility] OR 2ND FLR;  Service: Colon and Rectal;  Laterality: N/A;    COLONOSCOPY N/A 02/24/2022    Dr. Greer    COLONOSCOPY N/A 03/17/2022    Dr. Greer    COLONOSCOPY N/A 12/5/2022    Procedure: COLONOSCOPY;  Surgeon: MEGHAN Hong MD;  Location: [facility];  Service: Colon and Rectal;  Laterality: N/A;    FLEXIBLE SIGMOIDOSCOPY N/A 4/6/2023    Procedure: SIGMOIDOSCOPY, FLEXIBLE;  Surgeon: MEGHAN Hong MD;  Location: [facility] OR 2ND FLR;  Service: Colon and Rectal;  Laterality: N/A;    ILEOSTOMY Right 4/6/2023    Procedure: CREATION, ILEOSTOMY;  Surgeon: MEGHAN Hong MD;  Location: [facility] OR 2ND FLR;  Service: Colon and Rectal;  Laterality: Right;    INSERTION OF TUNNELED CENTRAL VENOUS CATHETER (CVC) WITH SUBCUTANEOUS PORT Left 05/19/2022    Procedure: " INSERTION, PORT-A-CATH;  Surgeon: Bulmaro Cárdenas MD;  Location: [facility];  Service: General;  Laterality: Left;    radiation treatment      UPPER GI endoscopy      received fax from Dr. Greer.       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    Tobacco Use    Smoking status: Never    Smokeless tobacco: Never   Substance and Sexual Activity    Alcohol use: No    Drug use: Never    Sexual activity: Yes     Partners: Female   Social History Narrative    Stairs- none     Social Determinants of Health     Food Insecurity: No Food Insecurity (4/10/2023)    Hunger Vital Sign     Worried About Running Out of Food in the Last Year: Never true     Ran Out of Food in the Last Year: Never true   Physical Activity: Unknown (4/10/2023)    Exercise Vital Sign     Days of Exercise per Week: 0 days   Housing Stability: Unknown (4/10/2023)    Housing Stability Vital Sign     Unable to Pay for Housing in the Last Year: No             Pre-op Assessment    I have reviewed the Patient Summary Reports.     I have reviewed the Nursing Notes. I have reviewed the NPO Status.      Review of Systems  Anesthesia Hx:  No problems with previous Anesthesia    Cardiovascular:  Cardiovascular Normal     Pulmonary:   Sleep Apnea    Hepatic/GI:   GERD    Neurological:   Neuromuscular Disease,    Endocrine:   Diabetes        Physical Exam    Airway:  Mouth Opening: Normal  Tongue: Normal    Chest/Lungs:  Normal Respiratory Rate    Heart:  Rhythm: Regular Rhythm        Anesthesia Plan  Type of Anesthesia, risks & benefits discussed:    Anesthesia Type: Gen ETT  Intra-op Monitoring Plan: Standard ASA Monitors  Induction:  IV  Informed Consent: Informed consent signed with the Patient and all parties understand the risks and agree with anesthesia plan.  All questions answered.   ASA Score: 3    Ready For  Surgery From Anesthesia Perspective.     .

## 2023-11-02 NOTE — BRIEF OP NOTE
[facility] - Surgery ([facility])  Brief Operative Note    SUMMARY     Surgery Date: 11/2/2023     Surgeon(s) and Role:     * MEGHAN Hong MD - Primary     * Solomon Novoa MD - Fellow    Assisting Surgeon: None    Pre-op Diagnosis:  Ileostomy in place [Z93.2]    Post-op Diagnosis:  Post-Op Diagnosis Codes:     * Ileostomy in place [Z93.2]    Procedure(s) (LRB):  CLOSURE, ILEOSTOMY, LOOP (N/A)    Anesthesia: General    Implants:  * No implants in log *    Operative Findings: Loop ileostomy takedown with primary anastomosis.     Estimated Blood Loss: 3 mL    Estimated Blood Loss has been documented.         Specimens:   Specimen (24h ago, onward)      None            [id number]
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
## 2023-11-02 NOTE — NURSING
Nurses Note -- 4 Eyes      11/2/2023   4:17 PM      Skin assessed during: Transfer       No Altered Skin Integrity Present    [x]Prevention Measures Documented      [] Yes- Altered Skin Integrity Present or Discovered   [] LDA Added if Not in Epic (Describe Wound)   [] New Altered Skin Integrity was Present on Admit and Documented in LDA   [] Wound Image Taken    Wound Care Consulted? No    Attending Nurse:  Betsy Ashley RN/Staff Member:   Otto HARDIN

## 2023-11-03 VITALS
DIASTOLIC BLOOD PRESSURE: 57 MMHG | RESPIRATION RATE: 18 BRPM | HEIGHT: 73 IN | OXYGEN SATURATION: 96 % | WEIGHT: 243.38 LBS | SYSTOLIC BLOOD PRESSURE: 102 MMHG | BODY MASS INDEX: 32.26 KG/M2 | HEART RATE: 65 BPM | TEMPERATURE: 98 F

## 2023-11-03 PROBLEM — Z93.2 ILEOSTOMY IN PLACE: Status: ACTIVE | Noted: 2023-11-03

## 2023-11-03 PROBLEM — Z93.2 ILEOSTOMY IN PLACE: Status: RESOLVED | Noted: 2023-11-03 | Resolved: 2023-11-03

## 2023-11-03 LAB
ANION GAP SERPL CALC-SCNC: 10 MMOL/L (ref 8–16)
ANION GAP SERPL CALC-SCNC: 12 MMOL/L (ref 8–16)
BASOPHILS # BLD AUTO: 0.01 K/UL (ref 0–0.2)
BASOPHILS # BLD AUTO: 0.03 K/UL (ref 0–0.2)
BASOPHILS NFR BLD: 0.1 % (ref 0–1.9)
BASOPHILS NFR BLD: 0.3 % (ref 0–1.9)
BUN SERPL-MCNC: 19 MG/DL (ref 8–23)
BUN SERPL-MCNC: 20 MG/DL (ref 8–23)
CALCIUM SERPL-MCNC: 8.2 MG/DL (ref 8.7–10.5)
CALCIUM SERPL-MCNC: 8.6 MG/DL (ref 8.7–10.5)
CHLORIDE SERPL-SCNC: 103 MMOL/L (ref 95–110)
CHLORIDE SERPL-SCNC: 105 MMOL/L (ref 95–110)
CO2 SERPL-SCNC: 18 MMOL/L (ref 23–29)
CO2 SERPL-SCNC: 20 MMOL/L (ref 23–29)
CREAT SERPL-MCNC: 1.4 MG/DL (ref 0.5–1.4)
CREAT SERPL-MCNC: 1.5 MG/DL (ref 0.5–1.4)
DIFFERENTIAL METHOD: ABNORMAL
DIFFERENTIAL METHOD: ABNORMAL
EOSINOPHIL # BLD AUTO: 0 K/UL (ref 0–0.5)
EOSINOPHIL # BLD AUTO: 0 K/UL (ref 0–0.5)
EOSINOPHIL NFR BLD: 0 % (ref 0–8)
EOSINOPHIL NFR BLD: 0.3 % (ref 0–8)
ERYTHROCYTE [DISTWIDTH] IN BLOOD BY AUTOMATED COUNT: 12.3 % (ref 11.5–14.5)
ERYTHROCYTE [DISTWIDTH] IN BLOOD BY AUTOMATED COUNT: 12.5 % (ref 11.5–14.5)
EST. GFR  (NO RACE VARIABLE): 49.8 ML/MIN/1.73 M^2
EST. GFR  (NO RACE VARIABLE): 54.1 ML/MIN/1.73 M^2
GLUCOSE SERPL-MCNC: 207 MG/DL (ref 70–110)
GLUCOSE SERPL-MCNC: 280 MG/DL (ref 70–110)
HCT VFR BLD AUTO: 34.3 % (ref 40–54)
HCT VFR BLD AUTO: 34.7 % (ref 40–54)
HGB BLD-MCNC: 11.6 G/DL (ref 14–18)
HGB BLD-MCNC: 12.1 G/DL (ref 14–18)
IMM GRANULOCYTES # BLD AUTO: 0.04 K/UL (ref 0–0.04)
IMM GRANULOCYTES # BLD AUTO: 0.04 K/UL (ref 0–0.04)
IMM GRANULOCYTES NFR BLD AUTO: 0.4 % (ref 0–0.5)
IMM GRANULOCYTES NFR BLD AUTO: 0.4 % (ref 0–0.5)
LYMPHOCYTES # BLD AUTO: 1.2 K/UL (ref 1–4.8)
LYMPHOCYTES # BLD AUTO: 1.7 K/UL (ref 1–4.8)
LYMPHOCYTES NFR BLD: 13.4 % (ref 18–48)
LYMPHOCYTES NFR BLD: 16 % (ref 18–48)
MAGNESIUM SERPL-MCNC: 1.9 MG/DL (ref 1.6–2.6)
MCH RBC QN AUTO: 31.7 PG (ref 27–31)
MCH RBC QN AUTO: 32.3 PG (ref 27–31)
MCHC RBC AUTO-ENTMCNC: 33.4 G/DL (ref 32–36)
MCHC RBC AUTO-ENTMCNC: 35.3 G/DL (ref 32–36)
MCV RBC AUTO: 92 FL (ref 82–98)
MCV RBC AUTO: 95 FL (ref 82–98)
MONOCYTES # BLD AUTO: 0.7 K/UL (ref 0.3–1)
MONOCYTES # BLD AUTO: 1.1 K/UL (ref 0.3–1)
MONOCYTES NFR BLD: 10.4 % (ref 4–15)
MONOCYTES NFR BLD: 7.4 % (ref 4–15)
NEUTROPHILS # BLD AUTO: 7.3 K/UL (ref 1.8–7.7)
NEUTROPHILS # BLD AUTO: 7.7 K/UL (ref 1.8–7.7)
NEUTROPHILS NFR BLD: 72.6 % (ref 38–73)
NEUTROPHILS NFR BLD: 78.7 % (ref 38–73)
NRBC BLD-RTO: 0 /100 WBC
NRBC BLD-RTO: 0 /100 WBC
PHOSPHATE SERPL-MCNC: 4 MG/DL (ref 2.7–4.5)
PLATELET # BLD AUTO: 152 K/UL (ref 150–450)
PLATELET # BLD AUTO: 154 K/UL (ref 150–450)
PMV BLD AUTO: 11 FL (ref 9.2–12.9)
PMV BLD AUTO: 11.2 FL (ref 9.2–12.9)
POCT GLUCOSE: 144 MG/DL (ref 70–110)
POCT GLUCOSE: 204 MG/DL (ref 70–110)
POCT GLUCOSE: 256 MG/DL (ref 70–110)
POCT GLUCOSE: 284 MG/DL (ref 70–110)
POTASSIUM SERPL-SCNC: 4.2 MMOL/L (ref 3.5–5.1)
POTASSIUM SERPL-SCNC: 4.4 MMOL/L (ref 3.5–5.1)
RBC # BLD AUTO: 3.66 M/UL (ref 4.6–6.2)
RBC # BLD AUTO: 3.75 M/UL (ref 4.6–6.2)
SODIUM SERPL-SCNC: 133 MMOL/L (ref 136–145)
SODIUM SERPL-SCNC: 135 MMOL/L (ref 136–145)
WBC # BLD AUTO: 10.56 K/UL (ref 3.9–12.7)
WBC # BLD AUTO: 9.25 K/UL (ref 3.9–12.7)

## 2023-11-03 PROCEDURE — 80048 BASIC METABOLIC PNL TOTAL CA: CPT | Performed by: STUDENT IN AN ORGANIZED HEALTH CARE EDUCATION/TRAINING PROGRAM

## 2023-11-03 PROCEDURE — 85025 COMPLETE CBC W/AUTO DIFF WBC: CPT | Performed by: STUDENT IN AN ORGANIZED HEALTH CARE EDUCATION/TRAINING PROGRAM

## 2023-11-03 PROCEDURE — 84100 ASSAY OF PHOSPHORUS: CPT | Performed by: STUDENT IN AN ORGANIZED HEALTH CARE EDUCATION/TRAINING PROGRAM

## 2023-11-03 PROCEDURE — 36415 COLL VENOUS BLD VENIPUNCTURE: CPT | Performed by: STUDENT IN AN ORGANIZED HEALTH CARE EDUCATION/TRAINING PROGRAM

## 2023-11-03 PROCEDURE — 25000003 PHARM REV CODE 250: Performed by: STUDENT IN AN ORGANIZED HEALTH CARE EDUCATION/TRAINING PROGRAM

## 2023-11-03 PROCEDURE — 83735 ASSAY OF MAGNESIUM: CPT | Performed by: STUDENT IN AN ORGANIZED HEALTH CARE EDUCATION/TRAINING PROGRAM

## 2023-11-03 PROCEDURE — 63600175 PHARM REV CODE 636 W HCPCS: Performed by: STUDENT IN AN ORGANIZED HEALTH CARE EDUCATION/TRAINING PROGRAM

## 2023-11-03 PROCEDURE — 25000003 PHARM REV CODE 250: Performed by: NURSE PRACTITIONER

## 2023-11-03 RX ORDER — POLYETHYLENE GLYCOL 3350 17 G/17G
17 POWDER, FOR SOLUTION ORAL DAILY
Qty: 238 G | Refills: 0 | Status: SHIPPED | OUTPATIENT
Start: 2023-11-03

## 2023-11-03 RX ORDER — OXYCODONE HYDROCHLORIDE 5 MG/1
5 TABLET ORAL EVERY 4 HOURS PRN
Qty: 20 TABLET | Refills: 0 | Status: SHIPPED | OUTPATIENT
Start: 2023-11-03 | End: 2024-02-28

## 2023-11-03 RX ADMIN — FUROSEMIDE 20 MG: 20 TABLET ORAL at 08:11

## 2023-11-03 RX ADMIN — INSULIN ASPART 6 UNITS: 100 INJECTION, SOLUTION INTRAVENOUS; SUBCUTANEOUS at 08:11

## 2023-11-03 RX ADMIN — INSULIN ASPART 8 UNITS: 100 INJECTION, SOLUTION INTRAVENOUS; SUBCUTANEOUS at 11:11

## 2023-11-03 RX ADMIN — GABAPENTIN 300 MG: 300 CAPSULE ORAL at 02:11

## 2023-11-03 RX ADMIN — INSULIN ASPART 8 UNITS: 100 INJECTION, SOLUTION INTRAVENOUS; SUBCUTANEOUS at 08:11

## 2023-11-03 RX ADMIN — INSULIN ASPART 8 UNITS: 100 INJECTION, SOLUTION INTRAVENOUS; SUBCUTANEOUS at 04:11

## 2023-11-03 RX ADMIN — ACETAMINOPHEN 1000 MG: 10 INJECTION, SOLUTION INTRAVENOUS at 06:11

## 2023-11-03 RX ADMIN — ACETAMINOPHEN 1000 MG: 500 TABLET ORAL at 01:11

## 2023-11-03 RX ADMIN — IBUPROFEN 800 MG: 400 TABLET ORAL at 01:11

## 2023-11-03 RX ADMIN — PANTOPRAZOLE SODIUM 40 MG: 40 TABLET, DELAYED RELEASE ORAL at 06:11

## 2023-11-03 RX ADMIN — SODIUM CHLORIDE, SODIUM LACTATE, POTASSIUM CHLORIDE, AND CALCIUM CHLORIDE 1000 ML: .6; .31; .03; .02 INJECTION, SOLUTION INTRAVENOUS at 12:11

## 2023-11-03 RX ADMIN — IBUPROFEN 800 MG: 800 INJECTION INTRAVENOUS at 06:11

## 2023-11-03 RX ADMIN — GABAPENTIN 300 MG: 300 CAPSULE ORAL at 08:11

## 2023-11-03 NOTE — ANESTHESIA POSTPROCEDURE EVALUATION
Anesthesia Post Evaluation    Patient: KIRSTY Mason    Procedure(s) Performed: Procedure(s) (LRB):  CLOSURE, ILEOSTOMY, LOOP (N/A)    Final Anesthesia Type: general      Patient location during evaluation: PACU  Patient participation: Yes- Able to Participate  Level of consciousness: awake and alert  Post-procedure vital signs: reviewed and stable  Pain management: adequate  Airway patency: patent    PONV status at discharge: No PONV  Anesthetic complications: no      Cardiovascular status: blood pressure returned to baseline  Respiratory status: unassisted  Hydration status: euvolemic  Follow-up not needed.          Vitals Value Taken Time   /56 11/03/23 0851   Temp 36.4 °C (97.6 °F) 11/03/23 0741   Pulse 79 11/03/23 0851   Resp 20 11/03/23 0741   SpO2 96 % 11/03/23 0741         Event Time   Out of Recovery 09:19:00         Pain/Beth Score: Pain Rating Prior to Med Admin: 0 (11/3/2023  6:25 AM)  Pain Rating Post Med Admin: 2 (11/3/2023  7:54 AM)  Beth Score: 10 (11/2/2023  2:30 PM)

## 2023-11-03 NOTE — PLAN OF CARE
[facility] [facility]  Initial Discharge Assessment       Primary Care Provider: Jass Mdcermott II, MD    Admission Diagnosis: Ileostomy in place [Z93.2]  Rectal cancer [C20]    Admission Date: 11/2/2023  Expected Discharge Date: 11/3/2023    Transition of Care Barriers: None    Payor: [employer]ARE / Plan: [employer] 65 / Product Type: Medicare Advantage /     Extended Emergency Contact Information  Primary Emergency Contact: Maria Guadalupe Mason  Address: [address] [place] [address] [address] [place] [address]  Home Phone: [phone]  Mobile Phone: [phone]  Relation: Spouse    Discharge Plan A: Home with family  Discharge Plan B: Home with family, Home Health      [id number] [address] [facility] [address]  [address] [facility] [place] [address]  [facility] [address]  Phone: [phone] Fax: [phone]      Initial Assessment (most recent)       Adult Discharge Assessment - 11/03/23 1129          Discharge Assessment    Assessment Type Discharge Planning Assessment     Confirmed/corrected address, phone number and insurance Yes     Confirmed Demographics Correct on Facesheet     Source of Information patient     Communicated DA with patient/caregiver Yes     People in Home spouse     Do you expect to return to your current living situation? Yes     Do you have help at home or someone to help you manage your care at home? Yes     Who are your caregiver(s) and their phone number(s)? spouse     Prior to hospitilization cognitive status: Alert/Oriented     Current cognitive status: Alert/Oriented     Home Layout Able to live on 1st floor     Equipment Currently Used at Home glucometer     Do you currently have service(s) that help you manage your care at home? No     Do you take prescription medications? Yes     Do you have prescription coverage? Yes     Do you have any problems affording any of your prescribed medications? No      Is the patient taking medications as prescribed? yes     How do you get to doctors appointments? car, drives self     Are you on dialysis? No     Do you take coumadin? No     DME Needed Upon Discharge  none     Discharge Plan discussed with: Patient     Transition of Care Barriers None     Discharge Plan A Home with family     Discharge Plan B Home with family;Home Health        Physical Activity    On average, how many days per week do you engage in moderate to strenuous exercise (like a brisk walk)? 0 days     On average, how many minutes do you engage in exercise at this level? 0 min        Financial Resource Strain    How hard is it for you to pay for the very basics like food, housing, medical care, and heating? Not hard at all        Housing Stability    In the last 12 months, was there a time when you were not able to pay the mortgage or rent on time? No     In the last 12 months, was there a time when you did not have a steady place to sleep or slept in a shelter (including now)? No        Transportation Needs    In the past 12 months, has lack of transportation kept you from medical appointments or from getting medications? No     In the past 12 months, has lack of transportation kept you from meetings, work, or from getting things needed for daily living? No        Food Insecurity    Within the past 12 months, you worried that your food would run out before you got the money to buy more. Never true     Within the past 12 months, the food you bought just didn't last and you didn't have money to get more. Never true        Stress    Do you feel stress - tense, restless, nervous, or anxious, or unable to sleep at night because your mind is troubled all the time - these days? Not at all        Social Connections    In a typical week, how many times do you talk on the phone with family, friends, or neighbors? More than three times a week     How often do you get together with friends or relatives? Once a week      How often do you attend Bahai or Holiness services? More than 4 times per year     Do you belong to any clubs or organizations such as Bahai groups, unions, fraternal or athletic groups, or school groups? Yes     How often do you attend meetings of the clubs or organizations you belong to? More than 4 times per year     Are you , , , , never , or living with a partner?                    Patient lives with spouse in a single story home with one small step to enter home. Patient does not feel he will need any post acute services upon hospital discharge. His spouse is primary caregiver who will assist in his care once home and also provide transportation home.   Patient dc pending ROBF and he is hopeful he will discharge today.  Discharge Plan A and Plan B have been determined by review of patient's clinical status, future medical and therapeutic needs, and coverage/benefits for post-acute care in coordination with multidisciplinary team members.

## 2023-11-03 NOTE — PLAN OF CARE
Problem: Adult Inpatient Plan of Care  Goal: Optimal Comfort and Wellbeing  Intervention: Monitor Pain and Promote Comfort  Flowsheets (Taken 11/2/2023 1943)  Pain Management Interventions: position adjusted     Problem: Infection  Goal: Absence of Infection Signs and Symptoms  Intervention: Prevent or Manage Infection  Flowsheets (Taken 11/2/2023 1943)  Infection Management: aseptic technique maintained  Isolation Precautions: precautions initiated     Problem: Fall Injury Risk  Goal: Absence of Fall and Fall-Related Injury  Intervention: Identify and Manage Contributors  Flowsheets (Taken 11/2/2023 1943)  Self-Care Promotion: independence encouraged  Medication Review/Management: medications reviewed  Intervention: Promote Injury-Free Environment  Flowsheets (Taken 11/2/2023 1943)  Safety Promotion/Fall Prevention: assistive device/personal item within reach

## 2023-11-03 NOTE — DISCHARGE INSTRUCTIONS
"Discharge Instructions After Abdominal Operation     Pain Control: It is expected to have pain after surgery, but this should improve over the next several weeks. You may be prescribed a narcotic pain medication on discharge. You can take this medication as prescribed if the pain is severe but try to decrease the frequency at which you use the narcotic over the initial two (2) weeks.  You may find you need less narcotic pain medication if you combine or stagger it with Tylenol® (acetaminophen) and/or Advil® (ibuprofen). For example, you may take Tylenol 1000mg, then take Advil 600mg 3 hours later, then repeat Tylenol 3 hours after the Advil, and so on.  You should not take more than 4000 mg of Tylenol® or 3200 mg of Advil® in a 24-hour period.    Wound Care: The incisions can be left open to air unless there is drainage, in which case you should cover the wound with a dry, clean bandage or piece of gauze. Change the dressing 1-2 times each day as needed to maintain a relatively dry dressing. You may have "skin glue" covering your incisions which will peel/crumble off on its own over the next week or two. If you have staples in place, staples are removed in 2-3 weeks by a nurse or physician.  You should shower every day without a dressing on the incisions and let the water run over the incisions. Do not soak in a bathtub, hot tub or pool until the incisions are completely healed, which usually takes ~4-6 weeks.    Bowel Function: Diarrhea and loose stool are normal and expected after intestinal surgery. Bowel function initially tends to be erratic (increased frequency, gas, liquid/loose consistency, seepage, urgency), but it will improve over the next several months as your body adjusts to the surgical changes.    Diet: Unless directed otherwise by your surgeon, after surgery you should do the following:  Eat several (4-6) small meals each day.  If you experience difficulty eating, add in supplemental drinks (Boost®, " Ensure®, Glucerna®).  If you are having loose stools, your diet should include foods to add bulk to the stool such as applesauce, bananas, cheese, peanut butter, pasta, and potatoes.  Follow a Low Fiber Diet for six (6) weeks. Avoid particular foods including the following:  Raw vegetables, beans, corn, mushrooms, legumes, peas, potato skins, sauerkraut, stewed tomatoes, brussels sprouts  Fresh fruit, dried fruit (such as raisins or prunes), coconut, juices with pulp. (It is okay to eat fruits such as bananas, melons, canned fruits, and avocado.)  Meat with casings (such as hot dogs, kielbasa, sausage), shellfish  Nuts, popcorn, seeds, chunky peanut butter  Coarse whole grains including breads/rolls with nuts, cereals with nuts, coarse whole grains, poppy, sesame seeds    Activity: Walking is encouraged after surgery. Light aerobic activity such as climbing stairs or leisurely bike riding is acceptable but listen to your body and let pain be your guide when reintroducing activity. If it hurts, don't do it. Avoid the following activities for six (6) weeks after surgery:  Lifting objects over 10 pounds  Strenuous activity such as press-ups, push-ups, crunches, sit-ups, vigorous pulling/pushing, and repetitive twisting or bending    Driving: You should not drive a vehicle for the two (2) weeks after surgery or while taking narcotic pain medications. When you return to driving, sit in your vehicle without starting the ignition and step on the brake firmly and rapidly a few times to assure you are confident with this task. Do not go alone the first time you drive.    Potential Problems:   Bowel obstruction or ileus is characterized by persistent abdominal cramps, bloating, constipation, nausea, or vomiting. If the symptoms are mild, you should restrict your dietary intake to only liquids, and avoid solid food for 2-3 days. If the symptoms are more severe or persist beyond 24 hours, please call your surgeon's office for  advice.    Frequent stools and loose stools are best managed by using bulking agents or anti-diarrheal medication. Please call your surgeon if diarrhea is not improving after a few weeks to discuss starting one of the medications below.  Benefiber®, Citrucel®, Fibercon®, Konsyl®, and Metamucil® are bulking agents that are available at most grocery stores and pharmacies. The medication should be mixed using one (1) teaspoon of the powder in the minimum amount of fluid required to dissolve the agent and taken 1-2 times each day. Benefiber® can be alternatively sprinkled over food 1-2 times each day.  Imodium® (loperamide) is also available without a prescription. It is most effective if taken before meals. You should not take more than eight (8) tablets (32 mg) of Imodium in a 24-hour period. Please call your surgeon's office if you start taking Imodium®.     Dehydration can commonly occur, and its symptoms or signs include dark urine, dizziness when standing, dry mouth, increased heart rate, leg cramps, and low volumes (less than 800 ml) of urine. If these occur, you should immediately call your surgeon's office. To avoid dehydration, you should do the following:  Drink a variety of fluids. Use an oral rehydration salt solution as listed in the attachment below and sip the solution between meals.  Eat salty foods or add salt to your food.  Use an anti-diarrheal medication or bulking agent as previously instructed by your surgeon.     Wound infection can occur after any surgery and is characterized by increased drainage of cloudy fluid, odor, pain around the wound, redness of the skin around the wound extending 2-3 fingerbreadths outward, or temperature greater than 101 degrees. Please immediately call your surgeon's office if you begin experiencing these symptoms or signs.

## 2023-11-03 NOTE — HOSPITAL COURSE
Mr Mason is a 69yo male who presented with loop ileostomy. He underwent loop ileostomy takedown with primary repair. He did well postop with good diet tolerance, return of bowel function on POD1, ambulating on his own, and pain well controlled. He was discharged home in good condition.

## 2023-11-03 NOTE — PLAN OF CARE
Problem: Adult Inpatient Plan of Care  Goal: Plan of Care Review  Outcome: Met  Goal: Patient-Specific Goal (Individualized)  Outcome: Met  Goal: Absence of Hospital-Acquired Illness or Injury  11/3/2023 1745 by Melina Robles RN  Outcome: Met  11/3/2023 0724 by Melina Robles RN  Outcome: Ongoing, Progressing  Goal: Optimal Comfort and Wellbeing  11/3/2023 1745 by Melina Robles RN  Outcome: Met  11/3/2023 0724 by Melina Robles RN  Outcome: Ongoing, Progressing  Goal: Readiness for Transition of Care  Outcome: Met     Problem: Diabetes Comorbidity  Goal: Blood Glucose Level Within Targeted Range  Outcome: Met     Problem: Infection  Goal: Absence of Infection Signs and Symptoms  11/3/2023 1745 by Melina Robles RN  Outcome: Met  11/3/2023 0724 by Melina Robles RN  Outcome: Ongoing, Progressing     Problem: Fall Injury Risk  Goal: Absence of Fall and Fall-Related Injury  11/3/2023 1745 by Melina Robles RN  Outcome: Met  11/3/2023 0724 by Melina Robles RN  Outcome: Ongoing, Progressing

## 2023-11-03 NOTE — NURSING
Alert and oriented x4. Reviewed AVS with patient and wife at bedside, answered all questions, verbalized understanding, IV removed without difficulty, NAD noted

## 2023-11-03 NOTE — PLAN OF CARE
Problem: Adult Inpatient Plan of Care  Goal: Absence of Hospital-Acquired Illness or Injury  Outcome: Ongoing, Progressing  Goal: Optimal Comfort and Wellbeing  Outcome: Ongoing, Progressing     Problem: Infection  Goal: Absence of Infection Signs and Symptoms  Outcome: Ongoing, Progressing     Problem: Fall Injury Risk  Goal: Absence of Fall and Fall-Related Injury  Outcome: Ongoing, Progressing

## 2023-11-03 NOTE — NURSING
1900: Bedside Shift report received from off-going RN Servine. Pt. Resting in bed at this time, NAD, Call bell within reach.

## 2023-11-03 NOTE — DISCHARGE SUMMARY
[facility]  Colorectal Surgery  Discharge Summary      Patient Name: KIRSTY Mason  MRN: [id number]  Admission Date: 11/2/2023  Hospital Length of Stay: 1 days  Discharge Date and Time:  11/03/2023 5:15 PM  Attending Physician: MEGHAN Hong MD   Discharging Provider: Solomon Novoa MD  Primary Care Provider: Jass Mcdermott II, MD     HPI:  No notes on file    Procedure(s) (LRB):  CLOSURE, ILEOSTOMY, LOOP (N/A)     Hospital Course:  Mr Mason is a 69yo male who presented with loop ileostomy. He underwent loop ileostomy takedown with primary repair. He did well postop with good diet tolerance, return of bowel function on POD1, ambulating on his own, and pain well controlled. He was discharged home in good condition.       Goals of Care Treatment Preferences:  Code Status: Full Code          Significant Diagnostic Studies: N/A    Pending Diagnostic Studies:     None        Final Active Diagnoses:      Problems Resolved During this Admission:    Diagnosis Date Noted Date Resolved POA    PRINCIPAL PROBLEM:  Ileostomy in place [Z93.2] 11/03/2023 11/03/2023 Not Applicable      Discharged Condition: good    Disposition: Home or Self Care    Follow Up:   Follow-up Information     MEGHAN Hong MD. Schedule an appointment as soon as possible for a visit in 2 week(s).    Specialty: Colon and Rectal Surgery  Contact information:  Isabela GOLDBERGJULIO ROLLINS  [facility] [address]  [phone]                       Patient Instructions:      Diet Adult Regular     Notify your health care provider if you experience any of the following:  temperature >100.4     Notify your health care provider if you experience any of the following:  persistent nausea and vomiting or diarrhea     Notify your health care provider if you experience any of the following:  severe uncontrolled pain     Activity as tolerated     Medications:  Reconciled Home Medications:      Medication List      START taking these medications    oxyCODONE 5 MG  "immediate release tablet  Commonly known as: ROXICODONE  Take 1 tablet (5 mg total) by mouth every 4 (four) hours as needed for Pain.     polyethylene glycol 17 gram/dose powder  Commonly known as: GLYCOLAX  Use cap to measure 17 g, then mix in liquid and drink by mouth once daily.        CHANGE how you take these medications    fluticasone propionate 50 mcg/actuation nasal spray  Commonly known as: FLONASE  INHALE ONE SPRAY(S) IN EACH NOSTRIL ONCE DAILY AS NEEDED  What changed: See the new instructions.     LEVEMIR FLEXTOUCH U100 INSULIN 100 unit/mL (3 mL) Inpn pen  Generic drug: insulin detemir U-100 (Levemir)  ADMINISTER 25 UNITS UNDER THE SKIN EVERY DAY  What changed: See the new instructions.        CONTINUE taking these medications    b complex vitamins tablet  Take 1 tablet by mouth once daily.     blood-glucose meter kit  Commonly known as: BLOOD GLUCOSE MONITORING  Use as instructed     cetirizine 10 MG tablet  Commonly known as: ZYRTEC  Take 10 mg by mouth once daily.     cinnamon bark 500 mg capsule  Take 500 mg by mouth once daily.     ciprofloxacin HCl 500 MG tablet  Commonly known as: CIPRO  Take 1 tablet (500 mg total) by mouth 2 (two) times daily.     ELIQUIS 5 mg Tab  Generic drug: apixaban  TAKE 1 TABLET(5 MG) BY MOUTH TWICE DAILY     furosemide 20 MG tablet  Commonly known as: LASIX  TAKE 1 TO 2 TABLETS BY MOUTH ONCE DAILY AS NEEDED FOR  SWELLING     insulin lispro 100 unit/mL pen  INJECT 8 UNITS INTO THE SKIN THREE TIMES DAILY BEFORE MEALS.     naproxen 500 MG tablet  Commonly known as: NAPROSYN  Take 500 mg by mouth 2 (two) times daily with meals.     OCUVITE LUTEIN ORAL  Take 1 tablet by mouth once daily.     pantoprazole 40 MG tablet  Commonly known as: PROTONIX  Take 1 tablet (40 mg total) by mouth before breakfast.     pen needle, diabetic 32 gauge x 3/16" Ndle  Use for daily insulin injections     PROAIR HFA 90 mcg/actuation inhaler  Generic drug: albuterol  INHALE 2 PUFFS BY MOUTH EVERY 6 " HOURS AS NEEDED FOR WHEEZING RESCUE     UNABLE TO FIND  Prevagen take 1 tablet daily     URINOZINC PROSTATE FORMULA ORAL  Take 1 tablet by mouth 2 (two) times daily.        STOP taking these medications    metroNIDAZOLE 500 MG tablet  Commonly known as: DAMASO Novoa MD  Colorectal Surgery  [facility]

## 2023-11-03 NOTE — PROGRESS NOTES
Colon and Rectal Surgery Progress Note    Patient name: KIRSTY Mason   YOB: 1955   MRN: [id number]    Patient Name: KIRSTY Mason  Date: 11/3/2023    Subjective:  OR yesterday. Feeling well this AM. Tolerating diet. Ambulating. No BM or flatus. Making good urine  Medications:    Current Facility-Administered Medications:     0.9%  NaCl infusion, , Intravenous, Continuous, Solomon Novoa MD, Last Rate: 40 mL/hr at 11/02/23 0929, New Bag at 11/02/23 0929    [COMPLETED] acetaminophen 1,000 mg/100 mL (10 mg/mL) injection 1,000 mg, 1,000 mg, Intravenous, Q8H, Last Rate: 400 mL/hr at 11/03/23 0625, 1,000 mg at 11/03/23 0625 **FOLLOWED BY** acetaminophen tablet 1,000 mg, 1,000 mg, Oral, Q8H, Solomon Novoa MD    albuterol inhaler 2 puff, 2 puff, Inhalation, Q6H PRN, Solomon Novoa MD    dextrose 10% bolus 125 mL 125 mL, 12.5 g, Intravenous, PRN, Solomon Novoa MD    dextrose 10% bolus 250 mL 250 mL, 25 g, Intravenous, PRN, Solomon Novoa MD    fluticasone propionate 50 mcg/actuation nasal spray 50 mcg, 1 spray, Each Nostril, Daily, Solomon Novoa MD, 50 mcg at 11/02/23 0939    furosemide tablet 20 mg, 20 mg, Oral, Daily, Solomon Novoa MD, 20 mg at 11/02/23 0940    gabapentin capsule 300 mg, 300 mg, Oral, TID, Snow Bear NP, 300 mg at 11/02/23 2128    glucagon (human recombinant) injection 1 mg, 1 mg, Intramuscular, PRN, Solomon Novoa MD    glucose chewable tablet 16 g, 16 g, Oral, PRN, Solomon Novoa MD    glucose chewable tablet 24 g, 24 g, Oral, PRN, Solomon Novoa MD    ibuprofen 800 mg in dextrose 5 % 250 mL (Vial-Mate), 800 mg, Intravenous, Q8H, Stopped at 11/03/23 0727 **FOLLOWED BY** ibuprofen tablet 800 mg, 800 mg, Oral, Q8H, Solomon Novoa MD    insulin aspart U-100 pen 0-10 Units, 0-10 Units, Subcutaneous, QID (AC + HS) PRN, Solomon Novoa MD, 4 Units at 11/02/23 2129    insulin aspart U-100 pen 8 Units, 8 Units, Subcutaneous, TIDWM, Solomon Novoa MD, 8  Units at 11/02/23 1812    insulin detemir U-100 (Levemir) pen 25 Units, 25 Units, Subcutaneous, QHS, Solomon Novoa MD    mupirocin 2 % ointment, , Nasal, BID, Solomon Novoa MD    ondansetron injection 4 mg, 4 mg, Intravenous, Q6H PRN, Solomon Novoa MD    oxyCODONE immediate release tablet 5 mg, 5 mg, Oral, Q6H PRN, Solomon Novoa MD    oxyCODONE immediate release tablet Tab 10 mg, 10 mg, Oral, Q4H PRN, Solomon Novoa MD, 10 mg at 11/02/23 0940    pantoprazole EC tablet 40 mg, 40 mg, Oral, Before breakfast, Solomon Novoa MD, 40 mg at 11/03/23 0640    sodium chloride 0.9% flush 10 mL, 10 mL, Intra-Catheter, PRN, Solomon Novoa MD    traMADoL tablet 50 mg, 50 mg, Oral, Q6H PRN, Solomon Novoa MD    Vital Signs:  Vitals:    11/03/23 0444   BP: (!) 111/56   Pulse: 73   Resp: 18   Temp: 97.3 °F (36.3 °C)       In/Out:  Intake/Output - Last 3 Shifts         11/01 0700 11/02 0659 11/02 0700 11/03 0659    P.O.  240    IV Piggyback  856.9    Total Intake(mL/kg)  1096.9 (9.9)    Urine (mL/kg/hr)  0 (0)    Blood  3    Total Output  3    Net  +1093.9          Urine Occurrence  6 x    Stool Occurrence  0 x            Physical Exam:  General: Alert, oriented, in no apparent distress  HEENT: Sclera anicteric, trachea midline  Lungs: Normal respiratory rate and effort on room air  Abdomen: Soft, appropriate renée-incisional TTP, nondistended. Previous ostomy site with small amount of strikethrough on mepilex  Extremities: Warm, well perfused, no edema, SCDs in place  Neuro: Grossly intact, moves all extremities  Psych: Appropriate affect    Laboratory Studies:  Complete Blood Count:  Lab Results   Component Value Date/Time    WBC 9.25 11/03/2023 04:53 AM    HGB 12.1 (L) 11/03/2023 04:53 AM    HCT 34.3 (L) 11/03/2023 04:53 AM    RBC 3.75 (L) 11/03/2023 04:53 AM     11/03/2023 04:53 AM       Basic Chemistry Panel:  Lab Results   Component Value Date/Time     (L) 11/03/2023 04:53 AM    K 4.4  11/03/2023 04:53 AM     11/03/2023 04:53 AM    CO2 20 (L) 11/03/2023 04:53 AM    BUN 19 11/03/2023 04:53 AM    CREATININE 1.4 11/03/2023 04:53 AM    CALCIUM 8.6 (L) 11/03/2023 04:53 AM       Lab Results   Component Value Date/Time    CRP 6.1 04/26/2023 02:05 PM       Imaging Studies:  None new    Assessment:  KIRSTY Mason is a 68 y.o. year old male with history of loop ileostomy now s/p ileostomy takedown    Plan:  Neuro: Cont multimodal oral analgesia, limit narcotics    CV: HDS without tachycardia, CTM    Pulm: Satting well on RA, encourage IS/ambulation ppx    GI:  - Advance to regular diet  - Cont Zofran/compazine prn nausea    :   - Good UOP, BUN/Cr stable/WNL    Heme:   - Hgb stable, no e/o active bleeding, cont LVX PPX    ID:   - No postoperative antibiotics indicated    Endo:   - on SSI, 8U after meals, increase basal to 25U daily    FEN:   - NS@40, replete lytes Mg>2 Phos>3 K>4, advance to LRD    PPX:   - LVX/SCDs  - IS/Ambulation  - No GI PPX indicated    Dispo: Ongoing, JANETH Novoa MD  Colorectal Surgery Fellow

## 2023-11-06 NOTE — PLAN OF CARE
[facility]  Discharge Final Note    Primary Care Provider: Jass Mcdermott II, MD    Expected Discharge Date: 11/3/2023    Final Discharge Note (most recent)       Final Note - 11/03/23 1810          Final Note    Assessment Type Final Discharge Note     Anticipated Discharge Disposition Home or Self Care     Hospital Resources/Appts/Education Provided Provided patient/caregiver with written discharge plan information;Provided education on problems/symptoms using teachback                   Future Appointments   Date Time Provider Department Center   11/13/2023  2:20 PM MEGHAN Hong MD [facility]   12/22/2023 10:00 AM BOBBI Jacobs MD [facility] UROLOGY [place]   4/15/2024  9:15 AM Michel Rehman MD [facility] CARDIO [place]        Contact Info       MEGHAN Hong MD   Specialty: Colon and Rectal Surgery    [address]  [facility] [address]   Phone: [phone]       Next Steps: Schedule an appointment as soon as possible for a visit in 2 week(s)
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
## 2023-11-13 ENCOUNTER — OFFICE VISIT (OUTPATIENT)
Dept: SURGERY | Facility: CLINIC | Age: 68
End: 2023-11-13
Payer: MEDICARE

## 2023-11-13 ENCOUNTER — PATIENT MESSAGE (OUTPATIENT)
Dept: CARDIOLOGY | Facility: CLINIC | Age: 68
End: 2023-11-13
Payer: MEDICARE

## 2023-11-13 VITALS
WEIGHT: 259.69 LBS | OXYGEN SATURATION: 98 % | BODY MASS INDEX: 34.42 KG/M2 | RESPIRATION RATE: 16 BRPM | SYSTOLIC BLOOD PRESSURE: 138 MMHG | TEMPERATURE: 98 F | HEIGHT: 73 IN | HEART RATE: 68 BPM | DIASTOLIC BLOOD PRESSURE: 76 MMHG

## 2023-11-13 DIAGNOSIS — Z48.89 ENCOUNTER FOR POST SURGICAL WOUND CHECK: Primary | ICD-10-CM

## 2023-11-13 PROCEDURE — 99024 POSTOP FOLLOW-UP VISIT: CPT | Mod: S$GLB,,, | Performed by: COLON & RECTAL SURGERY

## 2023-11-13 PROCEDURE — 1125F AMNT PAIN NOTED PAIN PRSNT: CPT | Mod: CPTII,S$GLB,, | Performed by: COLON & RECTAL SURGERY

## 2023-11-13 PROCEDURE — 1159F PR MEDICATION LIST DOCUMENTED IN MEDICAL RECORD: ICD-10-PCS | Mod: CPTII,S$GLB,, | Performed by: COLON & RECTAL SURGERY

## 2023-11-13 PROCEDURE — 3078F DIAST BP <80 MM HG: CPT | Mod: CPTII,S$GLB,, | Performed by: COLON & RECTAL SURGERY

## 2023-11-13 PROCEDURE — 3288F PR FALLS RISK ASSESSMENT DOCUMENTED: ICD-10-PCS | Mod: CPTII,S$GLB,, | Performed by: COLON & RECTAL SURGERY

## 2023-11-13 PROCEDURE — 3046F HEMOGLOBIN A1C LEVEL >9.0%: CPT | Mod: CPTII,S$GLB,, | Performed by: COLON & RECTAL SURGERY

## 2023-11-13 PROCEDURE — 3075F SYST BP GE 130 - 139MM HG: CPT | Mod: CPTII,S$GLB,, | Performed by: COLON & RECTAL SURGERY

## 2023-11-13 PROCEDURE — 1159F MED LIST DOCD IN RCRD: CPT | Mod: CPTII,S$GLB,, | Performed by: COLON & RECTAL SURGERY

## 2023-11-13 PROCEDURE — 3046F PR MOST RECENT HEMOGLOBIN A1C LEVEL > 9.0%: ICD-10-PCS | Mod: CPTII,S$GLB,, | Performed by: COLON & RECTAL SURGERY

## 2023-11-13 PROCEDURE — 99999 PR PBB SHADOW E&M-EST. PATIENT-LVL V: CPT | Mod: PBBFAC,,, | Performed by: COLON & RECTAL SURGERY

## 2023-11-13 PROCEDURE — 1101F PT FALLS ASSESS-DOCD LE1/YR: CPT | Mod: CPTII,S$GLB,, | Performed by: COLON & RECTAL SURGERY

## 2023-11-13 PROCEDURE — 99999 PR PBB SHADOW E&M-EST. PATIENT-LVL V: ICD-10-PCS | Mod: PBBFAC,,, | Performed by: COLON & RECTAL SURGERY

## 2023-11-13 PROCEDURE — 1101F PR PT FALLS ASSESS DOC 0-1 FALLS W/OUT INJ PAST YR: ICD-10-PCS | Mod: CPTII,S$GLB,, | Performed by: COLON & RECTAL SURGERY

## 2023-11-13 PROCEDURE — 99024 PR POST-OP FOLLOW-UP VISIT: ICD-10-PCS | Mod: S$GLB,,, | Performed by: COLON & RECTAL SURGERY

## 2023-11-13 PROCEDURE — 3288F FALL RISK ASSESSMENT DOCD: CPT | Mod: CPTII,S$GLB,, | Performed by: COLON & RECTAL SURGERY

## 2023-11-13 PROCEDURE — 1125F PR PAIN SEVERITY QUANTIFIED, PAIN PRESENT: ICD-10-PCS | Mod: CPTII,S$GLB,, | Performed by: COLON & RECTAL SURGERY

## 2023-11-13 PROCEDURE — 3078F PR MOST RECENT DIASTOLIC BLOOD PRESSURE < 80 MM HG: ICD-10-PCS | Mod: CPTII,S$GLB,, | Performed by: COLON & RECTAL SURGERY

## 2023-11-13 PROCEDURE — 3075F PR MOST RECENT SYSTOLIC BLOOD PRESS GE 130-139MM HG: ICD-10-PCS | Mod: CPTII,S$GLB,, | Performed by: COLON & RECTAL SURGERY

## 2023-12-21 ENCOUNTER — PATIENT MESSAGE (OUTPATIENT)
Dept: RESEARCH | Facility: HOSPITAL | Age: 68
End: 2023-12-21
Payer: MEDICARE

## 2024-02-21 ENCOUNTER — PATIENT MESSAGE (OUTPATIENT)
Dept: SURGERY | Facility: CLINIC | Age: 69
End: 2024-02-21
Payer: MEDICARE

## 2024-02-28 PROBLEM — G62.0 NEUROPATHY DUE TO CHEMOTHERAPEUTIC DRUG: Status: ACTIVE | Noted: 2023-03-28

## 2024-02-28 PROBLEM — T45.1X5A NEUROPATHY DUE TO CHEMOTHERAPEUTIC DRUG: Status: ACTIVE | Noted: 2023-03-28
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
## 2024-03-25 ENCOUNTER — OFFICE VISIT (OUTPATIENT)
Dept: SURGERY | Facility: CLINIC | Age: 69
End: 2024-03-25
Payer: MEDICARE

## 2024-03-25 ENCOUNTER — LAB VISIT (OUTPATIENT)
Dept: LAB | Facility: HOSPITAL | Age: 69
End: 2024-03-25
Attending: COLON & RECTAL SURGERY
Payer: MEDICARE

## 2024-03-25 ENCOUNTER — TELEPHONE (OUTPATIENT)
Dept: GASTROENTEROLOGY | Facility: CLINIC | Age: 69
End: 2024-03-25
Payer: MEDICARE

## 2024-03-25 VITALS
TEMPERATURE: 98 F | HEART RATE: 78 BPM | RESPIRATION RATE: 16 BRPM | BODY MASS INDEX: 33.25 KG/M2 | WEIGHT: 252 LBS | SYSTOLIC BLOOD PRESSURE: 140 MMHG | DIASTOLIC BLOOD PRESSURE: 70 MMHG | OXYGEN SATURATION: 99 %

## 2024-03-25 DIAGNOSIS — Z08 ENCOUNTER FOR FOLLOW-UP SURVEILLANCE OF RECTAL CANCER: ICD-10-CM

## 2024-03-25 DIAGNOSIS — Z85.048 ENCOUNTER FOR FOLLOW-UP SURVEILLANCE OF RECTAL CANCER: Primary | ICD-10-CM

## 2024-03-25 DIAGNOSIS — Z08 ENCOUNTER FOR FOLLOW-UP SURVEILLANCE OF RECTAL CANCER: Primary | ICD-10-CM

## 2024-03-25 DIAGNOSIS — Z85.048 ENCOUNTER FOR FOLLOW-UP SURVEILLANCE OF RECTAL CANCER: ICD-10-CM

## 2024-03-25 DIAGNOSIS — R19.4 CHANGE IN BOWEL HABITS: ICD-10-CM

## 2024-03-25 LAB
CEA SERPL-MCNC: 2.5 NG/ML (ref 0–5)
CREAT SERPL-MCNC: 1 MG/DL (ref 0.5–1.4)
EST. GFR  (NO RACE VARIABLE): >60 ML/MIN/1.73 M^2

## 2024-03-25 PROCEDURE — 99999 PR PBB SHADOW E&M-EST. PATIENT-LVL III: CPT | Mod: PBBFAC,,, | Performed by: COLON & RECTAL SURGERY

## 2024-03-25 PROCEDURE — 45330 DIAGNOSTIC SIGMOIDOSCOPY: CPT | Mod: 25,PBBFAC,PN | Performed by: COLON & RECTAL SURGERY

## 2024-03-25 PROCEDURE — 99213 OFFICE O/P EST LOW 20 MIN: CPT | Mod: PBBFAC,PN | Performed by: COLON & RECTAL SURGERY

## 2024-03-25 PROCEDURE — 36415 COLL VENOUS BLD VENIPUNCTURE: CPT | Mod: PN | Performed by: COLON & RECTAL SURGERY

## 2024-03-25 PROCEDURE — 82565 ASSAY OF CREATININE: CPT | Mod: PN | Performed by: COLON & RECTAL SURGERY

## 2024-03-25 PROCEDURE — 82378 CARCINOEMBRYONIC ANTIGEN: CPT | Performed by: COLON & RECTAL SURGERY

## 2024-03-25 PROCEDURE — 99215 OFFICE O/P EST HI 40 MIN: CPT | Mod: 25,S$GLB,, | Performed by: COLON & RECTAL SURGERY

## 2024-03-26 ENCOUNTER — TELEPHONE (OUTPATIENT)
Dept: SURGERY | Facility: CLINIC | Age: 69
End: 2024-03-26
Payer: MEDICARE

## 2024-03-26 NOTE — TELEPHONE ENCOUNTER
Spoke with patient in regards to scheduled imaging and follow up appointment with Dr Hong.    Pt v/u of call -- answered questions to patient's satisfaction. Advised patient to call the clinic with any other questions or concerns.

## 2024-04-02 NOTE — TELEPHONE ENCOUNTER
Diagnosis is confirmed from the case order. Screening  BMI: 33.25  First Colonoscopy? No (hx of polyps)  Family history of colon cancer? No  Medical issues? DM-insulin  Blood thinners? No  Preferred day: Any.    I instruct Mr. Mason to have a someone he knows (no Uber or taxi) to drive him home after the procedure since he'll be sedated.  I also inform pt the exact arrival time will be provided to him by the surgery center a couple of days to the afternoon prior to the procedure date.  Inform pt I will send prep instructions via [employer] and Mail (address confirmed).   Pt verbalizes understanding to the statements above.

## 2024-04-03 NOTE — PROVATION PATIENT INSTRUCTIONS
Discharge Summary/Instructions after an Endoscopic Procedure  Patient Name: Edgar Mason  Patient MRN: [id number]  Patient YOB: 1955  Monday, March 25, 2024  West Hong MD  Dear patient,  As a result of recent federal legislation (The Federal Cures Act), you may   receive lab or pathology results from your procedure in your [facility]   account before your physician is able to contact you. Your physician or   their representative will relay the results to you with their   recommendations at their soonest availability.  Thank you,  RESTRICTIONS:  During your procedure today, you received medications for sedation.  These   medications may affect your judgment, balance and coordination.  Therefore,   for 24 hours, you have the following restrictions:   - DO NOT drive a car, operate machinery, make legal/financial decisions,   sign important papers or drink alcohol.    ACTIVITY:  Today: no heavy lifting, straining or running due to procedural   sedation/anesthesia.  The following day: return to full activity including work.  DIET:  Eat and drink normally unless instructed otherwise.     TREATMENT FOR COMMON SIDE EFFECTS:  - Mild abdominal pain, nausea, belching, bloating or excessive gas:  rest,   eat lightly and use a heating pad.  - Sore Throat: treat with throat lozenges and/or gargle with warm salt   water.  - Because air was used during the procedure, expelling large amounts of air   from your rectum or belching is normal.  - If a bowel prep was taken, you may not have a bowel movement for 1-3 days.    This is normal.  SYMPTOMS TO WATCH FOR AND REPORT TO YOUR PHYSICIAN:  1. Abdominal pain or bloating, other than gas cramps.  2. Chest pain.  3. Back pain.  4. Signs of infection such as: chills or fever occurring within 24 hours   after the procedure.  5. Rectal bleeding, which would show as bright red, maroon, or black stools.   (A tablespoon of blood from the rectum is not serious, especially if   hemorrhoids  are present.)  6. Vomiting.  7. Weakness or dizziness.  GO DIRECTLY TO THE NEAREST EMERGENCY ROOM IF YOU HAVE ANY OF THE FOLLOWING:      Difficulty breathing              Chills and/or fever over 101 F   Persistent vomiting and/or vomiting blood   Severe abdominal pain   Severe chest pain   Black, tarry stools   Bleeding- more than one tablespoon   Any other symptom or condition that you feel may need urgent attention  Your doctor recommends these additional instructions:  If any biopsies were taken, your doctors clinic will contact you in 1 to 2   weeks with any results.  You are being discharged to home.  For questions, problems or results please call your physician - West Hong MD at Work:  [phone].  EMERGENCY PHONE NUMBER: [phone], LAB RESULTS: [phone]  IF A COMPLICATION OR EMERGENCY SITUATION ARISES AND YOU ARE UNABLE TO REACH   YOUR PHYSICIAN - GO DIRECTLY TO THE EMERGENCY ROOM.  ___________________________________________  Nurse Signature  ___________________________________________  Patient/Designated Responsible Party Signature  West Hong MD  4/3/2024 5:06:54 PM  This report has been verified and signed electronically.  Dear patient,  As a result of recent federal legislation (The Federal Cures Act), you may   receive lab or pathology results from your procedure in your [facility]   account before your physician is able to contact you. Your physician or   their representative will relay the results to you with their   recommendations at their soonest availability.  Thank you.  PROVATION

## 2024-04-10 ENCOUNTER — HOSPITAL ENCOUNTER (OUTPATIENT)
Dept: RADIOLOGY | Facility: HOSPITAL | Age: 69
Discharge: HOME | End: 2024-04-10
Attending: COLON & RECTAL SURGERY
Payer: MEDICARE

## 2024-04-10 DIAGNOSIS — Z08 ENCOUNTER FOR FOLLOW-UP SURVEILLANCE OF RECTAL CANCER: ICD-10-CM

## 2024-04-10 DIAGNOSIS — Z85.048 ENCOUNTER FOR FOLLOW-UP SURVEILLANCE OF RECTAL CANCER: ICD-10-CM

## 2024-04-10 PROCEDURE — 71260 CT THORAX DX C+: CPT | Mod: 26,,, | Performed by: STUDENT IN AN ORGANIZED HEALTH CARE EDUCATION/TRAINING PROGRAM

## 2024-04-10 PROCEDURE — 25500020 PHARM REV CODE 255: Mod: PO | Performed by: COLON & RECTAL SURGERY

## 2024-04-10 PROCEDURE — 74177 CT ABD & PELVIS W/CONTRAST: CPT | Mod: 26,,, | Performed by: STUDENT IN AN ORGANIZED HEALTH CARE EDUCATION/TRAINING PROGRAM

## 2024-04-10 PROCEDURE — A9698 NON-RAD CONTRAST MATERIALNOC: HCPCS | Mod: PO | Performed by: COLON & RECTAL SURGERY

## 2024-04-10 PROCEDURE — 74177 CT ABD & PELVIS W/CONTRAST: CPT | Mod: TC,PO

## 2024-04-10 RX ADMIN — IOHEXOL 1000 ML: 12 SOLUTION ORAL at 05:04

## 2024-04-10 RX ADMIN — IOHEXOL 100 ML: 350 INJECTION, SOLUTION INTRAVENOUS at 05:04

## 2024-04-15 ENCOUNTER — OFFICE VISIT (OUTPATIENT)
Dept: CARDIOLOGY | Facility: CLINIC | Age: 69
End: 2024-04-15
Payer: MEDICARE

## 2024-04-15 VITALS
BODY MASS INDEX: 33.17 KG/M2 | HEART RATE: 82 BPM | SYSTOLIC BLOOD PRESSURE: 113 MMHG | WEIGHT: 250.25 LBS | DIASTOLIC BLOOD PRESSURE: 73 MMHG | HEIGHT: 73 IN

## 2024-04-15 DIAGNOSIS — D50.9 IRON DEFICIENCY ANEMIA, UNSPECIFIED IRON DEFICIENCY ANEMIA TYPE: Primary | ICD-10-CM

## 2024-04-15 DIAGNOSIS — R03.0 ELEVATED BLOOD PRESSURE READING IN OFFICE WITHOUT DIAGNOSIS OF HYPERTENSION: ICD-10-CM

## 2024-04-15 DIAGNOSIS — E78.5 DYSLIPIDEMIA: Primary | ICD-10-CM

## 2024-04-15 DIAGNOSIS — G47.33 OSA (OBSTRUCTIVE SLEEP APNEA): ICD-10-CM

## 2024-04-15 DIAGNOSIS — I87.2 VENOUS STASIS DERMATITIS OF LEFT LOWER EXTREMITY: ICD-10-CM

## 2024-04-15 DIAGNOSIS — E11.3293 CONTROLLED TYPE 2 DIABETES MELLITUS WITH BOTH EYES AFFECTED BY MILD NONPROLIFERATIVE RETINOPATHY WITHOUT MACULAR EDEMA, WITHOUT LONG-TERM CURRENT USE OF INSULIN: ICD-10-CM

## 2024-04-15 DIAGNOSIS — R60.0 BILATERAL LOWER EXTREMITY EDEMA: ICD-10-CM

## 2024-04-15 DIAGNOSIS — C20 RECTAL CANCER: ICD-10-CM

## 2024-04-15 DIAGNOSIS — D50.8 OTHER IRON DEFICIENCY ANEMIA: ICD-10-CM

## 2024-04-15 PROCEDURE — 3066F NEPHROPATHY DOC TX: CPT | Mod: ,,, | Performed by: INTERNAL MEDICINE

## 2024-04-15 PROCEDURE — 99214 OFFICE O/P EST MOD 30 MIN: CPT | Mod: S$GLB,ICN,, | Performed by: INTERNAL MEDICINE

## 2024-04-15 PROCEDURE — 3061F NEG MICROALBUMINURIA REV: CPT | Mod: ,,, | Performed by: INTERNAL MEDICINE

## 2024-04-15 PROCEDURE — 99999 PR PBB SHADOW E&M-EST. PATIENT-LVL IV: CPT | Mod: PBBFAC,,, | Performed by: INTERNAL MEDICINE

## 2024-04-15 PROCEDURE — 99214 OFFICE O/P EST MOD 30 MIN: CPT | Mod: PBBFAC,PO | Performed by: INTERNAL MEDICINE
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
## 2024-04-17 ENCOUNTER — HOSPITAL ENCOUNTER (OUTPATIENT)
Dept: RADIOLOGY | Facility: HOSPITAL | Age: 69
Discharge: HOME | End: 2024-04-17
Attending: INTERNAL MEDICINE
Payer: MEDICARE

## 2024-04-17 DIAGNOSIS — E78.5 DYSLIPIDEMIA: ICD-10-CM

## 2024-04-17 DIAGNOSIS — I87.2 VENOUS STASIS DERMATITIS OF LEFT LOWER EXTREMITY: ICD-10-CM

## 2024-04-17 DIAGNOSIS — R03.0 ELEVATED BLOOD PRESSURE READING IN OFFICE WITHOUT DIAGNOSIS OF HYPERTENSION: ICD-10-CM

## 2024-04-17 PROCEDURE — 93970 EXTREMITY STUDY: CPT | Mod: 26,,, | Performed by: RADIOLOGY

## 2024-04-17 PROCEDURE — 93970 EXTREMITY STUDY: CPT | Mod: TC,PO
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
## 2024-05-02 ENCOUNTER — TELEPHONE (OUTPATIENT)
Dept: HEMATOLOGY/ONCOLOGY | Facility: CLINIC | Age: 69
End: 2024-05-02
Payer: MEDICARE

## 2024-05-02 NOTE — TELEPHONE ENCOUNTER
Spoke with pt regarding ref for JIN from Dr Rehman. Pt is already est with Dr Gomez for rectal ca. Pt states he will contact Dr Gomez office about scheduling. Pt would not like ref canceled for us just yet, states he will call back and let us know if he decides to see Dr Gomez or come here.

## 2024-05-08 ENCOUNTER — TELEPHONE (OUTPATIENT)
Dept: HEMATOLOGY/ONCOLOGY | Facility: CLINIC | Age: 69
End: 2024-05-08
Payer: MEDICARE

## 2024-05-08 NOTE — TELEPHONE ENCOUNTER
Called pt to check in and see if he has been able to get scheduled with Dr Gomez. Pt states he has been very busy with work but should be able to give them a call at the end of the week. Advised pt to let us know if he is not able to and I can get him scheduled here. Abundio COSME.

## 2024-05-15 ENCOUNTER — TELEPHONE (OUTPATIENT)
Dept: HEMATOLOGY/ONCOLOGY | Facility: CLINIC | Age: 69
End: 2024-05-15
Payer: MEDICARE

## 2024-05-15 NOTE — TELEPHONE ENCOUNTER
Spoke with pt in regards to referral for JIN. Pt states he does have an upcoming appt with Dr Gomez. Will cancel referral at this time. Pt VU

## 2024-05-16 ENCOUNTER — TELEPHONE (OUTPATIENT)
Dept: SURGERY | Facility: CLINIC | Age: 69
End: 2024-05-16
Payer: MEDICARE

## 2024-05-16 NOTE — TELEPHONE ENCOUNTER
LVM      ----- Message from Kami Castro LPN sent at 5/15/2024  2:26 PM CDT -----    ----- Message -----  From: Samantha Brown  Sent: 5/15/2024   1:06 PM CDT  To: [facility] Staff    Type:  Patient Returning Call    Who Called:  Patient   Who Left Message for Patient:  Ira  Does the patient know what this is regarding?:  Yes  Best Call Back Number:  [phone]  Additional Information:   Patient returning call to [facility] to schedule an appt..

## 2024-05-16 NOTE — TELEPHONE ENCOUNTER
Assisted pt in scheduling follow up appointment with Dr Hong -- pt is unavailable 6/12      ----- Message from Samantha Brown sent at 5/16/2024 10:14 AM CDT -----  Type:  Patient Returning Call    Who Called: Patient    Who Left Message for Patient:  Ira  Does the patient know what this is regarding?:    Best Call Back Number: [phone]   Additional Information: Patient returning missed call.

## 2024-06-05 ENCOUNTER — ANESTHESIA EVENT (OUTPATIENT)
Dept: ENDOSCOPY | Facility: HOSPITAL | Age: 69
End: 2024-06-05
Payer: MEDICARE

## 2024-06-05 ENCOUNTER — HOSPITAL ENCOUNTER (OUTPATIENT)
Facility: HOSPITAL | Age: 69
Discharge: HOME | End: 2024-06-05
Attending: COLON & RECTAL SURGERY | Admitting: COLON & RECTAL SURGERY
Payer: MEDICARE

## 2024-06-05 ENCOUNTER — ANESTHESIA (OUTPATIENT)
Dept: ENDOSCOPY | Facility: HOSPITAL | Age: 69
End: 2024-06-05
Payer: MEDICARE

## 2024-06-05 VITALS
OXYGEN SATURATION: 98 % | HEIGHT: 73 IN | HEART RATE: 64 BPM | SYSTOLIC BLOOD PRESSURE: 147 MMHG | DIASTOLIC BLOOD PRESSURE: 73 MMHG | TEMPERATURE: 98 F | RESPIRATION RATE: 18 BRPM | BODY MASS INDEX: 32.07 KG/M2 | WEIGHT: 242 LBS

## 2024-06-05 DIAGNOSIS — Z12.11 SCREEN FOR COLON CANCER: ICD-10-CM

## 2024-06-05 DIAGNOSIS — C20 RECTAL CANCER: Primary | ICD-10-CM

## 2024-06-05 LAB — GLUCOSE SERPL-MCNC: 94 MG/DL (ref 70–110)

## 2024-06-05 PROCEDURE — 45380 COLONOSCOPY AND BIOPSY: CPT | Mod: PT,,, | Performed by: COLON & RECTAL SURGERY

## 2024-06-05 PROCEDURE — 27200997: Mod: PO | Performed by: COLON & RECTAL SURGERY

## 2024-06-05 PROCEDURE — 63600175 PHARM REV CODE 636 W HCPCS: Mod: PO | Performed by: COLON & RECTAL SURGERY

## 2024-06-05 PROCEDURE — 37000008 HC ANESTHESIA 1ST 15 MINUTES: Mod: PO | Performed by: COLON & RECTAL SURGERY

## 2024-06-05 PROCEDURE — 37000009 HC ANESTHESIA EA ADD 15 MINS: Mod: PO | Performed by: COLON & RECTAL SURGERY

## 2024-06-05 PROCEDURE — 25000003 PHARM REV CODE 250: Mod: PO | Performed by: NURSE ANESTHETIST, CERTIFIED REGISTERED

## 2024-06-05 PROCEDURE — 88305 TISSUE EXAM BY PATHOLOGIST: CPT | Mod: 26,,, | Performed by: PATHOLOGY

## 2024-06-05 PROCEDURE — 45380 COLONOSCOPY AND BIOPSY: CPT | Mod: PT,PO | Performed by: COLON & RECTAL SURGERY

## 2024-06-05 PROCEDURE — 88305 TISSUE EXAM BY PATHOLOGIST: CPT | Mod: PO | Performed by: PATHOLOGY

## 2024-06-05 PROCEDURE — 63600175 PHARM REV CODE 636 W HCPCS: Mod: PO | Performed by: NURSE ANESTHETIST, CERTIFIED REGISTERED

## 2024-06-05 RX ORDER — SODIUM CHLORIDE, SODIUM LACTATE, POTASSIUM CHLORIDE, CALCIUM CHLORIDE 600; 310; 30; 20 MG/100ML; MG/100ML; MG/100ML; MG/100ML
INJECTION, SOLUTION INTRAVENOUS CONTINUOUS
Status: DISCONTINUED | OUTPATIENT
Start: 2024-06-05 | End: 2024-06-05 | Stop reason: HOSPADM

## 2024-06-05 RX ORDER — LIDOCAINE HYDROCHLORIDE 20 MG/ML
INJECTION INTRAVENOUS
Status: DISCONTINUED | OUTPATIENT
Start: 2024-06-05 | End: 2024-06-05

## 2024-06-05 RX ORDER — PROPOFOL 10 MG/ML
VIAL (ML) INTRAVENOUS
Status: DISCONTINUED | OUTPATIENT
Start: 2024-06-05 | End: 2024-06-05

## 2024-06-05 RX ORDER — SODIUM CHLORIDE 0.9 % (FLUSH) 0.9 %
10 SYRINGE (ML) INJECTION
Status: DISCONTINUED | OUTPATIENT
Start: 2024-06-05 | End: 2024-06-05 | Stop reason: HOSPADM

## 2024-06-05 RX ADMIN — PROPOFOL 90 MG: 10 INJECTION, EMULSION INTRAVENOUS at 07:06

## 2024-06-05 RX ADMIN — PROPOFOL 30 MG: 10 INJECTION, EMULSION INTRAVENOUS at 07:06

## 2024-06-05 RX ADMIN — SODIUM CHLORIDE, POTASSIUM CHLORIDE, SODIUM LACTATE AND CALCIUM CHLORIDE: 600; 310; 30; 20 INJECTION, SOLUTION INTRAVENOUS at 06:06

## 2024-06-05 RX ADMIN — LIDOCAINE HYDROCHLORIDE 100 MG: 20 INJECTION INTRAVENOUS at 07:06

## 2024-06-05 NOTE — TRANSFER OF CARE
"Anesthesia Transfer of Care Note    Patient: KIRSTY Mason    Procedure(s) Performed: Procedure(s) (LRB):  COLONOSCOPY (N/A)    Patient location: PACU    Anesthesia Type: general    Transport from OR: Transported from OR on room air with adequate spontaneous ventilation    Post pain: adequate analgesia    Post assessment: no apparent anesthetic complications and tolerated procedure well    Post vital signs: stable    Level of consciousness: awake and alert    Nausea/Vomiting: no nausea/vomiting    Complications: none    Transfer of care protocol was followed      Last vitals: Visit Vitals  /61 (BP Location: Left arm, Patient Position: Lying)   Pulse 71   Temp 36.4 °C (97.5 °F) (Skin)   Resp (!) 24   Ht 6' 1" (1.854 m)   Wt 109.8 kg (242 lb)   SpO2 98%   BMI 31.93 kg/m²     "
There are no Wet Read(s) to document.

## 2024-06-05 NOTE — ANESTHESIA POSTPROCEDURE EVALUATION
Anesthesia Post Evaluation    Patient: KIRSTY Mason    Procedure(s) Performed: Procedure(s) (LRB):  COLONOSCOPY (N/A)    Final Anesthesia Type: general      Patient location during evaluation: PACU  Patient participation: Yes- Able to Participate  Level of consciousness: awake and alert  Post-procedure vital signs: reviewed and stable  Pain management: adequate  Airway patency: patent    PONV status at discharge: No PONV  Anesthetic complications: no      Cardiovascular status: blood pressure returned to baseline  Respiratory status: unassisted and room air  Hydration status: euvolemic  Follow-up not needed.              Vitals Value Taken Time   /73 06/05/24 0805   Temp 36.4 °C (97.5 °F) 06/05/24 0738   Pulse 64 06/05/24 0805   Resp 18 06/05/24 0805   SpO2 98 % 06/05/24 0805         Event Time   Out of Recovery 08:16:00         Pain/Beth Score: Beth Score: 10 (6/5/2024  8:09 AM)
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
## 2024-06-05 NOTE — [employer] PATIENT INSTRUCTIONS
Discharge Summary/Instructions after an Endoscopic Procedure  Patient Name: Edgar Mason  Patient MRN: [id number]  Patient YOB: 1955  Wednesday, June 5, 2024  West Hong MD  Dear patient,  As a result of recent federal legislation (The Federal Cures Act), you may   receive lab or pathology results from your procedure in your [facility]   account before your physician is able to contact you. Your physician or   their representative will relay the results to you with their   recommendations at their soonest availability.  Thank you,  RESTRICTIONS:  During your procedure today, you received medications for sedation.  These   medications may affect your judgment, balance and coordination.  Therefore,   for 24 hours, you have the following restrictions:   - DO NOT drive a car, operate machinery, make legal/financial decisions,   sign important papers or drink alcohol.    ACTIVITY:  Today: no heavy lifting, straining or running due to procedural   sedation/anesthesia.  The following day: return to full activity including work.  DIET:  Eat and drink normally unless instructed otherwise.     TREATMENT FOR COMMON SIDE EFFECTS:  - Mild abdominal pain, nausea, belching, bloating or excessive gas:  rest,   eat lightly and use a heating pad.  - Sore Throat: treat with throat lozenges and/or gargle with warm salt   water.  - Because air was used during the procedure, expelling large amounts of air   from your rectum or belching is normal.  - If a bowel prep was taken, you may not have a bowel movement for 1-3 days.    This is normal.  SYMPTOMS TO WATCH FOR AND REPORT TO YOUR PHYSICIAN:  1. Abdominal pain or bloating, other than gas cramps.  2. Chest pain.  3. Back pain.  4. Signs of infection such as: chills or fever occurring within 24 hours   after the procedure.  5. Rectal bleeding, which would show as bright red, maroon, or black stools.   (A tablespoon of blood from the rectum is not serious, especially if    hemorrhoids are present.)  6. Vomiting.  7. Weakness or dizziness.  GO DIRECTLY TO THE NEAREST EMERGENCY ROOM IF YOU HAVE ANY OF THE FOLLOWING:      Difficulty breathing              Chills and/or fever over 101 F   Persistent vomiting and/or vomiting blood   Severe abdominal pain   Severe chest pain   Black, tarry stools   Bleeding- more than one tablespoon   Any other symptom or condition that you feel may need urgent attention  Your doctor recommends these additional instructions:  If any biopsies were taken, your doctors clinic will contact you in 1 to 2   weeks with any results.  You are being discharged to home.   You have a contact number available for emergencies.  The signs and symptoms   of potential delayed complications were discussed with you.  You may return   to normal activities tomorrow.  Written discharge instructions were   provided to you.   Resume your previous diet.   Continue your present medications.   We are waiting for your pathology results.   Your physician has recommended a repeat colonoscopy in six months for   surveillance based on pathology results.   Return to my office as previously scheduled.  For questions, problems or results please call your physician - West Hong MD at Work:  [phone].  EMERGENCY PHONE NUMBER: [phone], LAB RESULTS: [phone]  IF A COMPLICATION OR EMERGENCY SITUATION ARISES AND YOU ARE UNABLE TO REACH   YOUR PHYSICIAN - GO DIRECTLY TO THE EMERGENCY ROOM.  ___________________________________________  Nurse Signature  ___________________________________________  Patient/Designated Responsible Party Signature  West Hong MD  6/5/2024 7:41:22 AM  This report has been verified and signed electronically.  Dear patient,  As a result of recent federal legislation (The Federal Cures Act), you may   receive lab or pathology results from your procedure in your [facility]   account before your physician is able to contact you. Your physician or   their  representative will relay the results to you with their   recommendations at their soonest availability.  Thank you.  [employer]

## 2024-06-05 NOTE — ANESTHESIA PREPROCEDURE EVALUATION
06/05/2024  KIRSTY Mason is a 68 y.o., male.      Pre-op Assessment          Review of Systems  Pulmonary:  Pneumonia      Sleep Apnea     Obstructive Sleep Apnea (MELY).       Pulmonary Infection:  Pneumonia.     Hepatic/GI:     GERD      Gerd          Neurological:    Neuromuscular Disease,                                 Neuromuscular Disease   Endocrine:  Diabetes    Diabetes                          Physical Exam  General: Well nourished        Anesthesia Plan  Type of Anesthesia, risks & benefits discussed:    Anesthesia Type: Gen Natural Airway  Intra-op Monitoring Plan: Standard ASA Monitors  Induction:  IV  Informed Consent: Informed consent signed with the Patient and all parties understand the risks and agree with anesthesia plan.  All questions answered.   ASA Score: 3  Day of Surgery Review of History & Physical: H&P Update referred to the surgeon/provider.    Ready For Surgery From Anesthesia Perspective.     .

## 2024-06-17 LAB
COMMENT: NORMAL
FINAL PATHOLOGIC DIAGNOSIS: NORMAL
GROSS: NORMAL
Lab: NORMAL
MICROSCOPIC EXAM: NORMAL

## 2024-06-19 ENCOUNTER — OFFICE VISIT (OUTPATIENT)
Dept: SURGERY | Facility: CLINIC | Age: 69
End: 2024-06-19
Payer: MEDICARE

## 2024-06-19 VITALS
OXYGEN SATURATION: 96 % | HEART RATE: 81 BPM | TEMPERATURE: 97 F | RESPIRATION RATE: 16 BRPM | BODY MASS INDEX: 32.58 KG/M2 | WEIGHT: 245.81 LBS | HEIGHT: 73 IN | DIASTOLIC BLOOD PRESSURE: 64 MMHG | SYSTOLIC BLOOD PRESSURE: 124 MMHG

## 2024-06-19 DIAGNOSIS — C18.6 MALIGNANT NEOPLASM OF DESCENDING COLON: ICD-10-CM

## 2024-06-19 DIAGNOSIS — C20 RECTAL CANCER: Primary | ICD-10-CM

## 2024-06-19 PROCEDURE — 1159F MED LIST DOCD IN RCRD: CPT | Mod: CPTII,S$GLB,, | Performed by: COLON & RECTAL SURGERY

## 2024-06-19 PROCEDURE — 99215 OFFICE O/P EST HI 40 MIN: CPT | Mod: S$GLB,,, | Performed by: COLON & RECTAL SURGERY

## 2024-06-19 PROCEDURE — 3074F SYST BP LT 130 MM HG: CPT | Mod: CPTII,S$GLB,, | Performed by: COLON & RECTAL SURGERY

## 2024-06-19 PROCEDURE — 3066F NEPHROPATHY DOC TX: CPT | Mod: CPTII,S$GLB,, | Performed by: COLON & RECTAL SURGERY

## 2024-06-19 PROCEDURE — 99999 PR PBB SHADOW E&M-EST. PATIENT-LVL V: CPT | Mod: PBBFAC,,, | Performed by: COLON & RECTAL SURGERY

## 2024-06-19 PROCEDURE — 1126F AMNT PAIN NOTED NONE PRSNT: CPT | Mod: CPTII,S$GLB,, | Performed by: COLON & RECTAL SURGERY

## 2024-06-19 PROCEDURE — 3061F NEG MICROALBUMINURIA REV: CPT | Mod: CPTII,S$GLB,, | Performed by: COLON & RECTAL SURGERY

## 2024-06-19 PROCEDURE — 4010F ACE/ARB THERAPY RXD/TAKEN: CPT | Mod: CPTII,S$GLB,, | Performed by: COLON & RECTAL SURGERY

## 2024-06-19 PROCEDURE — 3008F BODY MASS INDEX DOCD: CPT | Mod: CPTII,S$GLB,, | Performed by: COLON & RECTAL SURGERY

## 2024-06-19 PROCEDURE — 1101F PT FALLS ASSESS-DOCD LE1/YR: CPT | Mod: CPTII,S$GLB,, | Performed by: COLON & RECTAL SURGERY

## 2024-06-19 PROCEDURE — 3288F FALL RISK ASSESSMENT DOCD: CPT | Mod: CPTII,S$GLB,, | Performed by: COLON & RECTAL SURGERY

## 2024-06-19 PROCEDURE — 3046F HEMOGLOBIN A1C LEVEL >9.0%: CPT | Mod: CPTII,S$GLB,, | Performed by: COLON & RECTAL SURGERY

## 2024-06-19 PROCEDURE — 3078F DIAST BP <80 MM HG: CPT | Mod: CPTII,S$GLB,, | Performed by: COLON & RECTAL SURGERY
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
## 2024-06-29 ENCOUNTER — HOSPITAL ENCOUNTER (OUTPATIENT)
Dept: RADIOLOGY | Facility: HOSPITAL | Age: 69
Discharge: HOME | End: 2024-06-29
Payer: MEDICARE

## 2024-06-29 DIAGNOSIS — C20 RECTAL CANCER: ICD-10-CM

## 2024-07-02 ENCOUNTER — TELEPHONE (OUTPATIENT)
Dept: SURGERY | Facility: CLINIC | Age: 69
End: 2024-07-02
Payer: MEDICARE

## 2024-07-02 NOTE — TELEPHONE ENCOUNTER
Spoke with wife. States that they were late for the MRI and it was cancelled. Will need to be rescheduled for [facility]. 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

## 2024-07-02 NOTE — TELEPHONE ENCOUNTER
Spoke with wife, reviewed location of MRI scheduled at imaging center across the street for Saturday 7/6. Instructed to arrive for 10a.

## 2024-07-02 NOTE — TELEPHONE ENCOUNTER
----- Message from Samantha Brown sent at 7/2/2024  1:39 PM CDT -----  Type: Needs Medical Advice  Who Called:  Maria Guadalupe(spouse)  Symptoms (please be specific):    How long has patient had these symptoms:    Pharmacy name and phone #:    Best Call Back Number:   Additional Information: Maria Guadalupe is requesting a call back from the nurse regarding her .

## 2024-07-06 ENCOUNTER — HOSPITAL ENCOUNTER (OUTPATIENT)
Dept: RADIOLOGY | Facility: HOSPITAL | Age: 69
Discharge: HOME | End: 2024-07-06
Payer: MEDICARE

## 2024-07-06 PROCEDURE — 72195 MRI PELVIS W/O DYE: CPT | Mod: 26,,, | Performed by: RADIOLOGY

## 2024-07-06 PROCEDURE — 72195 MRI PELVIS W/O DYE: CPT | Mod: TC

## 2024-08-26 ENCOUNTER — PATIENT MESSAGE (OUTPATIENT)
Dept: SURGERY | Facility: CLINIC | Age: 69
End: 2024-08-26
Payer: MEDICARE

## 2024-08-28 PROBLEM — I10 ESSENTIAL HYPERTENSION: Status: ACTIVE | Noted: 2023-03-28

## 2024-08-29 ENCOUNTER — TELEPHONE (OUTPATIENT)
Dept: SURGERY | Facility: CLINIC | Age: 69
End: 2024-08-29
Payer: MEDICARE

## 2024-08-29 NOTE — TELEPHONE ENCOUNTER
RN returned patient's call -- per Dr Hong pt appointment scheduled for 9/4 at 1pm -- Pt voiced understanding of appointment time and location.       ----- Message from Kami Castro LPN sent at 8/29/2024  1:54 PM CDT -----  Regarding: FW: Returning call    ----- Message -----  From: [place]  Sent: 8/29/2024   9:02 AM CDT  To: [facility] Staff  Subject: Returning call                                   Type:  Patient Returning Call    Who Called:PT    Who Left Message for Patient:Lisa/Ira    Does the patient know what this is regarding?:appt    Would the patient rather a call back or a response via [facility]? Call back    Best Call Back Number:[phone]    Additional Information:   Please advise -- Thank you

## 2024-09-04 ENCOUNTER — OFFICE VISIT (OUTPATIENT)
Dept: SURGERY | Facility: CLINIC | Age: 69
End: 2024-09-04
Payer: MEDICARE

## 2024-09-04 VITALS
OXYGEN SATURATION: 97 % | RESPIRATION RATE: 16 BRPM | WEIGHT: 243.81 LBS | TEMPERATURE: 98 F | HEIGHT: 73 IN | HEART RATE: 84 BPM | SYSTOLIC BLOOD PRESSURE: 126 MMHG | DIASTOLIC BLOOD PRESSURE: 68 MMHG | BODY MASS INDEX: 32.31 KG/M2

## 2024-09-04 DIAGNOSIS — C20 RECTAL CANCER: Primary | ICD-10-CM

## 2024-09-04 DIAGNOSIS — C18.6 MALIGNANT NEOPLASM OF DESCENDING COLON: ICD-10-CM

## 2024-09-04 PROCEDURE — 1159F MED LIST DOCD IN RCRD: CPT | Mod: CPTII,S$GLB,, | Performed by: COLON & RECTAL SURGERY

## 2024-09-04 PROCEDURE — 99215 OFFICE O/P EST HI 40 MIN: CPT | Mod: S$GLB,,, | Performed by: COLON & RECTAL SURGERY

## 2024-09-04 PROCEDURE — 99999 PR PBB SHADOW E&M-EST. PATIENT-LVL V: CPT | Mod: PBBFAC,,, | Performed by: COLON & RECTAL SURGERY

## 2024-09-04 PROCEDURE — 3008F BODY MASS INDEX DOCD: CPT | Mod: CPTII,S$GLB,, | Performed by: COLON & RECTAL SURGERY

## 2024-09-04 PROCEDURE — 3074F SYST BP LT 130 MM HG: CPT | Mod: CPTII,S$GLB,, | Performed by: COLON & RECTAL SURGERY

## 2024-09-04 PROCEDURE — 1101F PT FALLS ASSESS-DOCD LE1/YR: CPT | Mod: CPTII,S$GLB,, | Performed by: COLON & RECTAL SURGERY

## 2024-09-04 PROCEDURE — 4010F ACE/ARB THERAPY RXD/TAKEN: CPT | Mod: CPTII,S$GLB,, | Performed by: COLON & RECTAL SURGERY

## 2024-09-04 PROCEDURE — 1126F AMNT PAIN NOTED NONE PRSNT: CPT | Mod: CPTII,S$GLB,, | Performed by: COLON & RECTAL SURGERY

## 2024-09-04 PROCEDURE — 3078F DIAST BP <80 MM HG: CPT | Mod: CPTII,S$GLB,, | Performed by: COLON & RECTAL SURGERY

## 2024-09-04 PROCEDURE — 3061F NEG MICROALBUMINURIA REV: CPT | Mod: CPTII,S$GLB,, | Performed by: COLON & RECTAL SURGERY

## 2024-09-04 PROCEDURE — 3066F NEPHROPATHY DOC TX: CPT | Mod: CPTII,S$GLB,, | Performed by: COLON & RECTAL SURGERY

## 2024-09-04 PROCEDURE — 3288F FALL RISK ASSESSMENT DOCD: CPT | Mod: CPTII,S$GLB,, | Performed by: COLON & RECTAL SURGERY

## 2024-09-04 PROCEDURE — 3052F HG A1C>EQUAL 8.0%<EQUAL 9.0%: CPT | Mod: CPTII,S$GLB,, | Performed by: COLON & RECTAL SURGERY
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
## 2024-09-19 ENCOUNTER — TELEPHONE (OUTPATIENT)
Dept: SURGERY | Facility: CLINIC | Age: 69
End: 2024-09-19
Payer: MEDICARE

## 2024-09-19 DIAGNOSIS — C20 RECTAL CANCER: Primary | ICD-10-CM

## 2024-09-19 NOTE — TELEPHONE ENCOUNTER
Preop and imaging appointments made and reviewed with patient. Pt voiced understanding of times and locations.      ----- Message from Kami Castro LPN sent at 9/19/2024  2:21 PM CDT -----    ----- Message -----  From: [place]  Sent: 9/19/2024   2:19 PM CDT  To: [facility] Staff    Type:  Needs Medical Advice    Who Called: wife Maria Guadalupe        Would the patient rather a call back or a response via [facility]? Call back    Best Call Back Number: [phone]      Additional Information: Sts they don't know why its taking so long to get his MRI's and Ct's done before his surgery and wants to know what the hold up is.   Please advise -- Thank you

## 2024-09-25 ENCOUNTER — OFFICE VISIT (OUTPATIENT)
Dept: SURGERY | Facility: CLINIC | Age: 69
End: 2024-09-25
Payer: MEDICARE

## 2024-09-25 ENCOUNTER — TELEPHONE (OUTPATIENT)
Dept: SURGERY | Facility: CLINIC | Age: 69
End: 2024-09-25
Payer: MEDICARE

## 2024-09-25 VITALS
HEIGHT: 73 IN | DIASTOLIC BLOOD PRESSURE: 70 MMHG | OXYGEN SATURATION: 97 % | SYSTOLIC BLOOD PRESSURE: 131 MMHG | BODY MASS INDEX: 31.85 KG/M2 | RESPIRATION RATE: 17 BRPM | WEIGHT: 240.31 LBS | TEMPERATURE: 97 F | HEART RATE: 76 BPM

## 2024-09-25 DIAGNOSIS — C18.4 MALIGNANT NEOPLASM OF TRANSVERSE COLON: Primary | ICD-10-CM

## 2024-09-25 DIAGNOSIS — Z08 ENCOUNTER FOR FOLLOW-UP SURVEILLANCE OF RECTAL CANCER: ICD-10-CM

## 2024-09-25 DIAGNOSIS — Z85.048 ENCOUNTER FOR FOLLOW-UP SURVEILLANCE OF RECTAL CANCER: ICD-10-CM

## 2024-09-25 DIAGNOSIS — C20 RECTAL CANCER: Primary | ICD-10-CM

## 2024-09-25 PROCEDURE — 3075F SYST BP GE 130 - 139MM HG: CPT | Mod: CPTII,S$GLB,, | Performed by: COLON & RECTAL SURGERY

## 2024-09-25 PROCEDURE — 45330 DIAGNOSTIC SIGMOIDOSCOPY: CPT | Mod: S$GLB,,, | Performed by: COLON & RECTAL SURGERY

## 2024-09-25 PROCEDURE — 1159F MED LIST DOCD IN RCRD: CPT | Mod: CPTII,S$GLB,, | Performed by: COLON & RECTAL SURGERY

## 2024-09-25 PROCEDURE — 1125F AMNT PAIN NOTED PAIN PRSNT: CPT | Mod: CPTII,S$GLB,, | Performed by: COLON & RECTAL SURGERY

## 2024-09-25 PROCEDURE — 3008F BODY MASS INDEX DOCD: CPT | Mod: CPTII,S$GLB,, | Performed by: COLON & RECTAL SURGERY

## 2024-09-25 PROCEDURE — 3078F DIAST BP <80 MM HG: CPT | Mod: CPTII,S$GLB,, | Performed by: COLON & RECTAL SURGERY

## 2024-09-25 PROCEDURE — 3066F NEPHROPATHY DOC TX: CPT | Mod: CPTII,S$GLB,, | Performed by: COLON & RECTAL SURGERY

## 2024-09-25 PROCEDURE — 3288F FALL RISK ASSESSMENT DOCD: CPT | Mod: CPTII,S$GLB,, | Performed by: COLON & RECTAL SURGERY

## 2024-09-25 PROCEDURE — 1101F PT FALLS ASSESS-DOCD LE1/YR: CPT | Mod: CPTII,S$GLB,, | Performed by: COLON & RECTAL SURGERY

## 2024-09-25 PROCEDURE — 99215 OFFICE O/P EST HI 40 MIN: CPT | Mod: 25,S$GLB,, | Performed by: COLON & RECTAL SURGERY

## 2024-09-25 PROCEDURE — 3061F NEG MICROALBUMINURIA REV: CPT | Mod: CPTII,S$GLB,, | Performed by: COLON & RECTAL SURGERY

## 2024-09-25 PROCEDURE — 3052F HG A1C>EQUAL 8.0%<EQUAL 9.0%: CPT | Mod: CPTII,S$GLB,, | Performed by: COLON & RECTAL SURGERY

## 2024-09-25 PROCEDURE — 4010F ACE/ARB THERAPY RXD/TAKEN: CPT | Mod: CPTII,S$GLB,, | Performed by: COLON & RECTAL SURGERY

## 2024-09-25 PROCEDURE — 99999 PR PBB SHADOW E&M-EST. PATIENT-LVL IV: CPT | Mod: PBBFAC,,, | Performed by: COLON & RECTAL SURGERY

## 2024-09-25 RX ORDER — POLYETHYLENE GLYCOL 3350, SODIUM SULFATE ANHYDROUS, SODIUM BICARBONATE, SODIUM CHLORIDE, POTASSIUM CHLORIDE 236; 22.74; 6.74; 5.86; 2.97 G/4L; G/4L; G/4L; G/4L; G/4L
4 POWDER, FOR SOLUTION ORAL ONCE
Qty: 4000 ML | Refills: 0 | Status: SHIPPED | OUTPATIENT
Start: 2024-09-25 | End: 2024-09-25

## 2024-09-25 RX ORDER — POLYETHYLENE GLYCOL 3350, SODIUM SULFATE ANHYDROUS, SODIUM BICARBONATE, SODIUM CHLORIDE, POTASSIUM CHLORIDE 236; 22.74; 6.74; 5.86; 2.97 G/4L; G/4L; G/4L; G/4L; G/4L
4 POWDER, FOR SOLUTION ORAL ONCE
COMMUNITY
End: 2024-09-25 | Stop reason: SDUPTHER

## 2024-09-25 RX ORDER — METRONIDAZOLE 500 MG/1
500 TABLET ORAL 2 TIMES DAILY
Qty: 2 TABLET | Refills: 0 | Status: SHIPPED | OUTPATIENT
Start: 2024-09-25

## 2024-09-25 RX ORDER — CIPROFLOXACIN 500 MG/1
500 TABLET ORAL 2 TIMES DAILY
Qty: 2 TABLET | Refills: 0 | Status: SHIPPED | OUTPATIENT
Start: 2024-09-25
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
## 2024-09-27 ENCOUNTER — OFFICE VISIT (OUTPATIENT)
Dept: INTERNAL MEDICINE | Facility: CLINIC | Age: 69
End: 2024-09-27
Payer: MEDICARE

## 2024-09-27 VITALS
TEMPERATURE: 98 F | RESPIRATION RATE: 18 BRPM | SYSTOLIC BLOOD PRESSURE: 121 MMHG | DIASTOLIC BLOOD PRESSURE: 70 MMHG | OXYGEN SATURATION: 98 % | HEART RATE: 65 BPM | WEIGHT: 242 LBS | BODY MASS INDEX: 32.78 KG/M2 | HEIGHT: 72 IN

## 2024-09-27 DIAGNOSIS — M54.50 CHRONIC BILATERAL LOW BACK PAIN WITHOUT SCIATICA: ICD-10-CM

## 2024-09-27 DIAGNOSIS — G62.0 NEUROPATHY DUE TO CHEMOTHERAPEUTIC DRUG: ICD-10-CM

## 2024-09-27 DIAGNOSIS — Z01.818 PREOP EXAMINATION: Primary | ICD-10-CM

## 2024-09-27 DIAGNOSIS — Z86.16 HISTORY OF COVID-19: ICD-10-CM

## 2024-09-27 DIAGNOSIS — K21.9 GASTROESOPHAGEAL REFLUX DISEASE, UNSPECIFIED WHETHER ESOPHAGITIS PRESENT: ICD-10-CM

## 2024-09-27 DIAGNOSIS — Z86.718 HISTORY OF DVT (DEEP VEIN THROMBOSIS): ICD-10-CM

## 2024-09-27 DIAGNOSIS — Z86.79 HISTORY OF RHEUMATIC FEVER: ICD-10-CM

## 2024-09-27 DIAGNOSIS — T45.1X5A NEUROPATHY DUE TO CHEMOTHERAPEUTIC DRUG: ICD-10-CM

## 2024-09-27 DIAGNOSIS — G89.29 CHRONIC BILATERAL LOW BACK PAIN WITHOUT SCIATICA: ICD-10-CM

## 2024-09-27 DIAGNOSIS — J32.9 RHINOSINUSITIS: ICD-10-CM

## 2024-09-27 DIAGNOSIS — G47.33 OSA (OBSTRUCTIVE SLEEP APNEA): ICD-10-CM

## 2024-09-27 DIAGNOSIS — I10 ESSENTIAL HYPERTENSION: ICD-10-CM

## 2024-09-27 DIAGNOSIS — I87.2 VENOUS STASIS DERMATITIS OF LEFT LOWER EXTREMITY: ICD-10-CM

## 2024-09-27 DIAGNOSIS — C20 RECTAL CANCER: ICD-10-CM

## 2024-09-27 DIAGNOSIS — K42.9 UMBILICAL HERNIA WITHOUT OBSTRUCTION AND WITHOUT GANGRENE: ICD-10-CM

## 2024-09-27 DIAGNOSIS — Z79.4 TYPE 2 DIABETES MELLITUS WITH DIABETIC POLYNEUROPATHY, WITH LONG-TERM CURRENT USE OF INSULIN: ICD-10-CM

## 2024-09-27 DIAGNOSIS — Z87.448 HISTORY OF HEMATURIA: ICD-10-CM

## 2024-09-27 DIAGNOSIS — E11.42 TYPE 2 DIABETES MELLITUS WITH DIABETIC POLYNEUROPATHY, WITH LONG-TERM CURRENT USE OF INSULIN: ICD-10-CM

## 2024-09-27 DIAGNOSIS — H35.033 HYPERTENSIVE RETINOPATHY OF BOTH EYES: ICD-10-CM

## 2024-09-27 DIAGNOSIS — R74.8 ALKALINE PHOSPHATASE ELEVATION: ICD-10-CM

## 2024-09-27 PROCEDURE — 99999 PR PBB SHADOW E&M-EST. PATIENT-LVL V: CPT | Mod: PBBFAC,,, | Performed by: HOSPITALIST

## 2024-09-27 NOTE — ASSESSMENT & PLAN NOTE
While on anticoagulation  I suggested follow-up for history of hematuria   He is under urology care

## 2024-09-27 NOTE — ASSESSMENT & PLAN NOTE
To his knowledge he did not have any heart or kidney problems that he is aware of     June 2023    Concentric remodeling and normal systolic function.  The estimated ejection fraction is 65%.  Normal left ventricular diastolic function.  Normal right ventricular size with normal right ventricular systolic function.  Normal central venous pressure (3 mmHg).  The estimated PA systolic pressure is 29 mmHg.  Aortic Valve The aortic valve appears structurally normal. There is normal leaflet mobility.   Mitral Valve The mitral valve appears structurally normal. There is normal leaflet mobility.  The estimated mitral valve area by pressure half time is 2.84 cm2.   Tricuspid Valve The tricuspid valve appears structurally normal. There is trace regurgitation. The estimated PA systolic pressure is greater than 26 mmHg.   Pulmonic Valve Pulmonic valve is structurally normal.

## 2024-09-27 NOTE — ASSESSMENT & PLAN NOTE
He has swelling on both legs but sometimes more on the left side   He is not known to have any heart failure liver or kidney failure    It was likely that he has a local cause for the lower extremity swelling  He was working in job involving less physical activity at a desk which could be contributing to likely venous insufficiency    Contributing factors     Varicose veins   Salted food    NSAID use           Edema- I suggested avoidance of added salt,avoidance of NSAID's, unless advised or ordered  and suggested Limb elevation and stocking use

## 2024-09-27 NOTE — ASSESSMENT & PLAN NOTE
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

## 2024-09-27 NOTE — ASSESSMENT & PLAN NOTE
Doing good  Does not sound Cardiac       GERD-  I suggest continuation of the Proton pump inhibitor in the perioperative period . I suggest aspiration precautions

## 2024-09-27 NOTE — ASSESSMENT & PLAN NOTE
He was on metformin in the past but he stopped taking that as it was not working for him  He is currently taking short-acting insulin 3 times a day with meals and long-acting insulin at nighttime  I suggested for him not to take the short-acting insulin on the morning of surgery  I suggested using 80% of the long-acting insulin the night before surgery which will be about 16 units  He is going to be on a liquid diet the night before surgery  Given that he is going to be on a liquid diet the day before surgery as a part of the bowel prep, I suggested using 80% of insulin long-acting the night before surgery as well on the 1st of October  We discussed short-acting insulin use that a before surgery given that he is going to be on liquid diet and he feels comfortable using the short-acting insulin but I suggested to be careful with the glucose    He has a sliding scale to follow and he is comfortable adjusting insulin the day before surgery      Hemoglobin A1c- 8.3  Capillary glucose check-once a day in the morning  Pre breakfast -95- 100  I suggested monitoring glucose during the daytime as the reported pre meal glucose may not correlate with the A1c of 8+  Anemia could be contributing to the A1c aberrancy    Diabetes Complications     Microvascular     Not known to have   Diabetes affecting the eyes, Kidneys   No tingling numbness of  feet  No reported open areas on the feet   Feet care suggested     Macrovascular     No stroke/ TIA  Not known to have CAD  No suggestion of  lower extremity claudication      Diabetes Mellitus-I suggest monitoring the glucose in the perioperative period ( Before meals and bed time,if the patient is on oral feeds or every 6 hourly ,if the patient is NPO )  Blood glucose target in hospitalized patients is 140-180. Oral Hypoglycemic agents are generally avoided during the hospital stay . If glucose is consistently elevated ,I suggest using basal ,prandial Insulin regimen to control the  glucose , as elevated glucose can be associated with adverse surgical out comes. Please consider involving Hospital Medicine or Endocrinology ,if any help is needed with Glucose control. Patient will be instructed based on the pre op clinic guidelines  about adjustment of diabetic treatment (If applicable )  considering the NPO status for Surgery      I had educated that uncontrolled DM can cause post op complications,risk of infection, wound healing problem,increased length of stay in hospital and its associated complications.I suggest exercise as much as possible and follow diabetic diet

## 2024-09-27 NOTE — HPI
History of present illness- I had the pleasure of meeting this pleasant 68 y.o. gentleman in the pre op clinic prior to his elective Abdominal surgery. The patient is new to me .   Offered to have family to be on the phone during the consultation      I have obtained the history by speaking to the patient and by reviewing the electronic health records.    Events leading up to surgery / History of presenting illness -    Rectal cancer     He is known to have rectal cancer and was diagnosed with rectal cancer in March of 2022-based on a routine colonoscopies- at about age 66 years  That was his 1st colonoscopy   He completed chemo and radiation on September 20, 2022    s/p low anterior resection of rectal lesion April 2023.    He was found to have a new rectal cancer on regular follow up colonoscopies\    He is planning on having surgery done for this on October 3rd      Since he had his surgery, his bowels or of a softer consistency-in his description chocolate pudding-he has bowel movements a few times a day since he had the surgery  No rectal bleeding that he could tell    He had radiation therapy for rectal cancer for 3 weeks 2 times a week that he finished about 1 month ago  He tolerated the radiation fairly well  He has not had any chemotherapy for this rectal cancer      Relevant health conditions of significance for the perioperative period/ History of presenting illness -    Type 2 diabetes  Obstructive sleep apnea  HTN  Deep vein thrombosis  BMI 32.82  COVID infection-last episode was early part of 2024, 1st episode was around 2020  Edema    Not known to have  heart problem ,  Lung problem, Thyroid problem, Kidney problem,  pulmonary embolism,  fatty liver , blood vessels stent , tobacco smoking, , mental health problems     Lives with wife   Single  story house   Currently not working and he plans on working once she recovers from the surgery  Pets-1 dog  Children - 2 grown sons who live relatively close  by  Has help post op

## 2024-09-27 NOTE — OUTPATIENT SUBJECTIVE & OBJECTIVE
Outpatient Subjective & Objective     Chief complaint-Preoperative evaluation, Perioperative Medical management, complication reduction plan     Active cardiac conditions- none    Revised cardiac risk index predictors- high-risk type of surgery and insulin requiring diabetes mellitus    Functional capacity -Examples of physical activity  -limited due to back problems , arthritis, radiation related pain which is getting better can take 1 flight of stairs----- He can undertake all the above activities without  chest pain,chest tightness, Shortness of breath ,dizziness,lightheadedness making his exercise tolerance more,  than 4 Mets.       Review of Systems   Constitutional:  Negative for chills and fever.        Thankfully he did not have significant weight loss from chemotherapy   HENT:          Sleep apnea   Eyes:         No unusual vision changes   Respiratory:          No cough , phlegm     No Hemoptysis   Cardiovascular:         As noted   Gastrointestinal:           No overt GI/ blood losses   Endocrine:        Prednisone use > 20 mg daily for 3 weeks- none   Genitourinary:  Negative for dysuria.        No urinary hesitancy    Musculoskeletal:         As above      Skin:  Negative for rash.   Neurological:  Negative for syncope.        No unilateral weakness   Hematological:         Current use of Anticoagulants  None  Takes anti-inflammatory naproxen 2 times a week- holding it for surgery  I suggested care with anti-inflammatory medication use as it can cause liver kidney and also problems   Psychiatric/Behavioral:          No Depression,Anxiety                 No anesthesia, bleeding, cardiac problems, PONV with previous surgeries/procedures.  Medications and Allergies reviewed in epic.     FH- No anesthesia,bleeding / venous thrombosis , in family      Physical Exam  Blood pressure 121/70, pulse 65, temperature 97.8 °F (36.6 °C), temperature source Oral, resp. rate 18, height 6' (1.829 m), weight 109.8 kg  (242 lb), SpO2 98%.      Physical Exam  Constitutional- Vitals - Body mass index is 32.82 kg/m².,   Vitals:    09/27/24 0923   BP: 121/70   Pulse: 65   Resp: 18   Temp: 97.8 °F (36.6 °C)     General appearance-Conscious,Coherent  Eyes- No conjunctival icterus,pupils  round , reactive to light , and  bilateral intra ocular lenses  ENT-Oral cavity- moist    , Hearing grossly normal   Neck- No thyromegaly ,Trachea -central, No jugular venous distension,   No Carotid Bruit   Cardiovascular -Heart Sounds- Normal  and  no murmur   , No gallop rhythm   Respiratory - Normal Respiratory Effort, Normal breath sounds, crepitations bases,  no wheeze , and  no forced expiratory wheeze    Peripheral pitting pedal edema-- mild, no calf pain   Gastrointestinal -Soft abdomen, No palpable masses, Non Tender,Liver,Spleen not palpable. No-- free fluid and shifting dullness  Musculoskeletal- No finger Clubbing. Strength grossly normal   Lymphatic-No Palpable cervical, axillary,Inguinal lymphadenopathy   Psychiatric - normal effect,Orientation  Rt Dorsalis pedis pulses-palpable    Lt Dorsalis pedis pulses- palpable   Rt Posterior tibial pulses -palpable   Left posterior tibial pulses -palpable   Miscellaneous -  Surgical scarabdomen  and  no renal bruit   Investigations  Lab and Imaging have been reviewed in [facility].    Review of Medicine tests    EKG- I had independently reviewed the EKG from--3/28/2023  It was reported to be showing     Normal sinus rhythm   Normal ECG   When compared with ECG of 10-RAMY-2022 17:00,   Non-specific change in ST segment in Anterior leads   Nonspecific T wave abnormality no longer evident in Anterior-lateral leads     Review of clinical lab tests:  Lab Results   Component Value Date    CREATININE 0.82 08/28/2024    HGB 13.4 (L) 08/28/2024     08/28/2024         Review of old records- Was done and information gathered regards to events leading to surgery and health conditions of significance in the  perioperative period         Review of old records- Was done and information gathered regards to events leading to surgery and health conditions of significance in the perioperative period.    Outpatient Subjective & Objective

## 2024-09-27 NOTE — ASSESSMENT & PLAN NOTE
June 2023  Nonocclusive thrombus in the left femoral vein.     Oct 2023    Essentially stable appearance of the left lower extremity venous system with persistent nonocclusive thrombus throughout the left femoral vein without interval progression. Otherwise, bilateral lower extremity veins are patent.     April 2024       1. Residual chronic nonocclusive thrombus and/or fibers stranding within the mid and distal left femoral vein, unchanged in extent when compared to the prior study.  2. No evidence of acute deep venous thrombosis in the right lower extremity veins.    History of     PE- None  Episode- 1  Associated with reduced mobility around the time of the thrombosis, no long journeys, cancer around the time of thrombosis, prior surgery around the time thrombosis, Hospital stay around the time of thrombosis, no HRT  Anticoagulated with Eliquis for few months  IVC filter  - none    His apixaban/Eliquis was discontinued a few months after the commencement asked he passed blood in the urine  He no longer has blood in the urine but I suggested follow up for that    I have discussed with him that his blood clot was provoked from cancer and surgery    He is at risk of having blood clot with the upcoming surgery and I will work with his hematologist and cardiologist's and surgeon to check to see if prophylactic anticoagulation is indicated here

## 2024-09-27 NOTE — ASSESSMENT & PLAN NOTE
He is taking blood pressure medication in the morning and is doing good from a blood pressure standpoint      Hypertension-  Blood pressure is acceptable . . I suggest holding ----olmesartan---- on the morning of the surgery and can continue that  post operatively under blood pressure, electrolyte and renal function monitoring as long as they are acceptable.I suggest addressing pain control as uncontrolled pain can increased blood pressure

## 2024-09-27 NOTE — ASSESSMENT & PLAN NOTE
He is feeling better from a neuropathy standpoint- he has manifestations in his hands and he feels like soap on his hands-he is doing better from this standpoint and he feels that the B complex is helping him  He feels that this is not diabetes related

## 2024-09-27 NOTE — ASSESSMENT & PLAN NOTE
Most recent blood count that I saw from August showed mildly low hemoglobin    No overt GI/ blood losses  No stomach upset/ ulcer      Gets colonoscopy  NSAID use  Discussed follow up     I suggest monitoring the hemoglobin in the perioperative period     He is current hemoglobin trend is not concerning which I have reassured to him

## 2024-09-27 NOTE — PROGRESS NOTES
[facility]  Progress Note    Patient Name: KIRSTY Mason  MRN: [id number]  Date of Evaluation- 09/27/2024  PCP- Jass Mcdermott II, MD    Future cases for KIRSTY Mason [[id number]]       Case ID Status Date Time Eamon Procedure Provider Location    [id number] [facility] 10/3/2024  7:00  RESECTION, RECTUM, LOW ANTERIOR MEHGAN Hong MD [[id number]] [facility] OR 2ND FLR            HPI:  History of present illness- I had the pleasure of meeting this pleasant 68 y.o. gentleman in the pre op clinic prior to his elective Abdominal surgery. The patient is new to me .   Offered to have family to be on the phone during the consultation      I have obtained the history by speaking to the patient and by reviewing the electronic health records.    Events leading up to surgery / History of presenting illness -    Rectal cancer     He is known to have rectal cancer and was diagnosed with rectal cancer in March of 2022-based on a routine colonoscopies- at about age 66 years  That was his 1st colonoscopy   He completed chemo and radiation on September 20, 2022    s/p low anterior resection of rectal lesion April 2023.    He was found to have a new rectal cancer on regular follow up colonoscopies\    He is planning on having surgery done for this on October 3rd      Since he had his surgery, his bowels or of a softer consistency-in his description chocolate pudding-he has bowel movements a few times a day since he had the surgery  No rectal bleeding that he could tell    He had radiation therapy for rectal cancer for 3 weeks 2 times a week that he finished about 1 month ago  He tolerated the radiation fairly well  He has not had any chemotherapy for this rectal cancer      Relevant health conditions of significance for the perioperative period/ History of presenting illness -    Type 2 diabetes  Obstructive sleep apnea  HTN  Deep vein thrombosis  BMI 32.82  COVID infection-last episode was early part of 2024, 1st episode was around  2020  Edema    Not known to have  heart problem ,  Lung problem, Thyroid problem, Kidney problem,  pulmonary embolism,  fatty liver , blood vessels stent , tobacco smoking, , mental health problems     Lives with wife   Single  story house   Currently not working and he plans on working once she recovers from the surgery  Pets-1 dog  Children - 2 grown sons who live relatively close by  Has help post op            Subjective/ Objective:     Chief complaint-Preoperative evaluation, Perioperative Medical management, complication reduction plan     Active cardiac conditions- none    Revised cardiac risk index predictors- high-risk type of surgery and insulin requiring diabetes mellitus    Functional capacity -Examples of physical activity  -limited due to back problems , arthritis, radiation related pain which is getting better can take 1 flight of stairs----- He can undertake all the above activities without  chest pain,chest tightness, Shortness of breath ,dizziness,lightheadedness making his exercise tolerance more,  than 4 Mets.       Review of Systems   Constitutional:  Negative for chills and fever.        Thankfully he did not have significant weight loss from chemotherapy   HENT:          Sleep apnea   Eyes:         No unusual vision changes   Respiratory:          No cough , phlegm     No Hemoptysis   Cardiovascular:         As noted   Gastrointestinal:           No overt GI/ blood losses   Endocrine:        Prednisone use > 20 mg daily for 3 weeks- none   Genitourinary:  Negative for dysuria.        No urinary hesitancy    Musculoskeletal:         As above      Skin:  Negative for rash.   Neurological:  Negative for syncope.        No unilateral weakness   Hematological:         Current use of Anticoagulants  None  Takes anti-inflammatory naproxen 2 times a week- holding it for surgery  I suggested care with anti-inflammatory medication use as it can cause liver kidney and also problems    Psychiatric/Behavioral:          No Depression,Anxiety                 No anesthesia, bleeding, cardiac problems, PONV with previous surgeries/procedures.  Medications and Allergies reviewed in epic.     FH- No anesthesia,bleeding / venous thrombosis , in family      Physical Exam  Blood pressure 121/70, pulse 65, temperature 97.8 °F (36.6 °C), temperature source Oral, resp. rate 18, height 6' (1.829 m), weight 109.8 kg (242 lb), SpO2 98%.      Physical Exam  Constitutional- Vitals - Body mass index is 32.82 kg/m².,   Vitals:    09/27/24 0923   BP: 121/70   Pulse: 65   Resp: 18   Temp: 97.8 °F (36.6 °C)     General appearance-Conscious,Coherent  Eyes- No conjunctival icterus,pupils  round , reactive to light , and  bilateral intra ocular lenses  ENT-Oral cavity- moist    , Hearing grossly normal   Neck- No thyromegaly ,Trachea -central, No jugular venous distension,   No Carotid Bruit   Cardiovascular -Heart Sounds- Normal  and  no murmur   , No gallop rhythm   Respiratory - Normal Respiratory Effort, Normal breath sounds, crepitations bases,  no wheeze , and  no forced expiratory wheeze    Peripheral pitting pedal edema-- mild, no calf pain   Gastrointestinal -Soft abdomen, No palpable masses, Non Tender,Liver,Spleen not palpable. No-- free fluid and shifting dullness  Musculoskeletal- No finger Clubbing. Strength grossly normal   Lymphatic-No Palpable cervical, axillary,Inguinal lymphadenopathy   Psychiatric - normal effect,Orientation  Rt Dorsalis pedis pulses-palpable    Lt Dorsalis pedis pulses- palpable   Rt Posterior tibial pulses -palpable   Left posterior tibial pulses -palpable   Miscellaneous -  Surgical scarabdomen  and  no renal bruit   Investigations  Lab and Imaging have been reviewed in [facility].    Review of Medicine tests    EKG- I had independently reviewed the EKG from--3/28/2023  It was reported to be showing     Normal sinus rhythm   Normal ECG   When compared with ECG of 10-RAMY-2022 17:00,    Non-specific change in ST segment in Anterior leads   Nonspecific T wave abnormality no longer evident in Anterior-lateral leads     Review of clinical lab tests:  Lab Results   Component Value Date    CREATININE 0.82 08/28/2024    HGB 13.4 (L) 08/28/2024     08/28/2024         Review of old records- Was done and information gathered regards to events leading to surgery and health conditions of significance in the perioperative period         Review of old records- Was done and information gathered regards to events leading to surgery and health conditions of significance in the perioperative period.        Preoperative cardiac risk assessment-  The patient does not have any active cardiac conditions . Revised cardiac risk index predictors-2 ---.Functional capacity is more than 4 Mets. He will be undergoing a Abdominal procedure that carries a High Risk risk     Risk of a major Cardiac event ( Defined as death, myocardial infarction, or cardiac arrest at 30 days after noncardiac surgery), based on RCRI score     10.1%          No further cardiac work up is indicated prior to proceeding with the surgery     Orders Placed This Encounter    Ambulatory referral/consult to Urology       American Society of Anesthesiologists Physical status classification ( ASA ) class: 3     Postoperative pulmonary complication risk assessment:      ARISCAT ( Canet) risk index- risk class -  Low, if duration of surgery is under 3 hours, intermediate, if duration of surgery is over 3 hours          Assessment/Plan:     Neuropathy due to chemotherapeutic drug  He is feeling better from a neuropathy standpoint- he has manifestations in his hands and he feels like soap on his hands-he is doing better from this standpoint and he feels that the B complex is helping him  He feels that this is not diabetes related        Hypertensive retinopathy of both eyes  He recently had an eye evaluation and to his understanding is doing good from  an eye standpoint  No sudden visual changes    Rhinosinusitis  He is currently not trouble with allergies    Doing good from an allergy standpoint and does not have any asthma or eczema     Venous stasis dermatitis of left lower extremity  He has swelling on both legs but sometimes more on the left side   He is not known to have any heart failure liver or kidney failure    It was likely that he has a local cause for the lower extremity swelling  He was working in job involving less physical activity at a desk which could be contributing to likely venous insufficiency    Contributing factors     Varicose veins   Salted food    NSAID use           Edema- I suggested avoidance of added salt,avoidance of NSAID's, unless advised or ordered  and suggested Limb elevation and stocking use     Essential hypertension  He is taking blood pressure medication in the morning and is doing good from a blood pressure standpoint      Hypertension-  Blood pressure is acceptable . . I suggest holding ----olmesartan---- on the morning of the surgery and can continue that  post operatively under blood pressure, electrolyte and renal function monitoring as long as they are acceptable.I suggest addressing pain control as uncontrolled pain can increased blood pressure      Rectal cancer  For surgery    Anemia  Most recent blood count that I saw from August showed mildly low hemoglobin    No overt GI/ blood losses  No stomach upset/ ulcer      Gets colonoscopy  NSAID use  Discussed follow up     I suggest monitoring the hemoglobin in the perioperative period     He is current hemoglobin trend is not concerning which I have reassured to him    Type 2 diabetes mellitus with diabetic polyneuropathy, with long-term current use of insulin  He was on metformin in the past but he stopped taking that as it was not working for him  He is currently taking short-acting insulin 3 times a day with meals and long-acting insulin at nighttime  I suggested  for him not to take the short-acting insulin on the morning of surgery  I suggested using 80% of the long-acting insulin the night before surgery which will be about 16 units  He is going to be on a liquid diet the night before surgery  Given that he is going to be on a liquid diet the day before surgery as a part of the bowel prep, I suggested using 80% of insulin long-acting the night before surgery as well on the 1st of October  We discussed short-acting insulin use that a before surgery given that he is going to be on liquid diet and he feels comfortable using the short-acting insulin but I suggested to be careful with the glucose    He has a sliding scale to follow and he is comfortable adjusting insulin the day before surgery      Hemoglobin A1c- 8.3  Capillary glucose check-once a day in the morning  Pre breakfast -95- 100  I suggested monitoring glucose during the daytime as the reported pre meal glucose may not correlate with the A1c of 8+  Anemia could be contributing to the A1c aberrancy    Diabetes Complications     Microvascular     Not known to have   Diabetes affecting the eyes, Kidneys   No tingling numbness of  feet  No reported open areas on the feet   Feet care suggested     Macrovascular     No stroke/ TIA  Not known to have CAD  No suggestion of  lower extremity claudication      Diabetes Mellitus-I suggest monitoring the glucose in the perioperative period ( Before meals and bed time,if the patient is on oral feeds or every 6 hourly ,if the patient is NPO )  Blood glucose target in hospitalized patients is 140-180. Oral Hypoglycemic agents are generally avoided during the hospital stay . If glucose is consistently elevated ,I suggest using basal ,prandial Insulin regimen to control the glucose , as elevated glucose can be associated with adverse surgical out comes. Please consider involving Hospital Medicine or Endocrinology ,if any help is needed with Glucose control. Patient will be  instructed based on the pre op clinic guidelines  about adjustment of diabetic treatment (If applicable )  considering the NPO status for Surgery      I had educated that uncontrolled DM can cause post op complications,risk of infection, wound healing problem,increased length of stay in hospital and its associated complications.I suggest exercise as much as possible and follow diabetic diet             Gastroesophageal reflux disease  Doing good  Does not sound Cardiac       GERD-  I suggest continuation of the Proton pump inhibitor in the perioperative period . I suggest aspiration precautions    MELY (obstructive sleep apnea)  He believes that he does not have sleep apnea and does not snore loudly or stop breathing and sleeping  He sleeps alone    Sleep apnea   Not on  CPAP/BIPAP .   .   Informed the risk of worsening sleep apnea in the perioperative period      I suggest  caution with usage of medication that can cause respiratory suppression in the perioperative period    Avoidance of  supine sleep, weight gain , alcoholic beverages , care with , sedative , CNS depressant use indicated  since all of these can worsen MELY         History of COVID-19  COVID infection-last episode was early part of 2024, 1st episode was around 2020    He was hospitalized with the 1st episode and received oxygen  His 2nd episode was not bad     Had not been vaccinated   Recovered from COVID    COVID screening     No fever   No cough   No SOB  No sore throat   No loss of taste or smell   No muscle aches   No nausea, vomiting , diarrhea     BMI 32.0-32.9,adult  BMI 32.82    Weight related conditions     Known to have     HTN  Type 2 Diabetes   Hyperlipidemia   Sleep apnea   Acid reflux   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  The estimated ejection fraction is 65%.  Normal left ventricular diastolic function.  Normal right ventricular size with normal right ventricular systolic function.  Normal central venous pressure (3 mmHg).  The estimated PA systolic pressure is 29 mmHg.  Aortic Valve The aortic valve appears structurally normal. There is normal leaflet mobility.   Mitral Valve The mitral valve appears structurally normal. There is normal leaflet mobility.  The estimated mitral valve area by pressure half time is 2.84 cm2.   Tricuspid Valve The tricuspid valve appears structurally normal. There is trace regurgitation. The estimated PA systolic pressure is greater than 26 mmHg.   Pulmonic Valve Pulmonic valve is structurally normal.            History of DVT (deep vein thrombosis)  June 2023  Nonocclusive thrombus in the left femoral vein.     Oct 2023    Essentially stable appearance of the left lower extremity venous system with persistent nonocclusive thrombus throughout the left femoral vein without interval progression. Otherwise, bilateral lower extremity veins are patent.     April 2024       1. Residual chronic nonocclusive thrombus and/or fibers stranding within the mid and distal left femoral vein, unchanged in extent when compared to the prior study.  2. No evidence of acute deep venous thrombosis in the right lower extremity veins.    History of     PE- None  Episode- 1  Associated with reduced mobility around the time of the thrombosis, no long journeys, cancer around the time of thrombosis, prior surgery around the time thrombosis, Hospital stay around the time of thrombosis, no HRT  Anticoagulated with Eliquis for few months  IVC filter  - none    His apixaban/Eliquis was discontinued a few months after  the commencement asked he passed blood in the urine  He no longer has blood in the urine but I suggested follow up for that    I have discussed with him that his blood clot was provoked from cancer and surgery    He is at risk of having blood clot with the upcoming surgery and I will work with his hematologist and cardiologist's and surgeon to check to see if prophylactic anticoagulation is indicated here    History of hematuria  While on anticoagulation  I suggested follow-up for history of hematuria   He is under urology care    Alkaline phosphatase elevation  No bony injury, gallbladder problem, jaundice dark urine, pale stool, itching  The alk-phos elevation could be from colon      No history  of cirrhosis of liver or suggestions of Liver  decompensation   No Jaundice , dark urine , pale stool   No easy bleeding or bruising  No varices history      Umbilical hernia  Not troubled with this        Preventive perioperative care    Thromboembolic prophylaxis:  His risk factors for thrombosis include cancer  surgical procedure, previous history of thrombosis, and age.I suggest  thromboembolic prophylaxis ( mechanical/pharmacological, weighing the risk benefits of pharmacological agent use considering renée procedural bleeding )  during the perioperative period.I suggested being active in the post operative period.      Postoperative pulmonary complication prophylaxis-Risk factors for post operative pulmonary complications include ASA class >2 and proximity of the surgical site to the lungs- I suggest use of short acting beta agonist within 30 minutes of intubation, incentive spirometry use, early ambulation, end tidal carbon dioxide monitoring, and pain control so as to avoid diaphragmatic splinting  , oral care , head end of bed elevation      Renal complication prophylaxis-Risk factors for renal complications include diabetes mellitus and hypertension . I suggest keeping him well hydrated in the perioperative  period.I suggested drinking 2 litre's of water a day      Surgical site Infection Prophylaxis-I  suggest appropriate antibiotic for Prophylaxis against Surgical site infections  No reported Staph infection  Skin antibacterial discussed          In view of rectal procedure  the patient  is at risk of postoperative urinary retention.  I suggest avoidance / minimizing the of  Benzodiazepines,Anticholinergic medication,antihistamines ( Benadryl) , if possible in the perioperative period. I suggest using the minimum possible use of opioids for the minimum period of time in the perioperative period. Benadryl avoidance suggested      This visit was focused on Preoperative evaluation, Perioperative Medical management, complication reduction plans. I suggest that the patient follows up with primary care or relevant sub specialists for ongoing health care.    I appreciate the opportunity to be involved in this patients care. Please feel free to contact me if there were any questions about this consultation.    Patient is optimized    Patient/ care giver/ Family member was instructed to call and update me about any changes to health,  medication, office visits ,testing out side of the [facility] , hospitalizations between now and surgery      Awilda Arredondo MD  Internal Medicine  [facility]   Cell Phone- [phone]    Varicose veins right lower extremity    I have spent --110---- minutes of time which includes, time spent to prepare to see the patient , obtaining history ,performing examination, counseling/Educating the patient , Documenting clinical information in the record    --  9/27- 1606     Checked for over-the-counter medication      He is at risk of having blood clot with the upcoming surgery and I will work with his hematologist and cardiologist's and surgeon to check to see if prophylactic anticoagulation is indicated here  Check urology note  Urinary bleeding twice while on  blood thinner Eliquis-since stopping the blood thinner last year he had no recurrence of urinary bleeding    Messaged surgeon, oncologist, cardiologist's about their input about possible prophylactic anticoagulation given the previous history of thrombosis   Requested urology evaluation and urologist's note from the past

## 2024-09-27 NOTE — ASSESSMENT & PLAN NOTE
No bony injury, gallbladder problem, jaundice dark urine, pale stool, itching  The alk-phos elevation could be from colon      No history  of cirrhosis of liver or suggestions of Liver  decompensation   No Jaundice , dark urine , pale stool   No easy bleeding or bruising  No varices history

## 2024-09-27 NOTE — ASSESSMENT & PLAN NOTE
He is currently not trouble with allergies    Doing good from an allergy standpoint and does not have any asthma or eczema

## 2024-09-27 NOTE — ASSESSMENT & PLAN NOTE
COVID infection-last episode was early part of 2024, 1st episode was around 2020    He was hospitalized with the 1st episode and received oxygen  His 2nd episode was not bad     Had not been vaccinated   Recovered from COVID    COVID screening     No fever   No cough   No SOB  No sore throat   No loss of taste or smell   No muscle aches   No nausea, vomiting , diarrhea

## 2024-09-27 NOTE — ASSESSMENT & PLAN NOTE
He recently had an eye evaluation and to his understanding is doing good from an eye standpoint  No sudden visual changes   Occupational Therapy    Visit Type: initial evaluation    Relevant History/Co-morbidities: staghorn calculus s/p L percutaneous nephrolithotomy 1/31. Hx sarcoidosis- on 2 L/min O2 baseline    SUBJECTIVE  Patient agreed to participate in therapy this date.  Patient verbally agrees to allow the following to be present during session: spouse  \"I have to get out of bed again?\"  Patient / Family Goal: return to previous functional status    Pain   Patient reports pain is not an issue/concern.    At onset of session (out of 10): 0    OBJECTIVE     Cognitive Status   Level of Consciousness   - alert  Arousal Alertness   - appropriate responses to stimuli  Affect/Behavior    - cooperative and pleasant  Orientation    - Oriented to: person, situation and time  Functional Communication   - Overall Status: within functional limits    Patient Activity Tolerance: 1 to 1 activity to rest         Bed Mobility  - Supine to sit: contact guard/touching/steadying assist  - Sit to supine: contact guard/touching/steadying assist  CGA for line management and safety    Transfers  Assistive devices: gait belt, 4-wheeled walker  - Sit to stand: contact guard/touching/steadying assist  - Stand to sit: contact guard/touching/steadying assist      Functional Ambulation  - Assistance: contact guard/touching/steadying assist  - Assistive device: 4-wheeled walker and gait belt  Ambulating short distances in room, CGA and line management for safety/stability    Activities of Daily Living (ADLs)  Toileting:   - Toilet transfer:        - Assist: contact guard/touching/steadying assist       - Device: gait belt and 4-wheeled walker       - Equipment: commode over toilet and grab bar use  - Assist: total assist - dependent   - Assist needed for: perineal hygiene  Interventions    Training provided: ADL training, balance retraining, bed mobility training, body mechanics, functional ambulation, transfer training and activity tolerance  Skilled input:  verbal instruction/cues  Verbal Consent: Writer verbally educated and received verbal consent for hand placement, positioning of patient, and techniques to be performed today from patient for clothing adjustments for techniques and therapist position for techniques as described above and how they are pertinent to the patient's plan of care.         Education:   - Present and ready to learn: patient and patient's family  Education provided during session:  - Results of above outlined education: Verbalizes understanding, Demonstrates understanding and Needs reinforcement    ASSESSMENT   Patient will benefit from inpatient skilled therapy to address current assessed functional limitations and impairments.  Interferring components: lines/equipment and decreased activity tolerance    Discharge needs based on today's assessment:  - Current level of function: slightly below baseline level of function  - Therapy needs at discharge: does not require ongoing therapy  - Activities of daily living (ADLs) requiring support at discharge: ambulation, bathing, toileting, dressing, grooming and bed mobility  - Instrumental activities of daily living (IADLs) requiring support at discharge: home management  - Impairments that require further therapy intervention: strength, activity tolerance and balance  AM-PAC  - Prior Level of Function: IND/MOD I ([facility] 22-24)       Key: MOD A=moderate assistance, IND/MOD I=independent/modified independent  - Generalized Current Level of Function     - Current Self-Cares: 18       Scoring Key= >21 Modified Independent; 20-21 Supervision; 18-19 Minimal assist; 13-17 Moderate assist; 9-12 Max assist; <9 Total assist        Personal Occupations Profile Affected: bathing/showering, lower body dressing, upper body dressing, personal hygiene/grooming, toileting/toilet hygiene, functional mobility/transfers, home establishment/managements, community mobility      Clinical decision making: Low - Patient has  few limitations (1-3), comorbidities and/or complexities, as noted in problem focused assessment noted above, that impact their occupational profile.  Resulting in few treatment options and no task modification consistent with low clinical decision making complexity.    PLAN (while hospitalized)  Suggestions for next session as indicated: Consider brining aide for line management, progress safety with mobility/transfers, toileting with pt attempting cares, LB dressing using AE, energy conservation    OT Frequency: 3-5 x per week      PT/OT Mobility Equipment for Discharge: none, owns 4ww  PT/OT ADL Equipment for Discharge: owns shower chair and toilet riser as well as sock aid and reacher  Interventions: functional transfer training, activity tolerance training, therapeutic activity, body mechanics, transfer training, balance and energy conservation  Agreement to plan and goals: patient agrees with goals and treatment plan      GOALS  Review Date: 2/16/2024  Long Term Goals: (to be met by time of discharge from hospital)  Grooming: Patient will complete grooming tasks modified independent.  Upper body dressing: Patient will complete upper body dressing modified independent.  Lower body dressing: Patient will complete lower body dressing modified independent.  Toileting: Patient will complete toileting modified independent.  Bathing: Patient will complete bathingmodified independent Toilet transfer: Patient will complete toilet transfer with modified independent.   Home setting transfer: Patient will complete home setting transfers with modified independent.     Documented in the chart in the following areas: Assessment/Plan.    Patient at End of Session:   Location: in bed  Safety measures: alarm system in place/re-engaged, call light within reach, equipment intact and lines intact  Handoff to: family/caregiver      Therapy procedure time and total treatment time can be found documented on the Time Entry flowsheet

## 2024-09-27 NOTE — ASSESSMENT & PLAN NOTE
He believes that he does not have sleep apnea and does not snore loudly or stop breathing and sleeping  He sleeps alone    Sleep apnea   Not on  CPAP/BIPAP .   .   Informed the risk of worsening sleep apnea in the perioperative period      I suggest  caution with usage of medication that can cause respiratory suppression in the perioperative period    Avoidance of  supine sleep, weight gain , alcoholic beverages , care with , sedative , CNS depressant use indicated  since all of these can worsen MELY

## 2024-09-30 ENCOUNTER — HOSPITAL ENCOUNTER (OUTPATIENT)
Dept: RADIOLOGY | Facility: HOSPITAL | Age: 69
Discharge: HOME | End: 2024-09-30
Attending: COLON & RECTAL SURGERY
Payer: MEDICARE

## 2024-09-30 DIAGNOSIS — C20 RECTAL CANCER: ICD-10-CM

## 2024-09-30 PROCEDURE — 72195 MRI PELVIS W/O DYE: CPT | Mod: 26,,, | Performed by: INTERNAL MEDICINE

## 2024-09-30 PROCEDURE — 72195 MRI PELVIS W/O DYE: CPT | Mod: TC
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
## 2024-10-02 ENCOUNTER — OFFICE VISIT (OUTPATIENT)
Dept: SURGERY | Facility: CLINIC | Age: 69
End: 2024-10-02
Payer: MEDICARE

## 2024-10-02 ENCOUNTER — ANESTHESIA EVENT (OUTPATIENT)
Dept: SURGERY | Facility: HOSPITAL | Age: 69
End: 2024-10-02
Payer: MEDICARE

## 2024-10-02 VITALS
BODY MASS INDEX: 32.69 KG/M2 | OXYGEN SATURATION: 99 % | DIASTOLIC BLOOD PRESSURE: 78 MMHG | HEIGHT: 72 IN | SYSTOLIC BLOOD PRESSURE: 132 MMHG | WEIGHT: 241.38 LBS | HEART RATE: 71 BPM | TEMPERATURE: 96 F | RESPIRATION RATE: 16 BRPM

## 2024-10-02 DIAGNOSIS — C18.6 MALIGNANT NEOPLASM OF DESCENDING COLON: ICD-10-CM

## 2024-10-02 DIAGNOSIS — C20 RECTAL CANCER: Primary | ICD-10-CM

## 2024-10-02 PROCEDURE — 3078F DIAST BP <80 MM HG: CPT | Mod: CPTII,S$GLB,, | Performed by: COLON & RECTAL SURGERY

## 2024-10-02 PROCEDURE — 99999 PR PBB SHADOW E&M-EST. PATIENT-LVL IV: CPT | Mod: PBBFAC,,, | Performed by: COLON & RECTAL SURGERY

## 2024-10-02 PROCEDURE — 1126F AMNT PAIN NOTED NONE PRSNT: CPT | Mod: CPTII,S$GLB,, | Performed by: COLON & RECTAL SURGERY

## 2024-10-02 PROCEDURE — 99215 OFFICE O/P EST HI 40 MIN: CPT | Mod: S$GLB,,, | Performed by: COLON & RECTAL SURGERY

## 2024-10-02 PROCEDURE — 1101F PT FALLS ASSESS-DOCD LE1/YR: CPT | Mod: CPTII,S$GLB,, | Performed by: COLON & RECTAL SURGERY

## 2024-10-02 PROCEDURE — 3061F NEG MICROALBUMINURIA REV: CPT | Mod: CPTII,S$GLB,, | Performed by: COLON & RECTAL SURGERY

## 2024-10-02 PROCEDURE — 3066F NEPHROPATHY DOC TX: CPT | Mod: CPTII,S$GLB,, | Performed by: COLON & RECTAL SURGERY

## 2024-10-02 PROCEDURE — 4010F ACE/ARB THERAPY RXD/TAKEN: CPT | Mod: CPTII,S$GLB,, | Performed by: COLON & RECTAL SURGERY

## 2024-10-02 PROCEDURE — 3288F FALL RISK ASSESSMENT DOCD: CPT | Mod: CPTII,S$GLB,, | Performed by: COLON & RECTAL SURGERY

## 2024-10-02 PROCEDURE — 3075F SYST BP GE 130 - 139MM HG: CPT | Mod: CPTII,S$GLB,, | Performed by: COLON & RECTAL SURGERY

## 2024-10-02 PROCEDURE — 3008F BODY MASS INDEX DOCD: CPT | Mod: CPTII,S$GLB,, | Performed by: COLON & RECTAL SURGERY

## 2024-10-02 PROCEDURE — 3052F HG A1C>EQUAL 8.0%<EQUAL 9.0%: CPT | Mod: CPTII,S$GLB,, | Performed by: COLON & RECTAL SURGERY

## 2024-10-02 PROCEDURE — 1159F MED LIST DOCD IN RCRD: CPT | Mod: CPTII,S$GLB,, | Performed by: COLON & RECTAL SURGERY

## 2024-10-02 RX ORDER — GABAPENTIN 300 MG/1
300 CAPSULE ORAL 3 TIMES DAILY
Status: CANCELLED | OUTPATIENT
Start: 2024-10-02

## 2024-10-03 ENCOUNTER — HOSPITAL ENCOUNTER (INPATIENT)
Facility: HOSPITAL | Age: 69
LOS: 5 days | Discharge: HOME | DRG: 330 | End: 2024-10-08
Attending: COLON & RECTAL SURGERY | Admitting: COLON & RECTAL SURGERY
Payer: MEDICARE

## 2024-10-03 ENCOUNTER — ANESTHESIA (OUTPATIENT)
Dept: SURGERY | Facility: HOSPITAL | Age: 69
End: 2024-10-03
Payer: MEDICARE

## 2024-10-03 DIAGNOSIS — R73.9 STEROID-INDUCED HYPERGLYCEMIA: ICD-10-CM

## 2024-10-03 DIAGNOSIS — C20 RECTAL CANCER: Primary | ICD-10-CM

## 2024-10-03 DIAGNOSIS — T38.0X5A STEROID-INDUCED HYPERGLYCEMIA: ICD-10-CM

## 2024-10-03 DIAGNOSIS — E11.65 UNCONTROLLED TYPE 2 DIABETES MELLITUS WITH HYPERGLYCEMIA: ICD-10-CM

## 2024-10-03 LAB
ABO + RH BLD: NORMAL
ALBUMIN SERPL BCP-MCNC: 2.6 G/DL (ref 3.5–5.2)
ALP SERPL-CCNC: 76 U/L (ref 55–135)
ALT SERPL W/O P-5'-P-CCNC: 14 U/L (ref 10–44)
ANION GAP SERPL CALC-SCNC: 9 MMOL/L (ref 8–16)
AST SERPL-CCNC: 20 U/L (ref 10–40)
BASOPHILS # BLD AUTO: 0.02 K/UL (ref 0–0.2)
BASOPHILS NFR BLD: 0.2 % (ref 0–1.9)
BILIRUB SERPL-MCNC: 1.9 MG/DL (ref 0.1–1)
BLD GP AB SCN CELLS X3 SERPL QL: NORMAL
BLD PROD TYP BPU: NORMAL
BLD PROD TYP BPU: NORMAL
BLOOD UNIT EXPIRATION DATE: NORMAL
BLOOD UNIT EXPIRATION DATE: NORMAL
BLOOD UNIT TYPE CODE: 7300
BLOOD UNIT TYPE CODE: 7300
BLOOD UNIT TYPE: NORMAL
BLOOD UNIT TYPE: NORMAL
BUN SERPL-MCNC: 15 MG/DL (ref 8–23)
CALCIUM SERPL-MCNC: 6.8 MG/DL (ref 8.7–10.5)
CHLORIDE SERPL-SCNC: 111 MMOL/L (ref 95–110)
CO2 SERPL-SCNC: 21 MMOL/L (ref 23–29)
CODING SYSTEM: NORMAL
CODING SYSTEM: NORMAL
CREAT SERPL-MCNC: 0.7 MG/DL (ref 0.5–1.4)
CROSSMATCH INTERPRETATION: NORMAL
CROSSMATCH INTERPRETATION: NORMAL
DIFFERENTIAL METHOD BLD: ABNORMAL
DISPENSE STATUS: NORMAL
DISPENSE STATUS: NORMAL
EOSINOPHIL # BLD AUTO: 0 K/UL (ref 0–0.5)
EOSINOPHIL NFR BLD: 0.1 % (ref 0–8)
ERYTHROCYTE [DISTWIDTH] IN BLOOD BY AUTOMATED COUNT: 13.3 % (ref 11.5–14.5)
EST. GFR  (NO RACE VARIABLE): >60 ML/MIN/1.73 M^2
GLUCOSE SERPL-MCNC: 101 MG/DL (ref 70–110)
GLUCOSE SERPL-MCNC: 139 MG/DL (ref 70–110)
GLUCOSE SERPL-MCNC: 151 MG/DL (ref 70–110)
GLUCOSE SERPL-MCNC: 169 MG/DL (ref 70–110)
GLUCOSE SERPL-MCNC: 198 MG/DL (ref 70–110)
GLUCOSE SERPL-MCNC: 206 MG/DL (ref 70–110)
HCO3 UR-SCNC: 17.3 MMOL/L (ref 24–28)
HCO3 UR-SCNC: 18.3 MMOL/L (ref 24–28)
HCO3 UR-SCNC: 19.8 MMOL/L (ref 24–28)
HCO3 UR-SCNC: 21.1 MMOL/L (ref 24–28)
HCO3 UR-SCNC: 21.5 MMOL/L (ref 24–28)
HCT VFR BLD AUTO: 29.9 % (ref 40–54)
HCT VFR BLD CALC: 26 %PCV (ref 36–54)
HCT VFR BLD CALC: 28 %PCV (ref 36–54)
HCT VFR BLD CALC: 29 %PCV (ref 36–54)
HCT VFR BLD CALC: 32 %PCV (ref 36–54)
HCT VFR BLD CALC: 35 %PCV (ref 36–54)
HGB BLD-MCNC: 10.4 G/DL (ref 14–18)
IMM GRANULOCYTES # BLD AUTO: 0.03 K/UL (ref 0–0.04)
IMM GRANULOCYTES NFR BLD AUTO: 0.3 % (ref 0–0.5)
LYMPHOCYTES # BLD AUTO: 0.6 K/UL (ref 1–4.8)
LYMPHOCYTES NFR BLD: 5.4 % (ref 18–48)
MAGNESIUM SERPL-MCNC: 1.6 MG/DL (ref 1.6–2.6)
MCH RBC QN AUTO: 31.3 PG (ref 27–31)
MCHC RBC AUTO-ENTMCNC: 34.8 G/DL (ref 32–36)
MCV RBC AUTO: 90 FL (ref 82–98)
MONOCYTES # BLD AUTO: 1.1 K/UL (ref 0.3–1)
MONOCYTES NFR BLD: 9.8 % (ref 4–15)
NEUTROPHILS # BLD AUTO: 9.1 K/UL (ref 1.8–7.7)
NEUTROPHILS NFR BLD: 84.2 % (ref 38–73)
NRBC BLD-RTO: 0 /100 WBC
NUM UNITS TRANS PACKED RBC: NORMAL
NUM UNITS TRANS PACKED RBC: NORMAL
PCO2 BLDA: 36.2 MMHG (ref 35–45)
PCO2 BLDA: 37 MMHG (ref 35–45)
PCO2 BLDA: 39.2 MMHG (ref 35–45)
PCO2 BLDA: 39.6 MMHG (ref 35–45)
PCO2 BLDA: 42 MMHG (ref 35–45)
PH SMN: 7.25 [PH] (ref 7.35–7.45)
PH SMN: 7.28 [PH] (ref 7.35–7.45)
PH SMN: 7.3 [PH] (ref 7.35–7.45)
PH SMN: 7.34 [PH] (ref 7.35–7.45)
PH SMN: 7.37 [PH] (ref 7.35–7.45)
PHOSPHATE SERPL-MCNC: 3.9 MG/DL (ref 2.7–4.5)
PLATELET # BLD AUTO: 171 K/UL (ref 150–450)
PMV BLD AUTO: 10 FL (ref 9.2–12.9)
PO2 BLDA: 154 MMHG (ref 80–100)
PO2 BLDA: 174 MMHG (ref 80–100)
PO2 BLDA: 182 MMHG (ref 80–100)
PO2 BLDA: 193 MMHG (ref 80–100)
PO2 BLDA: 199 MMHG (ref 80–100)
POC BE: -10 MMOL/L
POC BE: -4 MMOL/L
POC BE: -4 MMOL/L
POC BE: -7 MMOL/L
POC BE: -8 MMOL/L
POC IONIZED CALCIUM: 0.98 MMOL/L (ref 1.06–1.42)
POC IONIZED CALCIUM: 1 MMOL/L (ref 1.06–1.42)
POC IONIZED CALCIUM: 1 MMOL/L (ref 1.06–1.42)
POC IONIZED CALCIUM: 1.09 MMOL/L (ref 1.06–1.42)
POC IONIZED CALCIUM: 1.09 MMOL/L (ref 1.06–1.42)
POC SATURATED O2: 100 % (ref 95–100)
POC SATURATED O2: 99 % (ref 95–100)
POC SATURATED O2: 99 % (ref 95–100)
POC TCO2: 18 MMOL/L (ref 23–27)
POC TCO2: 19 MMOL/L (ref 23–27)
POC TCO2: 21 MMOL/L (ref 23–27)
POC TCO2: 22 MMOL/L (ref 23–27)
POC TCO2: 23 MMOL/L (ref 23–27)
POCT GLUCOSE: 202 MG/DL (ref 70–110)
POCT GLUCOSE: 74 MG/DL (ref 70–110)
POTASSIUM BLD-SCNC: 3.6 MMOL/L (ref 3.5–5.1)
POTASSIUM BLD-SCNC: 3.6 MMOL/L (ref 3.5–5.1)
POTASSIUM BLD-SCNC: 3.8 MMOL/L (ref 3.5–5.1)
POTASSIUM BLD-SCNC: 4.2 MMOL/L (ref 3.5–5.1)
POTASSIUM BLD-SCNC: 4.3 MMOL/L (ref 3.5–5.1)
POTASSIUM SERPL-SCNC: 4.6 MMOL/L (ref 3.5–5.1)
PROT SERPL-MCNC: 4.1 G/DL (ref 6–8.4)
RBC # BLD AUTO: 3.32 M/UL (ref 4.6–6.2)
SAMPLE: ABNORMAL
SODIUM BLD-SCNC: 138 MMOL/L (ref 136–145)
SODIUM BLD-SCNC: 140 MMOL/L (ref 136–145)
SODIUM BLD-SCNC: 141 MMOL/L (ref 136–145)
SODIUM SERPL-SCNC: 141 MMOL/L (ref 136–145)
SPECIMEN OUTDATE: NORMAL
WBC # BLD AUTO: 10.77 K/UL (ref 3.9–12.7)

## 2024-10-03 PROCEDURE — 86920 COMPATIBILITY TEST SPIN: CPT | Performed by: STUDENT IN AN ORGANIZED HEALTH CARE EDUCATION/TRAINING PROGRAM

## 2024-10-03 PROCEDURE — 88305 TISSUE EXAM BY PATHOLOGIST: CPT | Mod: 59 | Performed by: STUDENT IN AN ORGANIZED HEALTH CARE EDUCATION/TRAINING PROGRAM

## 2024-10-03 PROCEDURE — 0DTP0ZZ RESECTION OF RECTUM, OPEN APPROACH: ICD-10-PCS | Performed by: COLON & RECTAL SURGERY

## 2024-10-03 PROCEDURE — 88332 PATH CONSLTJ SURG EA ADD BLK: CPT | Mod: 26,,, | Performed by: STUDENT IN AN ORGANIZED HEALTH CARE EDUCATION/TRAINING PROGRAM

## 2024-10-03 PROCEDURE — 63600175 PHARM REV CODE 636 W HCPCS: Performed by: COLON & RECTAL SURGERY

## 2024-10-03 PROCEDURE — 86901 BLOOD TYPING SEROLOGIC RH(D): CPT | Performed by: NURSE PRACTITIONER

## 2024-10-03 PROCEDURE — 63600175 PHARM REV CODE 636 W HCPCS: Performed by: NURSE PRACTITIONER

## 2024-10-03 PROCEDURE — 27201423 OPTIME MED/SURG SUP & DEVICES STERILE SUPPLY: Performed by: COLON & RECTAL SURGERY

## 2024-10-03 PROCEDURE — 0WQF0ZZ REPAIR ABDOMINAL WALL, OPEN APPROACH: ICD-10-PCS | Performed by: COLON & RECTAL SURGERY

## 2024-10-03 PROCEDURE — 88331 PATH CONSLTJ SURG 1 BLK 1SPC: CPT | Mod: 26,,, | Performed by: STUDENT IN AN ORGANIZED HEALTH CARE EDUCATION/TRAINING PROGRAM

## 2024-10-03 PROCEDURE — 63600175 PHARM REV CODE 636 W HCPCS: Mod: JZ,JG | Performed by: STUDENT IN AN ORGANIZED HEALTH CARE EDUCATION/TRAINING PROGRAM

## 2024-10-03 PROCEDURE — 99900035 HC TECH TIME PER 15 MIN (STAT)

## 2024-10-03 PROCEDURE — 71000039 HC RECOVERY, EACH ADD'L HOUR: Performed by: COLON & RECTAL SURGERY

## 2024-10-03 PROCEDURE — 88307 TISSUE EXAM BY PATHOLOGIST: CPT | Performed by: STUDENT IN AN ORGANIZED HEALTH CARE EDUCATION/TRAINING PROGRAM

## 2024-10-03 PROCEDURE — 88302 TISSUE EXAM BY PATHOLOGIST: CPT | Mod: 26,,, | Performed by: STUDENT IN AN ORGANIZED HEALTH CARE EDUCATION/TRAINING PROGRAM

## 2024-10-03 PROCEDURE — 88309 TISSUE EXAM BY PATHOLOGIST: CPT | Mod: 26,,, | Performed by: STUDENT IN AN ORGANIZED HEALTH CARE EDUCATION/TRAINING PROGRAM

## 2024-10-03 PROCEDURE — 0DXU0ZW TRANSFER OMENTUM TO ABDOMINAL REGION, OPEN APPROACH: ICD-10-PCS | Performed by: COLON & RECTAL SURGERY

## 2024-10-03 PROCEDURE — 25000003 PHARM REV CODE 250: Performed by: NURSE PRACTITIONER

## 2024-10-03 PROCEDURE — 0DTJ0ZZ RESECTION OF APPENDIX, OPEN APPROACH: ICD-10-PCS | Performed by: COLON & RECTAL SURGERY

## 2024-10-03 PROCEDURE — C1758 CATHETER, URETERAL: HCPCS | Performed by: COLON & RECTAL SURGERY

## 2024-10-03 PROCEDURE — 84100 ASSAY OF PHOSPHORUS: CPT | Performed by: STUDENT IN AN ORGANIZED HEALTH CARE EDUCATION/TRAINING PROGRAM

## 2024-10-03 PROCEDURE — P9016 RBC LEUKOCYTES REDUCED: HCPCS | Performed by: STUDENT IN AN ORGANIZED HEALTH CARE EDUCATION/TRAINING PROGRAM

## 2024-10-03 PROCEDURE — 94761 N-INVAS EAR/PLS OXIMETRY MLT: CPT

## 2024-10-03 PROCEDURE — 63600175 PHARM REV CODE 636 W HCPCS: Performed by: STUDENT IN AN ORGANIZED HEALTH CARE EDUCATION/TRAINING PROGRAM

## 2024-10-03 PROCEDURE — 88332 PATH CONSLTJ SURG EA ADD BLK: CPT | Performed by: STUDENT IN AN ORGANIZED HEALTH CARE EDUCATION/TRAINING PROGRAM

## 2024-10-03 PROCEDURE — 27201037 HC PRESSURE MONITORING SET UP

## 2024-10-03 PROCEDURE — 82962 GLUCOSE BLOOD TEST: CPT | Performed by: COLON & RECTAL SURGERY

## 2024-10-03 PROCEDURE — 52005 CYSTO W/URTRL CATHJ: CPT | Mod: ,,, | Performed by: UROLOGY

## 2024-10-03 PROCEDURE — 71000033 HC RECOVERY, INTIAL HOUR: Performed by: COLON & RECTAL SURGERY

## 2024-10-03 PROCEDURE — 25000003 PHARM REV CODE 250: Performed by: STUDENT IN AN ORGANIZED HEALTH CARE EDUCATION/TRAINING PROGRAM

## 2024-10-03 PROCEDURE — C9290 INJ, BUPIVACAINE LIPOSOME: HCPCS | Performed by: COLON & RECTAL SURGERY

## 2024-10-03 PROCEDURE — 71000015 HC POSTOP RECOV 1ST HR: Performed by: COLON & RECTAL SURGERY

## 2024-10-03 PROCEDURE — 36000709 HC OR TIME LEV III EA ADD 15 MIN: Performed by: COLON & RECTAL SURGERY

## 2024-10-03 PROCEDURE — 88309 TISSUE EXAM BY PATHOLOGIST: CPT | Performed by: STUDENT IN AN ORGANIZED HEALTH CARE EDUCATION/TRAINING PROGRAM

## 2024-10-03 PROCEDURE — 88304 TISSUE EXAM BY PATHOLOGIST: CPT | Performed by: STUDENT IN AN ORGANIZED HEALTH CARE EDUCATION/TRAINING PROGRAM

## 2024-10-03 PROCEDURE — 20600001 HC STEP DOWN PRIVATE ROOM

## 2024-10-03 PROCEDURE — C1769 GUIDE WIRE: HCPCS | Performed by: COLON & RECTAL SURGERY

## 2024-10-03 PROCEDURE — 86900 BLOOD TYPING SEROLOGIC ABO: CPT | Performed by: NURSE PRACTITIONER

## 2024-10-03 PROCEDURE — 37000008 HC ANESTHESIA 1ST 15 MINUTES: Performed by: COLON & RECTAL SURGERY

## 2024-10-03 PROCEDURE — 4A1BXSH MONITORING OF GASTROINTESTINAL VASCULAR PERFUSION USING INDOCYANINE GREEN DYE, EXTERNAL APPROACH: ICD-10-PCS | Performed by: COLON & RECTAL SURGERY

## 2024-10-03 PROCEDURE — 71000016 HC POSTOP RECOV ADDL HR: Performed by: COLON & RECTAL SURGERY

## 2024-10-03 PROCEDURE — 80053 COMPREHEN METABOLIC PANEL: CPT | Performed by: STUDENT IN AN ORGANIZED HEALTH CARE EDUCATION/TRAINING PROGRAM

## 2024-10-03 PROCEDURE — 27100025 HC TUBING, SET FLUID WARMER: Performed by: ANESTHESIOLOGY

## 2024-10-03 PROCEDURE — 63600175 PHARM REV CODE 636 W HCPCS: Performed by: NURSE ANESTHETIST, CERTIFIED REGISTERED

## 2024-10-03 PROCEDURE — 94760 N-INVAS EAR/PLS OXIMETRY 1: CPT

## 2024-10-03 PROCEDURE — 88304 TISSUE EXAM BY PATHOLOGIST: CPT | Mod: 26,,, | Performed by: STUDENT IN AN ORGANIZED HEALTH CARE EDUCATION/TRAINING PROGRAM

## 2024-10-03 PROCEDURE — P9045 ALBUMIN (HUMAN), 5%, 250 ML: HCPCS | Mod: JZ,JG | Performed by: NURSE ANESTHETIST, CERTIFIED REGISTERED

## 2024-10-03 PROCEDURE — 88331 PATH CONSLTJ SURG 1 BLK 1SPC: CPT | Mod: 59 | Performed by: STUDENT IN AN ORGANIZED HEALTH CARE EDUCATION/TRAINING PROGRAM

## 2024-10-03 PROCEDURE — 0T788DZ DILATION OF BILATERAL URETERS WITH INTRALUMINAL DEVICE, VIA NATURAL OR ARTIFICIAL OPENING ENDOSCOPIC: ICD-10-PCS | Performed by: UROLOGY

## 2024-10-03 PROCEDURE — 94799 UNLISTED PULMONARY SVC/PX: CPT

## 2024-10-03 PROCEDURE — 37000009 HC ANESTHESIA EA ADD 15 MINS: Performed by: COLON & RECTAL SURGERY

## 2024-10-03 PROCEDURE — 0D1K0Z4 BYPASS ASCENDING COLON TO CUTANEOUS, OPEN APPROACH: ICD-10-PCS | Performed by: COLON & RECTAL SURGERY

## 2024-10-03 PROCEDURE — 27200677 HC TRANSDUCER MONITOR KIT SINGLE: Performed by: ANESTHESIOLOGY

## 2024-10-03 PROCEDURE — 88302 TISSUE EXAM BY PATHOLOGIST: CPT | Performed by: STUDENT IN AN ORGANIZED HEALTH CARE EDUCATION/TRAINING PROGRAM

## 2024-10-03 PROCEDURE — 85025 COMPLETE CBC W/AUTO DIFF WBC: CPT | Performed by: STUDENT IN AN ORGANIZED HEALTH CARE EDUCATION/TRAINING PROGRAM

## 2024-10-03 PROCEDURE — 25000003 PHARM REV CODE 250: Performed by: NURSE ANESTHETIST, CERTIFIED REGISTERED

## 2024-10-03 PROCEDURE — 27800505 HC CATH, RADIAL ARTERY KIT: Performed by: ANESTHESIOLOGY

## 2024-10-03 PROCEDURE — 0DTG0ZZ RESECTION OF LEFT LARGE INTESTINE, OPEN APPROACH: ICD-10-PCS | Performed by: COLON & RECTAL SURGERY

## 2024-10-03 PROCEDURE — 83735 ASSAY OF MAGNESIUM: CPT | Performed by: STUDENT IN AN ORGANIZED HEALTH CARE EDUCATION/TRAINING PROGRAM

## 2024-10-03 PROCEDURE — 0DN80ZZ RELEASE SMALL INTESTINE, OPEN APPROACH: ICD-10-PCS | Performed by: COLON & RECTAL SURGERY

## 2024-10-03 PROCEDURE — 36000708 HC OR TIME LEV III 1ST 15 MIN: Performed by: COLON & RECTAL SURGERY

## 2024-10-03 PROCEDURE — 88305 TISSUE EXAM BY PATHOLOGIST: CPT | Mod: 26,,, | Performed by: STUDENT IN AN ORGANIZED HEALTH CARE EDUCATION/TRAINING PROGRAM

## 2024-10-03 RX ORDER — SODIUM CHLORIDE 9 MG/ML
INJECTION, SOLUTION INTRAVENOUS CONTINUOUS
Status: DISCONTINUED | OUTPATIENT
Start: 2024-10-03 | End: 2024-10-05

## 2024-10-03 RX ORDER — ACETAMINOPHEN 650 MG/20.3ML
975 LIQUID ORAL
Status: COMPLETED | OUTPATIENT
Start: 2024-10-03 | End: 2024-10-03

## 2024-10-03 RX ORDER — INSULIN ASPART 100 [IU]/ML
1-10 INJECTION, SOLUTION INTRAVENOUS; SUBCUTANEOUS
Status: DISCONTINUED | OUTPATIENT
Start: 2024-10-03 | End: 2024-10-04

## 2024-10-03 RX ORDER — SODIUM CHLORIDE 0.9 % (FLUSH) 0.9 %
10 SYRINGE (ML) INJECTION
Status: DISCONTINUED | OUTPATIENT
Start: 2024-10-03 | End: 2024-10-03 | Stop reason: HOSPADM

## 2024-10-03 RX ORDER — ROCURONIUM BROMIDE 10 MG/ML
INJECTION, SOLUTION INTRAVENOUS
Status: DISCONTINUED | OUTPATIENT
Start: 2024-10-03 | End: 2024-10-03

## 2024-10-03 RX ORDER — HEPARIN SODIUM 5000 [USP'U]/ML
5000 INJECTION, SOLUTION INTRAVENOUS; SUBCUTANEOUS ONCE
Status: COMPLETED | OUTPATIENT
Start: 2024-10-03 | End: 2024-10-03

## 2024-10-03 RX ORDER — ALBUMIN HUMAN 50 G/1000ML
SOLUTION INTRAVENOUS
Status: DISCONTINUED | OUTPATIENT
Start: 2024-10-03 | End: 2024-10-03

## 2024-10-03 RX ORDER — SODIUM CHLORIDE 9 MG/ML
INJECTION, SOLUTION INTRAVENOUS
Status: DISCONTINUED | OUTPATIENT
Start: 2024-10-03 | End: 2024-10-03 | Stop reason: HOSPADM

## 2024-10-03 RX ORDER — TRAMADOL HYDROCHLORIDE 50 MG/1
50 TABLET ORAL EVERY 6 HOURS PRN
Status: DISCONTINUED | OUTPATIENT
Start: 2024-10-03 | End: 2024-10-08 | Stop reason: HOSPADM

## 2024-10-03 RX ORDER — MIDAZOLAM HYDROCHLORIDE 1 MG/ML
INJECTION INTRAMUSCULAR; INTRAVENOUS
Status: DISCONTINUED | OUTPATIENT
Start: 2024-10-03 | End: 2024-10-03

## 2024-10-03 RX ORDER — ONDANSETRON HYDROCHLORIDE 2 MG/ML
4 INJECTION, SOLUTION INTRAVENOUS EVERY 12 HOURS PRN
Status: DISCONTINUED | OUTPATIENT
Start: 2024-10-03 | End: 2024-10-08 | Stop reason: HOSPADM

## 2024-10-03 RX ORDER — ONDANSETRON 2 MG/ML
INJECTION INTRAMUSCULAR; INTRAVENOUS
Status: DISCONTINUED | OUTPATIENT
Start: 2024-10-03 | End: 2024-10-03

## 2024-10-03 RX ORDER — IBUPROFEN 200 MG
24 TABLET ORAL
Status: DISCONTINUED | OUTPATIENT
Start: 2024-10-03 | End: 2024-10-04

## 2024-10-03 RX ORDER — VASOPRESSIN 20 [USP'U]/ML
INJECTION, SOLUTION INTRAMUSCULAR; SUBCUTANEOUS
Status: DISCONTINUED | OUTPATIENT
Start: 2024-10-03 | End: 2024-10-03

## 2024-10-03 RX ORDER — INDOMETHACIN 25 MG/1
CAPSULE ORAL
Status: DISCONTINUED | OUTPATIENT
Start: 2024-10-03 | End: 2024-10-03

## 2024-10-03 RX ORDER — ALVIMOPAN 12 MG/1
12 CAPSULE ORAL 2 TIMES DAILY
Status: DISCONTINUED | OUTPATIENT
Start: 2024-10-03 | End: 2024-10-08 | Stop reason: HOSPADM

## 2024-10-03 RX ORDER — ACETAMINOPHEN 500 MG
1000 TABLET ORAL EVERY 8 HOURS
Status: DISCONTINUED | OUTPATIENT
Start: 2024-10-04 | End: 2024-10-08 | Stop reason: HOSPADM

## 2024-10-03 RX ORDER — OXYCODONE HYDROCHLORIDE 5 MG/1
5 TABLET ORAL
Status: DISCONTINUED | OUTPATIENT
Start: 2024-10-03 | End: 2024-10-03 | Stop reason: HOSPADM

## 2024-10-03 RX ORDER — GABAPENTIN 300 MG/1
300 CAPSULE ORAL
Status: COMPLETED | OUTPATIENT
Start: 2024-10-03 | End: 2024-10-03

## 2024-10-03 RX ORDER — GLUCAGON 1 MG
1 KIT INJECTION
Status: DISCONTINUED | OUTPATIENT
Start: 2024-10-03 | End: 2024-10-03 | Stop reason: HOSPADM

## 2024-10-03 RX ORDER — ONDANSETRON HYDROCHLORIDE 2 MG/ML
4 INJECTION, SOLUTION INTRAVENOUS DAILY PRN
Status: DISCONTINUED | OUTPATIENT
Start: 2024-10-03 | End: 2024-10-03 | Stop reason: HOSPADM

## 2024-10-03 RX ORDER — ONDANSETRON HYDROCHLORIDE 2 MG/ML
INJECTION, SOLUTION INTRAVENOUS
Status: DISCONTINUED | OUTPATIENT
Start: 2024-10-03 | End: 2024-10-03

## 2024-10-03 RX ORDER — METRONIDAZOLE 500 MG/100ML
500 INJECTION, SOLUTION INTRAVENOUS
Status: COMPLETED | OUTPATIENT
Start: 2024-10-03 | End: 2024-10-03

## 2024-10-03 RX ORDER — BUPIVACAINE HYDROCHLORIDE 2.5 MG/ML
INJECTION, SOLUTION EPIDURAL; INFILTRATION; INTRACAUDAL
Status: DISCONTINUED | OUTPATIENT
Start: 2024-10-03 | End: 2024-10-03 | Stop reason: HOSPADM

## 2024-10-03 RX ORDER — FENTANYL CITRATE 50 UG/ML
INJECTION, SOLUTION INTRAMUSCULAR; INTRAVENOUS
Status: DISCONTINUED | OUTPATIENT
Start: 2024-10-03 | End: 2024-10-03

## 2024-10-03 RX ORDER — LIDOCAINE HYDROCHLORIDE 20 MG/ML
INJECTION INTRAVENOUS
Status: DISCONTINUED | OUTPATIENT
Start: 2024-10-03 | End: 2024-10-03

## 2024-10-03 RX ORDER — PANTOPRAZOLE SODIUM 40 MG/1
40 TABLET, DELAYED RELEASE ORAL
Status: DISCONTINUED | OUTPATIENT
Start: 2024-10-04 | End: 2024-10-08 | Stop reason: HOSPADM

## 2024-10-03 RX ORDER — NALOXONE HCL 0.4 MG/ML
0.02 VIAL (ML) INJECTION
Status: DISCONTINUED | OUTPATIENT
Start: 2024-10-03 | End: 2024-10-08 | Stop reason: HOSPADM

## 2024-10-03 RX ORDER — KETAMINE HCL IN 0.9 % NACL 50 MG/5 ML
SYRINGE (ML) INTRAVENOUS
Status: DISCONTINUED | OUTPATIENT
Start: 2024-10-03 | End: 2024-10-03

## 2024-10-03 RX ORDER — HYDROMORPHONE HYDROCHLORIDE 1 MG/ML
0.2 INJECTION, SOLUTION INTRAMUSCULAR; INTRAVENOUS; SUBCUTANEOUS EVERY 5 MIN PRN
Status: DISCONTINUED | OUTPATIENT
Start: 2024-10-03 | End: 2024-10-03 | Stop reason: HOSPADM

## 2024-10-03 RX ORDER — PHENYLEPHRINE HYDROCHLORIDE 10 MG/ML
INJECTION INTRAVENOUS
Status: DISCONTINUED | OUTPATIENT
Start: 2024-10-03 | End: 2024-10-03

## 2024-10-03 RX ORDER — INSULIN GLARGINE 100 [IU]/ML
12 INJECTION, SOLUTION SUBCUTANEOUS NIGHTLY
Status: DISCONTINUED | OUTPATIENT
Start: 2024-10-03 | End: 2024-10-04

## 2024-10-03 RX ORDER — ACETAMINOPHEN 10 MG/ML
1000 INJECTION, SOLUTION INTRAVENOUS EVERY 8 HOURS
Status: COMPLETED | OUTPATIENT
Start: 2024-10-03 | End: 2024-10-04

## 2024-10-03 RX ORDER — HYDROMORPHONE HCL IN 0.9% NACL 6 MG/30 ML
PATIENT CONTROLLED ANALGESIA SYRINGE INTRAVENOUS CONTINUOUS
Status: DISCONTINUED | OUTPATIENT
Start: 2024-10-03 | End: 2024-10-04

## 2024-10-03 RX ORDER — MUPIROCIN 20 MG/G
1 OINTMENT TOPICAL
Status: COMPLETED | OUTPATIENT
Start: 2024-10-03 | End: 2024-10-03

## 2024-10-03 RX ORDER — DEXAMETHASONE SODIUM PHOSPHATE 4 MG/ML
INJECTION, SOLUTION INTRA-ARTICULAR; INTRALESIONAL; INTRAMUSCULAR; INTRAVENOUS; SOFT TISSUE
Status: DISCONTINUED | OUTPATIENT
Start: 2024-10-03 | End: 2024-10-03

## 2024-10-03 RX ORDER — INDOCYANINE GREEN AND WATER 25 MG
KIT INJECTION
Status: DISCONTINUED | OUTPATIENT
Start: 2024-10-03 | End: 2024-10-03

## 2024-10-03 RX ORDER — ALBUTEROL SULFATE 90 UG/1
2 INHALANT RESPIRATORY (INHALATION) EVERY 6 HOURS PRN
Status: DISCONTINUED | OUTPATIENT
Start: 2024-10-03 | End: 2024-10-08 | Stop reason: HOSPADM

## 2024-10-03 RX ORDER — IBUPROFEN 200 MG
16 TABLET ORAL
Status: DISCONTINUED | OUTPATIENT
Start: 2024-10-03 | End: 2024-10-04

## 2024-10-03 RX ORDER — LIDOCAINE HYDROCHLORIDE 10 MG/ML
1 INJECTION, SOLUTION EPIDURAL; INFILTRATION; INTRACAUDAL; PERINEURAL
Status: COMPLETED | OUTPATIENT
Start: 2024-10-03 | End: 2024-10-03

## 2024-10-03 RX ORDER — GLUCAGON 1 MG
1 KIT INJECTION
Status: DISCONTINUED | OUTPATIENT
Start: 2024-10-03 | End: 2024-10-04

## 2024-10-03 RX ORDER — IBUPROFEN 400 MG/1
800 TABLET ORAL EVERY 8 HOURS
Status: DISCONTINUED | OUTPATIENT
Start: 2024-10-04 | End: 2024-10-04

## 2024-10-03 RX ORDER — TRIPROLIDINE/PSEUDOEPHEDRINE 2.5MG-60MG
600 TABLET ORAL
Status: COMPLETED | OUTPATIENT
Start: 2024-10-03 | End: 2024-10-03

## 2024-10-03 RX ORDER — PROPOFOL 10 MG/ML
VIAL (ML) INTRAVENOUS
Status: DISCONTINUED | OUTPATIENT
Start: 2024-10-03 | End: 2024-10-03

## 2024-10-03 RX ADMIN — PHENYLEPHRINE HYDROCHLORIDE 200 MCG: 10 INJECTION INTRAVENOUS at 01:10

## 2024-10-03 RX ADMIN — INSULIN GLARGINE 12 UNITS: 100 INJECTION, SOLUTION SUBCUTANEOUS at 09:10

## 2024-10-03 RX ADMIN — SODIUM CHLORIDE, SODIUM GLUCONATE, SODIUM ACETATE, POTASSIUM CHLORIDE, MAGNESIUM CHLORIDE, SODIUM PHOSPHATE, DIBASIC, AND POTASSIUM PHOSPHATE: .53; .5; .37; .037; .03; .012; .00082 INJECTION, SOLUTION INTRAVENOUS at 11:10

## 2024-10-03 RX ADMIN — PHENYLEPHRINE HYDROCHLORIDE 100 MCG: 10 INJECTION INTRAVENOUS at 02:10

## 2024-10-03 RX ADMIN — ROCURONIUM BROMIDE 20 MG: 10 INJECTION, SOLUTION INTRAVENOUS at 12:10

## 2024-10-03 RX ADMIN — ACETAMINOPHEN 1000 MG: 10 INJECTION, SOLUTION INTRAVENOUS at 09:10

## 2024-10-03 RX ADMIN — ROCURONIUM BROMIDE 30 MG: 10 INJECTION, SOLUTION INTRAVENOUS at 08:10

## 2024-10-03 RX ADMIN — PHENYLEPHRINE HYDROCHLORIDE 100 MCG: 10 INJECTION INTRAVENOUS at 11:10

## 2024-10-03 RX ADMIN — PHENYLEPHRINE HYDROCHLORIDE 0.25 MCG/KG/MIN: 10 INJECTION INTRAVENOUS at 08:10

## 2024-10-03 RX ADMIN — PHENYLEPHRINE HYDROCHLORIDE 200 MCG: 10 INJECTION INTRAVENOUS at 11:10

## 2024-10-03 RX ADMIN — Medication 20 MG: at 08:10

## 2024-10-03 RX ADMIN — PHENYLEPHRINE HYDROCHLORIDE 200 MCG: 10 INJECTION INTRAVENOUS at 07:10

## 2024-10-03 RX ADMIN — Medication 10 MG: at 09:10

## 2024-10-03 RX ADMIN — SUGAMMADEX 200 MG: 100 INJECTION, SOLUTION INTRAVENOUS at 03:10

## 2024-10-03 RX ADMIN — FENTANYL CITRATE 100 MCG: 50 INJECTION, SOLUTION INTRAMUSCULAR; INTRAVENOUS at 07:10

## 2024-10-03 RX ADMIN — VASOPRESSIN 1 UNITS: 20 INJECTION INTRAVENOUS at 02:10

## 2024-10-03 RX ADMIN — MIDAZOLAM HYDROCHLORIDE 1 MG: 2 INJECTION, SOLUTION INTRAMUSCULAR; INTRAVENOUS at 07:10

## 2024-10-03 RX ADMIN — PHENYLEPHRINE HYDROCHLORIDE 300 MCG: 10 INJECTION INTRAVENOUS at 01:10

## 2024-10-03 RX ADMIN — SODIUM BICARBONATE 25 MEQ: 84 INJECTION, SOLUTION INTRAVENOUS at 12:10

## 2024-10-03 RX ADMIN — PROPOFOL 200 MG: 10 INJECTION, EMULSION INTRAVENOUS at 07:10

## 2024-10-03 RX ADMIN — ROCURONIUM BROMIDE 20 MG: 10 INJECTION, SOLUTION INTRAVENOUS at 11:10

## 2024-10-03 RX ADMIN — GABAPENTIN 300 MG: 300 CAPSULE ORAL at 06:10

## 2024-10-03 RX ADMIN — ONDANSETRON 4 MG: 2 INJECTION INTRAMUSCULAR; INTRAVENOUS at 03:10

## 2024-10-03 RX ADMIN — CALCIUM GLUCONATE 1 G: 98 INJECTION, SOLUTION INTRAVENOUS at 06:10

## 2024-10-03 RX ADMIN — ROCURONIUM BROMIDE 20 MG: 10 INJECTION, SOLUTION INTRAVENOUS at 09:10

## 2024-10-03 RX ADMIN — ROCURONIUM BROMIDE 50 MG: 10 INJECTION, SOLUTION INTRAVENOUS at 07:10

## 2024-10-03 RX ADMIN — DEXAMETHASONE SODIUM PHOSPHATE 4 MG: 4 INJECTION, SOLUTION INTRAMUSCULAR; INTRAVENOUS at 09:10

## 2024-10-03 RX ADMIN — ROCURONIUM BROMIDE 20 MG: 10 INJECTION, SOLUTION INTRAVENOUS at 08:10

## 2024-10-03 RX ADMIN — CALCIUM GLUCONATE 1 G: 98 INJECTION, SOLUTION INTRAVENOUS at 05:10

## 2024-10-03 RX ADMIN — INDOCYANINE GREEN AND WATER 7.5 MG: KIT at 02:10

## 2024-10-03 RX ADMIN — LIDOCAINE HYDROCHLORIDE 10 MG: 10 INJECTION, SOLUTION EPIDURAL; INFILTRATION; INTRACAUDAL; PERINEURAL at 06:10

## 2024-10-03 RX ADMIN — ROCURONIUM BROMIDE 30 MG: 10 INJECTION, SOLUTION INTRAVENOUS at 09:10

## 2024-10-03 RX ADMIN — ALVIMOPAN 12 MG: 12 CAPSULE ORAL at 09:10

## 2024-10-03 RX ADMIN — Medication 10 MG: at 11:10

## 2024-10-03 RX ADMIN — Medication 10 MG: at 10:10

## 2024-10-03 RX ADMIN — ONDANSETRON 0.5 G: 2 INJECTION INTRAMUSCULAR; INTRAVENOUS at 03:10

## 2024-10-03 RX ADMIN — HEPARIN SODIUM 5000 UNITS: 5000 INJECTION INTRAVENOUS; SUBCUTANEOUS at 06:10

## 2024-10-03 RX ADMIN — PHENYLEPHRINE HYDROCHLORIDE 200 MCG: 10 INJECTION INTRAVENOUS at 12:10

## 2024-10-03 RX ADMIN — SODIUM CHLORIDE: 9 INJECTION, SOLUTION INTRAVENOUS at 04:10

## 2024-10-03 RX ADMIN — ALBUMIN (HUMAN) 500 ML: 12.5 SOLUTION INTRAVENOUS at 11:10

## 2024-10-03 RX ADMIN — LIDOCAINE HYDROCHLORIDE 50 MG: 20 INJECTION INTRAVENOUS at 07:10

## 2024-10-03 RX ADMIN — SODIUM CHLORIDE: 0.9 INJECTION, SOLUTION INTRAVENOUS at 07:10

## 2024-10-03 RX ADMIN — ROCURONIUM BROMIDE 20 MG: 10 INJECTION, SOLUTION INTRAVENOUS at 10:10

## 2024-10-03 RX ADMIN — METRONIDAZOLE 500 MG: 500 INJECTION, SOLUTION INTRAVENOUS at 07:10

## 2024-10-03 RX ADMIN — SODIUM BICARBONATE 50 MEQ: 84 INJECTION, SOLUTION INTRAVENOUS at 03:10

## 2024-10-03 RX ADMIN — SODIUM CHLORIDE, SODIUM GLUCONATE, SODIUM ACETATE, POTASSIUM CHLORIDE, MAGNESIUM CHLORIDE, SODIUM PHOSPHATE, DIBASIC, AND POTASSIUM PHOSPHATE: .53; .5; .37; .037; .03; .012; .00082 INJECTION, SOLUTION INTRAVENOUS at 03:10

## 2024-10-03 RX ADMIN — ACETAMINOPHEN 976.6 MG: 650 SOLUTION ORAL at 06:10

## 2024-10-03 RX ADMIN — IBUPROFEN 600 MG: 100 SUSPENSION ORAL at 06:10

## 2024-10-03 RX ADMIN — IBUPROFEN 800 MG: 800 INJECTION INTRAVENOUS at 10:10

## 2024-10-03 RX ADMIN — CEFTRIAXONE SODIUM 2 G: 2 INJECTION, POWDER, FOR SOLUTION INTRAMUSCULAR; INTRAVENOUS at 07:10

## 2024-10-03 RX ADMIN — MUPIROCIN 1 G: 20 OINTMENT TOPICAL at 06:10

## 2024-10-03 RX ADMIN — SODIUM CHLORIDE, SODIUM GLUCONATE, SODIUM ACETATE, POTASSIUM CHLORIDE, MAGNESIUM CHLORIDE, SODIUM PHOSPHATE, DIBASIC, AND POTASSIUM PHOSPHATE: .53; .5; .37; .037; .03; .012; .00082 INJECTION, SOLUTION INTRAVENOUS at 07:10

## 2024-10-03 RX ADMIN — SODIUM CHLORIDE, SODIUM GLUCONATE, SODIUM ACETATE, POTASSIUM CHLORIDE, MAGNESIUM CHLORIDE, SODIUM PHOSPHATE, DIBASIC, AND POTASSIUM PHOSPHATE: .53; .5; .37; .037; .03; .012; .00082 INJECTION, SOLUTION INTRAVENOUS at 01:10

## 2024-10-03 RX ADMIN — ROCURONIUM BROMIDE 20 MG: 10 INJECTION, SOLUTION INTRAVENOUS at 01:10

## 2024-10-03 NOTE — H&P
[facility] - Surgery ([facility])  [facility]  Operative Note    Date: 10/03/2024    Pre-Op Diagnosis: Rectal cancer       Post-Op Diagnosis: Same     Procedure(s) Performed:   1.  Cystoscopy with bilateral ureteral catheter placement    Specimen(s): None    Staff Surgeon: Gopi Rubio MD    Assistant Surgeon: Luke Trevizo DO     Anesthesia: General endotracheal anesthesia    Indications: KIRSTY Mason is a 68 y.o. male with a history of rectal cancer.  Dr. Hong has requested intra-operative ureteral catheters to allow for early intra-operative identification and repair of any injuries.      Findings:  Bilateral ureteral catheters placed without complication  Attached to 16 Fr Velasquez catheter with attachment device    Estimated Blood Loss: min    Drains:   1.  bilateral 5 Fr ureteral catheters  2.  16 Fr velasquez catheter, 10 cc in the balloon    Procedure in Detail: Upon entering the room the patient was under general anesthesia.  The patient was then placed in the dorsal lithotomy position and prepped and draped in the usual sterile fashion. Preoperative antibiotics were administered per the primary surgeon preference.  Timeout was performed.      A 22 Fr cystoscope was inserted into the urethra and formal cystourethroscopy was performed. The urethra was normal.  The right and left ureteral orifices were in the normal anatomic position.  There were no mucosal abnormalities. A motion wire was used to cannulate the right UO and advanced to the level of the renal pelvis and a 5 Fr ureteral catheter was then inserted into the right ureteral orifice and advanced up to the level of the renal pelvis. The motion wire was then removed. The cystoscope was then removed leaving the ureteral catheter in place.     The cystoscope was then reinserted alongside the ureteral catheter and a motion wire was used to cannulate the left ureteral orifice and advanced to the level of the renal pelvis. A 5 Fr ureteral catheter was  advanced into the left ureteral orifice and advanced to the level of the renal pelvis. The cystoscope was then removed, leaving the ureteral catheter in place.    A 16 Fr velasquez catheter was inserted and the balloon was filled with 10mL of sterile water. The ureteral catheters were secured to the Velasquez catheter in the standard fashion with the attachment device. There were no complications with the procedure and the patient tolerated our procedure well.     The case was then turned over to the primary surgeon.     Luke Trevizo DO

## 2024-10-03 NOTE — ANESTHESIA PROCEDURE NOTES
Arterial    Diagnosis: Recurrent Upper Rectal Cancer    Patient location during procedure: done in OR  Timeout: 10/3/2024 7:33 AM  Procedure end time: 10/3/2024 7:44 AM    Staffing  Authorizing Provider: Dee Mo MD  Performing Provider: Velvet Randolph    Staffing  Performed by: Velvet Randolph  Authorized by: Dee Mo MD    Anesthesiologist was present at the time of the procedure.    Preanesthetic Checklist  Completed: patient identified, IV checked, site marked, risks and benefits discussed, surgical consent, monitors and equipment checked, pre-op evaluation, timeout performed and anesthesia consent givenArterial  Skin Prep: chlorhexidine gluconate  Local Infiltration: none  Orientation: left  Location: radial    Catheter Size: 20 G  Catheter placement by Ultrasound guidance. Heme positive aspiration all ports.   Vessel Caliber: small, patent, compressibility normal  Vascular Doppler:  not done  Needle advanced into vessel with real time Ultrasound guidance.Insertion Attempts: 2  Assessment  Dressing: secured with tape and tegaderm  Patient: Tolerated well

## 2024-10-03 NOTE — BRIEF OP NOTE
[facility] - Surgery ([facility])  Brief Operative Note    SUMMARY     Surgery Date: 10/3/2024     Surgeons and Role:  Panel 1:     * MEGHAN Hong MD - Primary     * Munira Camacho MD - Resident - Assisting     * Chema Ruelas MD - Fellow  Panel 2:     * Gopi Rubio MD - Primary     * Luke Trevizo DO - Resident - Assisting        Pre-op Diagnosis:  Rectal cancer [C20]    Post-op Diagnosis:  Post-Op Diagnosis Codes:     * Rectal cancer [C20]    Procedure(s) (LRB):  RESECTION, RECTUM, LOW ANTERIOR (N/A)  CYSTOSCOPY, WITH URETERAL STENT INSERTION (Bilateral)  APPENDECTOMY    Anesthesia: General    Implants:  * No implants in log *    Operative Findings: LAR, small bowel resection, end colostomy, R1 resection in pelvis, 2U PRBC given intraop    Estimated Blood Loss: 1000 mL    Estimated Blood Loss has been documented.         Specimens:   Specimen (24h ago, onward)       Start     Ordered    10/03/24 1506  Specimen to Pathology, Surgery General Surgery  Once        Comments: Pre-op Diagnosis: Rectal cancer [C20]Procedure(s):RESECTION, RECTUM, LOW ANTERIORCYSTOSCOPY, WITH URETERAL STENT INSERTIONAPPENDECTOMY Number of specimens: 4perm, 1 sent perm, 4frozen=8Name of specimens: 1. Abdominal scar- permanent2. Posterior margin- frozen3. Transverse colon, left colon, and rectum- permanent sent4.right posterior sacral margin - frozen5. posterior margin #2 - frozen6. appendix- permanent7. Hepatic flexure- permanent8 hernia sac-perm     References:    Click here for ordering Quick Tip   Question Answer Comment   Procedure Type: General Surgery    Specimen Class: Routine/Screening    Release to patient Immediate        10/03/24 1514                    [id number]

## 2024-10-03 NOTE — TRANSFER OF CARE
Anesthesia Transfer of Care Note    Patient: KIRSTY Mason    Procedure(s) Performed: Procedure(s) (LRB):  RESECTION, RECTUM, LOW ANTERIOR (N/A)  CYSTOSCOPY, WITH URETERAL STENT INSERTION (Bilateral)  APPENDECTOMY  LYSIS, ADHESIONS  LAPAROTOMY, EXPLORATORY  MOBILIZATION, SPLENIC FLEXURE  MOBILIZATION, HEPATIC FLEXURE    Patient location: PACU    Anesthesia Type: general    Transport from OR: Transported from OR on 6-10 L/min O2 by face mask with adequate spontaneous ventilation    Post pain: adequate analgesia    Post assessment: no apparent anesthetic complications    Post vital signs: stable    Level of consciousness: awake    Nausea/Vomiting: no nausea/vomiting    Complications: none    Transfer of care protocol was followed      Last vitals: Visit Vitals  /68 (BP Location: Right arm, Patient Position: Lying)   Pulse 70   Temp 36.4 °C (97.5 °F) (Temporal)   Resp 16   Ht 6' (1.829 m)   Wt 109.5 kg (241 lb 6.5 oz)   SpO2 100%   BMI 32.74 kg/m²
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
## 2024-10-03 NOTE — NURSING TRANSFER
Nursing Transfer Note      10/3/2024   5:45 PM    Transfer To: Room 1043    Transfer via bed    Transfer with cardiac monitoring    Transported by hospital transport    Telemetry: Rate 74  Order for Tele Monitor? Yes    Additional Lines: Bowers Catheter    4eyes on Skin: yes    Medicines sent: IVF; calcium gluconate; dilaudid PCA    Any special needs or follow-up needed: None    Patient belongings transferred with patient: No    Chart send with patient: Yes    Notified: spouse    Patient reassessed at: 10/3/2024  17:44 (date, time)

## 2024-10-03 NOTE — OP NOTE
Date of procedure:   October 3, 2024    Indications for procedure:  67 yo male with history of mid to upper rectal cancer status post prior low anterior resection with anastomosis at 8 cm from anal verge who has developed local anastomotic recurrence as well as a synchronous descending colon cancer,.  There is no evidence of systemic spread.  The patient has undergone boost pelvic radiotherapy with evidence of down-staging of the pelvic recurrence    Preoperative diagnosis:   recurrent rectal cancer, descending colon cancer    Postoperative diagnosis:  Same,  frozen abdomen secondary to extensive small bowel adhesions,adherent small bowel to descending colon cancer, R1 resection of recurrent pelvic rectal cancer     Name of procedure:   exploratory laparotomy with extensive adhesiolysis lasting more than 2 hours ( 22 modifier), low anterior resection with Soila's procedure / end ascending colostomy, transverse and left colectomy with EN bloc small bowel resection, splenic flexure mobilization, hepatic flexure mobilization, appendectomy, pedicled omental graft to pelvis, ventral hernia repair primary    Surgeon:   MEGHAN Hong MD    Assistant surgeon:  Edward Ruelas MD    Type of anesthesia:  GETA    EBL:   1000  Cc's    Drains:   none    Specimen:     Portion of rectum including prior colorectal anastomosis  Left and transverse colon with attached jejunum  Pelvic margins - posterior  Pelvic margins - right posterior, left posterior  Hernia sac  Incisional scar    Findings:   Wide scar  Hernia prior midline incision, simple  Extensive adhesions of small bowel  necessitating extensive lysis of adhesions lasting more than 2 hours   Left colonic tumor with adherent jejunum requiring EN bloc resection and transverse colectomy   Pelvic recurrence adjacent to prior colorectal anastomosis.  There was marked radiation change   Making sharp dissection off of the sacrum tedious and challenging.  Bleeding from branches of  the left internal iliac were controlled with Prolene suture.  Oozing from presacral vein control with cautery and with hemostatic agents.  Following low anterior resection with stapling at the pelvic floor soft tissue biopsies of the presacral fascia and  parietal peritoneum of the pelvis revealed microscopic adenocarcinoma on frozen section.    Tissues of the pelvis were noted in the fibrotic and markedly altered including the rectal stump.  It was felt to be unsuitable for anastomosis.  R1 resection also factor into the decision not to perform colo-anal anastomosis and to proceed with end colostomy.    Technique in detail:   The patient is brought to the operating room positively identified and placed on the operating table in supine position.  An enhanced recovery pathway was initiated preoperatively including wound prophylaxis, deep venous thrombosis prophylaxis and multimodal analgesia.     He underwent general endotracheal anesthesia without complication.  Left arm was wrapped with foam and tucked at his side.  He was positioned in the modified lithotomy position with padded Yellofin stirrups.  A patient positioning system was employed.    A critical time-out was performed.    The urology team came into the operating room and performed cystoscopy with bilateral ureteral catheter insertion.  Bowers catheter was inserted.    The patient's abdomen and perineum were prepared and draped in usual fashion.    The patient was explored through a long midline incision from the epigastrium the symphysis pubis excising a prior wide scar in the midline.  The scar was sent to Surgical pathology.  The abdomen was entered sharply with care being taken to avoid injury to the underlying viscera.  We encountered dense adhesions of the small bowel to the anterior abdominal wall which required sharp adhesiolysis.  Essentially, the abdomen was frozen with small bowel adherent to the anterior abdominal wall, retroperitoneum,  intra-loop adhesions as well as adhesions within the pelvis.  These were simple postoperative adhesions for the most part with 1 area of a malignant adhesion of jejunum to the left colon adenocarcinoma.  This necessitated EN bloc resection.  Surgical staplers were used to divide the bowel proximal and distal to the adhesion and the mesentery was taken down with the LigaSure impact.    Lysis of the adhesions lasted more than 2 hours  justifies 22 modifier.  We ran the small bowel from the ligament of Treitz to the ileocecal valve inspecting all peritoneal surfaces as well as the mesentery itself.    One serosal defect was repaired with imbricating Vicryl suture.  There was no evidence of peritoneal disease.    All solid organs were carefully inspected and noted to be unremarkable.  Peritoneal surfaces including the diaphragms were noted to be unremarkable.  There was no ascites.    We proceeded with mobilization of the left colon.  This had been previously mobilized at his 1st operation.  Adhesions of the colon to the retroperitoneum were noted to be white dense and required sharp dissection.  The left ureter was noted to be densely adherent to the left colonic mesentery laterally and this was sharply dissected free . The palpable ureteral catheter greatly assisted in this regard and the ureter was carefully preserved throughout its course.    The splenic flexure was noted to be white I had and again extensively adherent following its prior mobilization.  To mobilize the left colon the repeat splenic flexure mobilization was necessary in the bowel and mesentery were dissected off of Gerota's fascia and the inferior border of the pancreas back to the 4th portion of the duodenum .    The right side of the left colonic mesentery was scored.  The descending colon mesentery was completely mobilized off of the retroperitoneum to the sacral promontory.      Redo low anterior resection was then undertaken.  Headlight  illumination and lighted pelvic retractors were employed.  Sharp dissection was performed using a combination of Bovie electrocautery and primarily sharp scissor dissection.  This was difficult, tedious and with the intention of obtaining negative surgical margins including the parietal peritoneum overlying the sacrum.  The patient's body habitus and narrow pelvis and extensive irradiation resulted in difficult mobilization and dissection.  Bleeding from a branch of the left internal iliac vein was controlled with Prolene suture.    The measured blood loss was indicative of this challenging surgical dissection.  Ultimately I was able to mobilize the mass, prior anastomosis and the residual mesorectum down to the level of the levator plate where the bowel was transected distally using the echelon 60 stapler with green cartridges.  Following mobilization of the specimen out of the pelvis  I then divided the left colon 10 cm proximal to the palpable left colonic mass.  However, there was palpable adenopathy in the mesentery proximal to this and in an attempt to perform a curative resection we proceeded with transverse colectomy which necessitated hepatic flexure mobilization and completely mobilizing the omentum off of the transverse mesocolon.  The bowel was transected proximally in the distal ascending colon using the intestinal staplers.  The transverse mesocolon was carefully mobilized to the proximal middle colic pedicle where vessels were divided and suture ligated.  The transverse colon mesentery was completely divided including an accessory middle colic artery which was to the left of the ligament of Treitz.  The specimen was handed off the operative field and hand carried to Surgical pathology with the specimen was photographed and opened in the presence of the pathology team for purposes of orientation of the specimen.    No visible residual pelvic tumor was noted.  However, random biopsies were performed of  the parietal peritoneum which was proximal to the recurrence in the mid to distal pelvis.  Unfortunately, in spite of the absence of any visible tumor each of the specimens were notable for adenocarcinoma.    Given R1 resection and given the marked radiation change the decision was to proceed with colostomy creation and anastomosis was abandoned in the pelvis.  A pedicled omental graft was harvested from the greater curvature of the stomach with care being taken to preserve the left gastroepiploic blood supply.  Omental the bili was confirmed with ICG scintigraphy.  The omentum was placed along the left colic gutter into the pelvis essential serving to quarantine the pelvis from the abdominal cavity and to prevent the small bowel from coming into contact with the pelvis.  Prophylactic appendectomy was performed using the surgical stapler with the mesoappendix  divided with the LigaSure impact.  Small bowel continuity was restored by performing isoperistaltic side-to-side anastomosis using the echelon 60 stapler with a blue cartridge.  The common defect was closed using continuous Vicryl suture.  Sutures were placed at the crotch of the anastomosis.      Colostomy creation was then undertaken.  Prior stoma site scar was excised using the knife.  Subcutaneous tissues and the anterior rectus fascia were opened vertically and the muscle was split bluntly.  The posterior sheath was opened under direct vision and the aperture was dilated to 2 finger-breadths.  The end of the ascending colon was brought up through the aperture without tension.  Care was taken to properly orient the mesentery.    The abdomen was carefully inspected.  Hemostasis was assured.  Small bowel was returned to its anatomic position.  Transversus abdominis plane block was performed.  We used a separate closing tray and changed gown and gloves.  Linea alba was approximated using 1 PDS suture.  Prior ventral incisional hernia was repaired primarily  excising the hernia sac.  The wound was irrigated with saline solution.  Hemostasis was assured.  The skin incision was repaired with subcuticular Monocryl and Dermabond.    The colostomy was matured as a Malia everted stoma using interrupted Vicryl suture.  Stoma appliance was placed over the colostomy.      The patient was returned to the supine position, awakened from anesthesia and extubated without incident.  He was transported to the recovery area in stable condition.

## 2024-10-03 NOTE — NURSING
Patient arrived to unit via stretcher with PCA pump and IV fluids running. Pt was transferred to bed and made comfortable. Vital signs obtained, SpO2 and respirations slightly abnormal at 94% and 23, respectively. Bed wheels locked and in lowest position, call light within reach.

## 2024-10-03 NOTE — ANESTHESIA PROCEDURE NOTES
Intubation    Date/Time: 10/3/2024 7:27 AM    Performed by: Mary Merino CRNA  Authorized by: Dee Mo MD    Intubation:     Induction:  Intravenous    Intubated:  Postinduction    Mask Ventilation:  Moderately difficult with oral airway    Attempts:  1    Attempted By:  Student    Method of Intubation:  Video laryngoscopy    Blade:  Ordoñez 3    Laryngeal View Grade: Grade I - full view of cords      Difficult Airway Encountered?: No      Complications:  None    Airway Device:  Oral endotracheal tube    Airway Device Size:  7.5    Tube secured:  21    Secured at:  The lips    Placement Verified By:  Capnometry    DIFFICULT INTUBATION DESCRIPTOR: Mason.    Findings Post-Intubation:  BS equal bilateral and atraumatic/condition of teeth unchanged

## 2024-10-03 NOTE — PROGRESS NOTES
line utilized as pt woke speaking only Lithuanian.  Wilber (#[id number]) translated that the pt has known Lithuanian for a long time, but is American and unsure why he can only speak in Lithuanian at the moment.  RN utilized the opportunity to ensure that pt is educated regarding PCA, pain control, unit orientation, procedures undergone, and appropriate expectations for stay in PACU.  Pt verbalized understanding of both teaching and spoken American English.

## 2024-10-03 NOTE — PROGRESS NOTES
Telemetry monitor #[id number] applied to pt and called in to monitor room in preparation for admission.

## 2024-10-04 PROBLEM — T38.0X5A STEROID-INDUCED HYPERGLYCEMIA: Status: ACTIVE | Noted: 2024-10-04

## 2024-10-04 PROBLEM — R73.9 STEROID-INDUCED HYPERGLYCEMIA: Status: ACTIVE | Noted: 2024-10-04

## 2024-10-04 LAB
ANION GAP SERPL CALC-SCNC: 16 MMOL/L (ref 8–16)
ANION GAP SERPL CALC-SCNC: 6 MMOL/L (ref 8–16)
ANION GAP SERPL CALC-SCNC: 7 MMOL/L (ref 8–16)
ANION GAP SERPL CALC-SCNC: 7 MMOL/L (ref 8–16)
B-OH-BUTYR BLD STRIP-SCNC: 1.7 MMOL/L (ref 0–0.5)
BASOPHILS # BLD AUTO: 0.02 K/UL (ref 0–0.2)
BASOPHILS NFR BLD: 0.1 % (ref 0–1.9)
BUN SERPL-MCNC: 24 MG/DL (ref 8–23)
BUN SERPL-MCNC: 27 MG/DL (ref 8–23)
BUN SERPL-MCNC: 29 MG/DL (ref 8–23)
BUN SERPL-MCNC: 29 MG/DL (ref 8–23)
CALCIUM SERPL-MCNC: 7.6 MG/DL (ref 8.7–10.5)
CALCIUM SERPL-MCNC: 7.7 MG/DL (ref 8.7–10.5)
CALCIUM SERPL-MCNC: 7.9 MG/DL (ref 8.7–10.5)
CALCIUM SERPL-MCNC: 8 MG/DL (ref 8.7–10.5)
CHLORIDE SERPL-SCNC: 109 MMOL/L (ref 95–110)
CHLORIDE SERPL-SCNC: 113 MMOL/L (ref 95–110)
CO2 SERPL-SCNC: 11 MMOL/L (ref 23–29)
CO2 SERPL-SCNC: 18 MMOL/L (ref 23–29)
CO2 SERPL-SCNC: 20 MMOL/L (ref 23–29)
CO2 SERPL-SCNC: 21 MMOL/L (ref 23–29)
CREAT SERPL-MCNC: 1.2 MG/DL (ref 0.5–1.4)
CREAT SERPL-MCNC: 1.3 MG/DL (ref 0.5–1.4)
CREAT SERPL-MCNC: 1.5 MG/DL (ref 0.5–1.4)
CREAT SERPL-MCNC: 1.5 MG/DL (ref 0.5–1.4)
DIFFERENTIAL METHOD BLD: ABNORMAL
EOSINOPHIL # BLD AUTO: 0 K/UL (ref 0–0.5)
EOSINOPHIL NFR BLD: 0 % (ref 0–8)
ERYTHROCYTE [DISTWIDTH] IN BLOOD BY AUTOMATED COUNT: 14.6 % (ref 11.5–14.5)
EST. GFR  (NO RACE VARIABLE): 49.8 ML/MIN/1.73 M^2
EST. GFR  (NO RACE VARIABLE): 49.8 ML/MIN/1.73 M^2
EST. GFR  (NO RACE VARIABLE): 59.1 ML/MIN/1.73 M^2
EST. GFR  (NO RACE VARIABLE): >60 ML/MIN/1.73 M^2
GLUCOSE SERPL-MCNC: 290 MG/DL (ref 70–110)
GLUCOSE SERPL-MCNC: 363 MG/DL (ref 70–110)
GLUCOSE SERPL-MCNC: 417 MG/DL (ref 70–110)
GLUCOSE SERPL-MCNC: 85 MG/DL (ref 70–110)
HCT VFR BLD AUTO: 30.3 % (ref 40–54)
HGB BLD-MCNC: 10.5 G/DL (ref 14–18)
IMM GRANULOCYTES # BLD AUTO: 0.06 K/UL (ref 0–0.04)
IMM GRANULOCYTES NFR BLD AUTO: 0.4 % (ref 0–0.5)
LYMPHOCYTES # BLD AUTO: 0.7 K/UL (ref 1–4.8)
LYMPHOCYTES NFR BLD: 4.1 % (ref 18–48)
MAGNESIUM SERPL-MCNC: 1.7 MG/DL (ref 1.6–2.6)
MCH RBC QN AUTO: 31.3 PG (ref 27–31)
MCHC RBC AUTO-ENTMCNC: 34.7 G/DL (ref 32–36)
MCV RBC AUTO: 90 FL (ref 82–98)
MONOCYTES # BLD AUTO: 1.7 K/UL (ref 0.3–1)
MONOCYTES NFR BLD: 10.2 % (ref 4–15)
NEUTROPHILS # BLD AUTO: 14.5 K/UL (ref 1.8–7.7)
NEUTROPHILS NFR BLD: 85.2 % (ref 38–73)
NRBC BLD-RTO: 0 /100 WBC
PHOSPHATE SERPL-MCNC: 5.3 MG/DL (ref 2.7–4.5)
PLATELET # BLD AUTO: 203 K/UL (ref 150–450)
PMV BLD AUTO: 11 FL (ref 9.2–12.9)
POCT GLUCOSE: 106 MG/DL (ref 70–110)
POCT GLUCOSE: 115 MG/DL (ref 70–110)
POCT GLUCOSE: 121 MG/DL (ref 70–110)
POCT GLUCOSE: 177 MG/DL (ref 70–110)
POCT GLUCOSE: 259 MG/DL (ref 70–110)
POCT GLUCOSE: 297 MG/DL (ref 70–110)
POCT GLUCOSE: 310 MG/DL (ref 70–110)
POCT GLUCOSE: 322 MG/DL (ref 70–110)
POCT GLUCOSE: 336 MG/DL (ref 70–110)
POCT GLUCOSE: 368 MG/DL (ref 70–110)
POCT GLUCOSE: 413 MG/DL (ref 70–110)
POCT GLUCOSE: 439 MG/DL (ref 70–110)
POCT GLUCOSE: 441 MG/DL (ref 70–110)
POCT GLUCOSE: 459 MG/DL (ref 70–110)
POCT GLUCOSE: 470 MG/DL (ref 70–110)
POCT GLUCOSE: 97 MG/DL (ref 70–110)
POTASSIUM SERPL-SCNC: 4.2 MMOL/L (ref 3.5–5.1)
POTASSIUM SERPL-SCNC: 4.2 MMOL/L (ref 3.5–5.1)
POTASSIUM SERPL-SCNC: 4.8 MMOL/L (ref 3.5–5.1)
POTASSIUM SERPL-SCNC: 5.3 MMOL/L (ref 3.5–5.1)
RBC # BLD AUTO: 3.36 M/UL (ref 4.6–6.2)
SODIUM SERPL-SCNC: 135 MMOL/L (ref 136–145)
SODIUM SERPL-SCNC: 136 MMOL/L (ref 136–145)
SODIUM SERPL-SCNC: 137 MMOL/L (ref 136–145)
SODIUM SERPL-SCNC: 138 MMOL/L (ref 136–145)
WBC # BLD AUTO: 17.03 K/UL (ref 3.9–12.7)

## 2024-10-04 PROCEDURE — 25000003 PHARM REV CODE 250

## 2024-10-04 PROCEDURE — 25000003 PHARM REV CODE 250: Performed by: STUDENT IN AN ORGANIZED HEALTH CARE EDUCATION/TRAINING PROGRAM

## 2024-10-04 PROCEDURE — 36415 COLL VENOUS BLD VENIPUNCTURE: CPT | Performed by: COLON & RECTAL SURGERY

## 2024-10-04 PROCEDURE — 84100 ASSAY OF PHOSPHORUS: CPT | Performed by: STUDENT IN AN ORGANIZED HEALTH CARE EDUCATION/TRAINING PROGRAM

## 2024-10-04 PROCEDURE — 36415 COLL VENOUS BLD VENIPUNCTURE: CPT | Mod: XB

## 2024-10-04 PROCEDURE — 20600001 HC STEP DOWN PRIVATE ROOM

## 2024-10-04 PROCEDURE — 83735 ASSAY OF MAGNESIUM: CPT | Performed by: STUDENT IN AN ORGANIZED HEALTH CARE EDUCATION/TRAINING PROGRAM

## 2024-10-04 PROCEDURE — 36415 COLL VENOUS BLD VENIPUNCTURE: CPT | Performed by: NURSE PRACTITIONER

## 2024-10-04 PROCEDURE — 82010 KETONE BODYS QUAN: CPT | Performed by: NURSE PRACTITIONER

## 2024-10-04 PROCEDURE — 85025 COMPLETE CBC W/AUTO DIFF WBC: CPT | Performed by: STUDENT IN AN ORGANIZED HEALTH CARE EDUCATION/TRAINING PROGRAM

## 2024-10-04 PROCEDURE — 99223 1ST HOSP IP/OBS HIGH 75: CPT | Mod: ,,, | Performed by: NURSE PRACTITIONER

## 2024-10-04 PROCEDURE — 36415 COLL VENOUS BLD VENIPUNCTURE: CPT | Mod: XB | Performed by: STUDENT IN AN ORGANIZED HEALTH CARE EDUCATION/TRAINING PROGRAM

## 2024-10-04 PROCEDURE — 80048 BASIC METABOLIC PNL TOTAL CA: CPT | Mod: 91

## 2024-10-04 PROCEDURE — 80048 BASIC METABOLIC PNL TOTAL CA: CPT | Mod: 91 | Performed by: COLON & RECTAL SURGERY

## 2024-10-04 PROCEDURE — 80048 BASIC METABOLIC PNL TOTAL CA: CPT | Performed by: STUDENT IN AN ORGANIZED HEALTH CARE EDUCATION/TRAINING PROGRAM

## 2024-10-04 PROCEDURE — 63600175 PHARM REV CODE 636 W HCPCS: Performed by: STUDENT IN AN ORGANIZED HEALTH CARE EDUCATION/TRAINING PROGRAM

## 2024-10-04 RX ORDER — TAMSULOSIN HYDROCHLORIDE 0.4 MG/1
0.4 CAPSULE ORAL DAILY
Status: DISCONTINUED | OUTPATIENT
Start: 2024-10-04 | End: 2024-10-08 | Stop reason: HOSPADM

## 2024-10-04 RX ORDER — HYDROMORPHONE HYDROCHLORIDE 1 MG/ML
0.5 INJECTION, SOLUTION INTRAMUSCULAR; INTRAVENOUS; SUBCUTANEOUS
Status: DISCONTINUED | OUTPATIENT
Start: 2024-10-04 | End: 2024-10-08 | Stop reason: HOSPADM

## 2024-10-04 RX ORDER — OXYCODONE HYDROCHLORIDE 10 MG/1
10 TABLET ORAL EVERY 4 HOURS PRN
Status: DISCONTINUED | OUTPATIENT
Start: 2024-10-04 | End: 2024-10-08 | Stop reason: HOSPADM

## 2024-10-04 RX ORDER — GABAPENTIN 300 MG/1
300 CAPSULE ORAL 3 TIMES DAILY
Status: DISCONTINUED | OUTPATIENT
Start: 2024-10-04 | End: 2024-10-08 | Stop reason: HOSPADM

## 2024-10-04 RX ORDER — OXYCODONE HYDROCHLORIDE 5 MG/1
5 TABLET ORAL EVERY 4 HOURS PRN
Status: DISCONTINUED | OUTPATIENT
Start: 2024-10-04 | End: 2024-10-08 | Stop reason: HOSPADM

## 2024-10-04 RX ORDER — LANOLIN ALCOHOL/MO/W.PET/CERES
400 CREAM (GRAM) TOPICAL
Status: COMPLETED | OUTPATIENT
Start: 2024-10-04 | End: 2024-10-04

## 2024-10-04 RX ADMIN — SODIUM CHLORIDE: 9 INJECTION, SOLUTION INTRAVENOUS at 03:10

## 2024-10-04 RX ADMIN — INSULIN HUMAN 4 UNITS/HR: 1 INJECTION, SOLUTION INTRAVENOUS at 09:10

## 2024-10-04 RX ADMIN — OXYCODONE 5 MG: 5 TABLET ORAL at 05:10

## 2024-10-04 RX ADMIN — ACETAMINOPHEN 1000 MG: 500 TABLET ORAL at 09:10

## 2024-10-04 RX ADMIN — OXYCODONE HYDROCHLORIDE 10 MG: 10 TABLET ORAL at 09:10

## 2024-10-04 RX ADMIN — ALVIMOPAN 12 MG: 12 CAPSULE ORAL at 08:10

## 2024-10-04 RX ADMIN — Medication 400 MG: at 08:10

## 2024-10-04 RX ADMIN — SODIUM CHLORIDE 500 ML: 9 INJECTION, SOLUTION INTRAVENOUS at 08:10

## 2024-10-04 RX ADMIN — Medication 400 MG: at 12:10

## 2024-10-04 RX ADMIN — TAMSULOSIN HYDROCHLORIDE 0.4 MG: 0.4 CAPSULE ORAL at 08:10

## 2024-10-04 RX ADMIN — PANTOPRAZOLE SODIUM 40 MG: 40 TABLET, DELAYED RELEASE ORAL at 06:10

## 2024-10-04 RX ADMIN — ACETAMINOPHEN 1000 MG: 10 INJECTION, SOLUTION INTRAVENOUS at 02:10

## 2024-10-04 RX ADMIN — INSULIN HUMAN 9.25 UNITS/HR: 1 INJECTION, SOLUTION INTRAVENOUS at 07:10

## 2024-10-04 RX ADMIN — ACETAMINOPHEN 1000 MG: 10 INJECTION, SOLUTION INTRAVENOUS at 09:10

## 2024-10-04 RX ADMIN — GABAPENTIN 300 MG: 300 CAPSULE ORAL at 08:10

## 2024-10-04 RX ADMIN — INSULIN ASPART 10 UNITS: 100 INJECTION, SOLUTION INTRAVENOUS; SUBCUTANEOUS at 07:10

## 2024-10-04 RX ADMIN — GABAPENTIN 300 MG: 300 CAPSULE ORAL at 02:10

## 2024-10-04 RX ADMIN — ONDANSETRON 4 MG: 2 INJECTION INTRAMUSCULAR; INTRAVENOUS at 02:10

## 2024-10-04 NOTE — SUBJECTIVE & OBJECTIVE
Interval HPI:   Overnight events:  No acute events overnight. Patient in room 1043/1043 A. Blood glucose worsening. BG above goal on current insulin regimen (SSI + basal insulin). Steroid use- Dexamethasone  4 mg injection Once postoperatively. 1 Day Post-Op  Renal function- Abnormal - 1.5   Vasopressors-  None     Lab Results   Component Value Date    CREATININE 1.5 (H) 10/04/2024         Endocrine will continue to follow and manage insulin orders inpatient.         Diet Clear liquid (no sugar)/Bariatric     Eatin%   Nausea: No  Hypoglycemia and intervention: No  Fever: No  TPN and/or TF: No      PMH, PSH, FH, SH updated and reviewed     ROS:    Review of Systems   Constitutional:  Negative for unexpected weight change.   Eyes:  Negative for visual disturbance.   Respiratory:  Negative for cough.    Cardiovascular:  Negative for chest pain.   Gastrointestinal:  Negative for nausea and vomiting.   Endocrine: Negative for polydipsia and polyuria.   Skin:  Negative for rash.   Neurological:  Negative for syncope.   Psychiatric/Behavioral:  Negative for agitation and dysphoric mood.        Current Medications and/or Treatments Impacting Glycemic Control  Immunotherapy:    Immunosuppressants       None          Steroids:   Hormones (From admission, onward)      None          Pressors:    Autonomic Drugs (From admission, onward)      None          Hyperglycemia/Diabetes Medications:   Antihyperglycemics (From admission, onward)      Start     Stop Route Frequency Ordered    10/04/24 0945  insulin regular in 0.9 % NaCl 100 unit/100 mL (1 unit/mL) infusion        Question:  Enter initial dose from Infusion Protocol Chart (Units/hr):  Answer:  4    -- IV Continuous 10/04/24 0834             PHYSICAL EXAMINATION:  Vitals:    10/04/24 1209   BP: (!) 111/55   Pulse: 100   Resp: 18   Temp: 98.1 °F (36.7 °C)     Body mass index is 32.74 kg/m².     Physical Exam  Constitutional:       Appearance: He is well-developed.    HENT:      Head: Normocephalic.   Eyes:      Conjunctiva/sclera: Conjunctivae normal.   Pulmonary:      Effort: Pulmonary effort is normal.   Musculoskeletal:         General: Normal range of motion.   Skin:     General: Skin is warm.      Findings: No rash.   Neurological:      Mental Status: He is alert and oriented to person, place, and time.

## 2024-10-04 NOTE — NURSING
Pt was not seen by PT/OT today and was requesting to sit edge of bed. Pt was a heavy x1 assist to sit edge of bed and reported dizziness. All VSS WNL, /56, , 97-98% on 1L NC. He sat edge for approx 5 min before requesting to lay back down. Pt then boosted himself in the bed using railings and requested all 4 be left up for him to reposition himself. Pt reminded throughout activity to splint abdomen .

## 2024-10-04 NOTE — HPI
Reason for Consult: Management of T2DM, Hyperglycemia     Surgical Procedure and Date: 10/03/2024 - RESECTION, RECTUM, LOW ANTERIOR    Diabetes diagnosis year: > 10 years ago    Home Diabetes Medications:    Levimir 21 units nightly  Lispro 25 units nightly (Confirmed with patient this is his current regimen.  Patient reports decreased appetite.  Previously prescribed per Denise De Leon NP Levemir 25units bid and Humalog 22units with meals.)    How often checking glucose at home? One   BG readings on regimen: 90s in AM hours  Hypoglycemia on the regimen?  No  Missed doses on regimen?  No    Diabetes Complications include:     Diabetic peripheral neuropathy     Complicating diabetes co morbidities:   Active Cancer and Glucocorticoid use       HPI:   Patient is a 68 y.o. male with a diagnosis of with history of recurrent upper rectal cancer - anastomotic and new primary at 40 cm from anal verge who underwent boost CXRT with good response on flexible sigmoidoscopy of anastomotic recurrence to boost XRT.  POD 1 from ex lap, LAR, SBR, and end colostomy creation. Endocrinology consulted for BG management

## 2024-10-04 NOTE — SUBJECTIVE & OBJECTIVE
Subjective:     Interval History: POD 1 from ex lap, LAR, SBR, and end colostomy creation. Pain is controlled. Complaining of heart burn and slight nausea but no vomiting. Hgb stable. BUN and CR rising. Glucose in 400 this am.     Post-Op Info:  Procedure(s) (LRB):  RESECTION, RECTUM, LOW ANTERIOR (N/A)  CYSTOSCOPY, WITH URETERAL STENT INSERTION (Bilateral)  APPENDECTOMY  LYSIS, ADHESIONS  LAPAROTOMY, EXPLORATORY  MOBILIZATION, SPLENIC FLEXURE  MOBILIZATION, HEPATIC FLEXURE  COLECTOMY, TRANSVERSE (Left)  COLONOSCOPY (N/A)  REPAIR, HERNIA, VENTRAL  SIGMOIDOSCOPY, FLEXIBLE  EXCISION, SMALL INTESTINE  WRAP-OMENTAL  APPENDECTOMY   1 Day Post-Op      Medications:  Continuous Infusions:   0.9% NaCl   Intravenous Continuous 40 mL/hr at 10/03/24 1605 New Bag at 10/03/24 1605    hydromorphone in 0.9 % NaCl 6 mg/30 ml   Intravenous Continuous   Restarted at 10/03/24 1604     Scheduled Meds:   acetaminophen  1,000 mg Intravenous Q8H    Followed by    acetaminophen  1,000 mg Oral Q8H    alvimopan  12 mg Oral BID    insulin glargine U-100  12 Units Subcutaneous QHS    pantoprazole  40 mg Oral Before breakfast    sodium chloride 0.9%  500 mL Intravenous Once    tamsulosin  0.4 mg Oral Daily     PRN Meds:   acetaminophen 1,000 mg/100 mL (10 mg/mL) injection 1,000 mg    Followed by    acetaminophen tablet 1,000 mg    alvimopan capsule 12 mg    insulin glargine U-100 (Lantus) pen 12 Units    pantoprazole EC tablet 40 mg    sodium chloride 0.9% bolus 500 mL 500 mL    tamsulosin 24 hr capsule 0.4 mg        Objective:     Vital Signs (Most Recent):  Temp: 97.9 °F (36.6 °C) (10/04/24 0335)  Pulse: 93 (10/04/24 0335)  Resp: 18 (10/04/24 0335)  BP: (!) 109/52 (10/04/24 0335)  SpO2: 95 % (10/04/24 0335) Vital Signs (24h Range):  Temp:  [97 °F (36.1 °C)-97.9 °F (36.6 °C)] 97.9 °F (36.6 °C)  Pulse:  [70-94] 93  Resp:  [16-24] 18  SpO2:  [94 %-100 %] 95 %  BP: ()/(50-68) 109/52     Intake/Output - Last 3 Shifts         10/02  "0700  10/03 0659 10/03 0700  10/04 0659    Blood  700    IV Piggyback  5439.2    Total Intake(mL/kg)  6139.2 (56.1)    Urine (mL/kg/hr)  925 (0.4)    Blood  1000    Total Output  1925    Net  +4214.2                   Physical Exam  Constitutional:       Appearance: Normal appearance.   HENT:      Head: Normocephalic and atraumatic.   Eyes:      Conjunctiva/sclera: Conjunctivae normal.   Cardiovascular:      Rate and Rhythm: Normal rate.   Pulmonary:      Effort: Pulmonary effort is normal.   Abdominal:      Comments: Mildly distended by soft. Midline incision c,d,I. Colostomy healthy and pink without any contents in the bag.    Genitourinary:     Comments: velasquez  Skin:     General: Skin is warm and dry.   Neurological:      General: No focal deficit present.      Mental Status: He is alert.   Psychiatric:         Mood and Affect: Mood normal.                Significant Labs:  CBC:   Recent Labs   Lab 10/04/24  0532   WBC 17.03*   RBC 3.36*   HGB 10.5*   HCT 30.3*      MCV 90   MCH 31.3*   MCHC 34.7     CMP:   Recent Labs   Lab 10/03/24  1600 10/04/24  0532   * 417*   CALCIUM 6.8* 8.0*   ALBUMIN 2.6*  --    PROT 4.1*  --     136   K 4.6 5.3*   CO2 21* 11*   * 109   BUN 15 24*   CREATININE 0.7 1.5*   ALKPHOS 76  --    ALT 14  --    AST 20  --    BILITOT 1.9*  --      CRP: No results for input(s): "CRP" in the last 168 hours.  All pertinent lab results within the last 24 hours have been reviewed.     Significant Diagnostics:  I have reviewed all pertinent imaging results/findings within the past 24 hours.  "
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
## 2024-10-04 NOTE — PT/OT/SLP PROGRESS
Physical Therapy      Patient Name:  KIRSTY Mason   MRN:  [id number]    Patient not seen today secondary to  (blood glucose taken by nursing staff while therapist in room and reading 441. Pt not appropriate for evaluation with PT. Will f/u tomorrow as appropriate.).

## 2024-10-04 NOTE — PROGRESS NOTES
[facility]  Colorectal Surgery  Progress Note    Patient Name: KIRSTY Mason  MRN: [id number]  Admission Date: 10/3/2024  Hospital Length of Stay: 1 days  Attending Physician: MEGHAN Hong MD    Subjective:     Interval History: POD 1 from ex lap, LAR, SBR, and end colostomy creation. Pain is controlled. Complaining of heart burn and slight nausea but no vomiting. Hgb stable. BUN and CR rising. Glucose in 400 this am.     Post-Op Info:  Procedure(s) (LRB):  RESECTION, RECTUM, LOW ANTERIOR (N/A)  CYSTOSCOPY, WITH URETERAL STENT INSERTION (Bilateral)  APPENDECTOMY  LYSIS, ADHESIONS  LAPAROTOMY, EXPLORATORY  MOBILIZATION, SPLENIC FLEXURE  MOBILIZATION, HEPATIC FLEXURE  COLECTOMY, TRANSVERSE (Left)  COLONOSCOPY (N/A)  REPAIR, HERNIA, VENTRAL  SIGMOIDOSCOPY, FLEXIBLE  EXCISION, SMALL INTESTINE  WRAP-OMENTAL  APPENDECTOMY   1 Day Post-Op      Medications:  Continuous Infusions:   0.9% NaCl   Intravenous Continuous 40 mL/hr at 10/03/24 1605 New Bag at 10/03/24 1605    hydromorphone in 0.9 % NaCl 6 mg/30 ml   Intravenous Continuous   Restarted at 10/03/24 1604     Scheduled Meds:   acetaminophen  1,000 mg Intravenous Q8H    Followed by    acetaminophen  1,000 mg Oral Q8H    alvimopan  12 mg Oral BID    insulin glargine U-100  12 Units Subcutaneous QHS    pantoprazole  40 mg Oral Before breakfast    sodium chloride 0.9%  500 mL Intravenous Once    tamsulosin  0.4 mg Oral Daily     PRN Meds:   acetaminophen 1,000 mg/100 mL (10 mg/mL) injection 1,000 mg    Followed by    acetaminophen tablet 1,000 mg    alvimopan capsule 12 mg    insulin glargine U-100 (Lantus) pen 12 Units    pantoprazole EC tablet 40 mg    sodium chloride 0.9% bolus 500 mL 500 mL    tamsulosin 24 hr capsule 0.4 mg        Objective:     Vital Signs (Most Recent):  Temp: 97.9 °F (36.6 °C) (10/04/24 0335)  Pulse: 93 (10/04/24 0335)  Resp: 18 (10/04/24 0335)  BP: (!) 109/52 (10/04/24 0335)  SpO2: 95 % (10/04/24 0335) Vital Signs (24h Range):  Temp:  [97 °F  "(36.1 °C)-97.9 °F (36.6 °C)] 97.9 °F (36.6 °C)  Pulse:  [70-94] 93  Resp:  [16-24] 18  SpO2:  [94 %-100 %] 95 %  BP: ()/(50-68) 109/52     Intake/Output - Last 3 Shifts         10/02 0700  10/03 0659 10/03 0700  10/04 0659    Blood  700    IV Piggyback  5439.2    Total Intake(mL/kg)  6139.2 (56.1)    Urine (mL/kg/hr)  925 (0.4)    Blood  1000    Total Output  1925    Net  +4214.2                   Physical Exam  Constitutional:       Appearance: Normal appearance.   HENT:      Head: Normocephalic and atraumatic.   Eyes:      Conjunctiva/sclera: Conjunctivae normal.   Cardiovascular:      Rate and Rhythm: Normal rate.   Pulmonary:      Effort: Pulmonary effort is normal.   Abdominal:      Comments: Mildly distended by soft. Midline incision c,d,I. Colostomy healthy and pink without any contents in the bag.    Genitourinary:     Comments: velasquez  Skin:     General: Skin is warm and dry.   Neurological:      General: No focal deficit present.      Mental Status: He is alert.   Psychiatric:         Mood and Affect: Mood normal.                Significant Labs:  CBC:   Recent Labs   Lab 10/04/24  0532   WBC 17.03*   RBC 3.36*   HGB 10.5*   HCT 30.3*      MCV 90   MCH 31.3*   MCHC 34.7     CMP:   Recent Labs   Lab 10/03/24  1600 10/04/24  0532   * 417*   CALCIUM 6.8* 8.0*   ALBUMIN 2.6*  --    PROT 4.1*  --     136   K 4.6 5.3*   CO2 21* 11*   * 109   BUN 15 24*   CREATININE 0.7 1.5*   ALKPHOS 76  --    ALT 14  --    AST 20  --    BILITOT 1.9*  --      CRP: No results for input(s): "CRP" in the last 168 hours.  All pertinent lab results within the last 24 hours have been reviewed.     Significant Diagnostics:  I have reviewed all pertinent imaging results/findings within the past 24 hours.  Assessment/Plan:     * Rectal cancer  68yM hx of locally recurrent high rectal cancer at the anastomosis and synchronous descending colon mass 40cm from anal verge now s/p ex-lap LAR with transverse colon " resection, SBR, and end colostomy creation 10/4/24 with Dr. Hong.     - PCA and MMPR  - remain on CLD  - 500cc bolus NS  - half home basal insulin and SSI; glucose 400 this am will consult endocrine; appreciate recs  - GI ppx  - PT/OT   - IS  - holding DVT ppx              Munira Camacho MD  Colorectal Surgery  [facility]

## 2024-10-04 NOTE — ANESTHESIA POSTPROCEDURE EVALUATION
Anesthesia Post Evaluation    Patient: KIRSTY Mason    Procedure(s) Performed: Procedure(s) (LRB):  RESECTION, RECTUM, LOW ANTERIOR (N/A)  CYSTOSCOPY, WITH URETERAL STENT INSERTION (Bilateral)  APPENDECTOMY  LYSIS, ADHESIONS  LAPAROTOMY, EXPLORATORY  MOBILIZATION, SPLENIC FLEXURE  MOBILIZATION, HEPATIC FLEXURE  COLECTOMY, TRANSVERSE (Left)  COLONOSCOPY (N/A)  REPAIR, HERNIA, VENTRAL  SIGMOIDOSCOPY, FLEXIBLE  EXCISION, SMALL INTESTINE  WRAP-OMENTAL  APPENDECTOMY    Final Anesthesia Type: general      Patient location during evaluation: PACU  Patient participation: Yes- Able to Participate  Level of consciousness: awake and alert  Post-procedure vital signs: reviewed and stable  Pain management: adequate  Airway patency: patent    PONV status at discharge: No PONV  Anesthetic complications: no      Cardiovascular status: blood pressure returned to baseline  Respiratory status: unassisted  Hydration status: euvolemic  Follow-up not needed.          Vitals Value Taken Time   /55 10/04/24 1209   Temp 36.7 °C (98.1 °F) 10/04/24 1209   Pulse 100 10/04/24 1209   Resp 18 10/04/24 1209   SpO2 92 % 10/04/24 1209         Event Time   Out of Recovery 17:00:00         Pain/Beth Score: Pain Rating Prior to Med Admin: 5 (10/4/2024  9:52 AM)  Pain Rating Post Med Admin: 2 (10/4/2024 10:23 AM)  Beth Score: 9 (10/3/2024  7:23 PM)

## 2024-10-04 NOTE — PLAN OF CARE
[facility]  Initial Discharge Assessment       Primary Care Provider: Jass Mcdermott II, MD    Admission Diagnosis: Rectal cancer [C20]    Admission Date: 10/3/2024  Expected Discharge Date: 10/9/2024    Transition of Care Barriers: None    Payor: [employer] [facility] / Plan: [employer] CHOICES / Product Type: Medicare Advantage /     Extended Emergency Contact Information  Primary Emergency Contact: Maria Guadalupe Mason  Address: [address] [place] [address]  Home Phone: [phone]  Mobile Phone: [phone]  Relation: Spouse    Discharge Plan A: Home with family  Discharge Plan B: [facility]      [id number] - [facility] [address] [facility] [address]  [address]  [facility] [address]  Phone: [phone] Fax: [phone]      Initial Assessment (most recent)       Adult Discharge Assessment - 10/04/24 1126          Discharge Assessment    Assessment Type Discharge Planning Assessment     Confirmed/corrected address, phone number and insurance Yes     Source of Information health record     Does patient/caregiver understand observation status Yes     Communicated DA with patient/caregiver Yes     People in Home spouse     Do you expect to return to your current living situation? Yes     Do you have help at home or someone to help you manage your care at home? Yes     Prior to hospitilization cognitive status: Alert/Oriented     Current cognitive status: Alert/Oriented     Walking or Climbing Stairs Difficulty no     Dressing/Bathing Difficulty no     Home Accessibility wheelchair accessible     Home Layout Able to live on 1st floor     Equipment Currently Used at Home none     Readmission within 30 days? No     Do you currently have service(s) that help you manage your care at home? No     Do you take prescription medications? Yes     Do you have prescription coverage? Yes     Coverage PHN     Do  you have any problems affording any of your prescribed medications? No     Is the patient taking medications as prescribed? yes     How do you get to doctors appointments? family or friend will provide;car, drives self     Are you on dialysis? No     Do you take coumadin? No     Discharge Plan A Home with family     Discharge Plan B Home Health     DME Needed Upon Discharge  none     Transition of Care Barriers None        Transportation Needs    Has the lack of transportation kept you from medical appointments, meetings, work or from getting things needed for daily living? No        Social Isolation    How often do you feel lonely or isolated from those around you?  Rarely        Health Literacy    How often do you need to have someone help you when you read instructions, pamphlets, or other written material from your doctor or pharmacy? Rarely                        There have been no hospitalizations within the last 30 days. Verified pt PCP and preferred pharmacy. Pt stated not on Coumadin and is not receiving dialysis.      Discharge Plan A and Plan B have been determined by review of patient's clinical status, future medical and therapeutic needs, and coverage/benefits for post-acute care in coordination with multidisciplinary team members.       Savanna Warner LCSW  Case Management/[facility]  [phone]

## 2024-10-04 NOTE — CONSULTS
[facility] - [facility]  Endocrinology  Diabetes Consult Note    Consult Requested by: MEGHAN Hong MD   Reason for admit: Rectal cancer    HISTORY OF PRESENT ILLNESS:  Reason for Consult: Management of T2DM, Hyperglycemia     Surgical Procedure and Date: 10/03/2024 - RESECTION, RECTUM, LOW ANTERIOR    Diabetes diagnosis year: > 10 years ago    Home Diabetes Medications:    Levimir 21 units nightly  Lispro 25 units nightly (Confirmed with patient this is his current regimen.  Patient reports decreased appetite.  Previously prescribed per Denise De Leon NP Levemir 25units bid and Humalog 22units with meals.)    How often checking glucose at home? One   BG readings on regimen: 90s in AM hours  Hypoglycemia on the regimen?  No  Missed doses on regimen?  No    Diabetes Complications include:     Diabetic peripheral neuropathy     Complicating diabetes co morbidities:   Active Cancer and Glucocorticoid use       HPI:   Patient is a 68 y.o. male with a diagnosis of with history of recurrent upper rectal cancer - anastomotic and new primary at 40 cm from anal verge who underwent boost CXRT with good response on flexible sigmoidoscopy of anastomotic recurrence to boost XRT.  POD 1 from ex lap, LAR, SBR, and end colostomy creation. Endocrinology consulted for BG management          Interval HPI:   Overnight events:  No acute events overnight. Patient in room 1043/1043 A. Blood glucose worsening. BG above goal on current insulin regimen (SSI + basal insulin). Steroid use- Dexamethasone  4 mg injection Once postoperatively. 1 Day Post-Op  Renal function- Abnormal - 1.5   Vasopressors-  None     Lab Results   Component Value Date    CREATININE 1.5 (H) 10/04/2024         Endocrine will continue to follow and manage insulin orders inpatient.         Diet Clear liquid (no sugar)/Bariatric     Eatin%   Nausea: No  Hypoglycemia and intervention: No  Fever: No  TPN and/or TF: No      PMH, PSH, FH, SH updated and reviewed      ROS:    Review of Systems   Constitutional:  Negative for unexpected weight change.   Eyes:  Negative for visual disturbance.   Respiratory:  Negative for cough.    Cardiovascular:  Negative for chest pain.   Gastrointestinal:  Negative for nausea and vomiting.   Endocrine: Negative for polydipsia and polyuria.   Skin:  Negative for rash.   Neurological:  Negative for syncope.   Psychiatric/Behavioral:  Negative for agitation and dysphoric mood.        Current Medications and/or Treatments Impacting Glycemic Control  Immunotherapy:    Immunosuppressants       None          Steroids:   Hormones (From admission, onward)      None          Pressors:    Autonomic Drugs (From admission, onward)      None          Hyperglycemia/Diabetes Medications:   Antihyperglycemics (From admission, onward)      Start     Stop Route Frequency Ordered    10/04/24 0945  insulin regular in 0.9 % NaCl 100 unit/100 mL (1 unit/mL) infusion        Question:  Enter initial dose from Infusion Protocol Chart (Units/hr):  Answer:  4    -- IV Continuous 10/04/24 0834             PHYSICAL EXAMINATION:  Vitals:    10/04/24 1209   BP: (!) 111/55   Pulse: 100   Resp: 18   Temp: 98.1 °F (36.7 °C)     Body mass index is 32.74 kg/m².     Physical Exam  Constitutional:       Appearance: He is well-developed.   HENT:      Head: Normocephalic.   Eyes:      Conjunctiva/sclera: Conjunctivae normal.   Pulmonary:      Effort: Pulmonary effort is normal.   Musculoskeletal:         General: Normal range of motion.   Skin:     General: Skin is warm.      Findings: No rash.   Neurological:      Mental Status: He is alert and oriented to person, place, and time.            Labs Reviewed and Include   Recent Labs   Lab 10/03/24  1600 10/04/24  0532 10/04/24  1318   *   < > 363*   CALCIUM 6.8*   < > 7.9*   ALBUMIN 2.6*  --   --    PROT 4.1*  --   --       < > 135*   K 4.6   < > 4.2   CO2 21*   < > 20*   *   < > 109   BUN 15   < > 27*  "  CREATININE 0.7   < > 1.5*   ALKPHOS 76  --   --    ALT 14  --   --    AST 20  --   --    BILITOT 1.9*  --   --     < > = values in this interval not displayed.     Lab Results   Component Value Date    WBC 17.03 (H) 10/04/2024    HGB 10.5 (L) 10/04/2024    HCT 30.3 (L) 10/04/2024    MCV 90 10/04/2024     10/04/2024     No results for input(s): "TSH", "FREET4" in the last 168 hours.  Lab Results   Component Value Date    HGBA1C 8.3 (H) 08/28/2024       Nutritional status:   Body mass index is 32.74 kg/m².  Lab Results   Component Value Date    ALBUMIN 2.6 (L) 10/03/2024    ALBUMIN 4.3 08/28/2024    ALBUMIN 4.3 06/20/2023     Lab Results   Component Value Date    PREALBUMIN 23 04/26/2023       Estimated Creatinine Clearance: 58.6 mL/min (A) (based on SCr of 1.5 mg/dL (H)).    Accu-Checks  Recent Labs     10/03/24  0631 10/03/24  1558 10/04/24  0704 10/04/24  0837 10/04/24  0939 10/04/24  1057 10/04/24  1206 10/04/24  1301 10/04/24  1359 10/04/24  1454   POCTGLUCOSE 74 202* 459* 439* 413* 470* 441* 368* 336* 322*        ASSESSMENT and PLAN    Oncology  * Rectal cancer  Managed by Primary Care team.      Endocrine  Steroid-induced hyperglycemia    Patient perioperative steroids resulting in blood glucose excursions.       Uncontrolled type 2 diabetes mellitus with hyperglycemia  Endocrinology consulted for BG management.   BG goal 140-180      - IIP  - Requires intensive BG monitoring.   - BG checks q1hr  - Hypoglycemia protocol in place  - DKA patient work-up completed (Negative for DKA BHB 1.7)   - BMP Q4hr  - If worsening acidosis patient to be referred to fellow for DKA management.    - Notify endocrine if patient becomes hypokalemic (K < 3.3) and/or is not responding to replacement.   - Notify endocrine if patient requiring > 20 units/hr.      ** Please notify Endocrine for any change and/or advance in diet**  ** Please call Endocrine for any BG related issues **    Discharge Planning:  TBD. Please notify " endocrinology prior to discharge.            Plan discussed with patient, family, and RN at bedside.        Edgar Berger, DNP, FNP  Endocrinology  [facility]

## 2024-10-04 NOTE — PT/OT/SLP PROGRESS
Occupational Therapy      Patient Name:  KIRSTY Mason   MRN:  [id number]    Pt found up at bed level. While in room obtaining home environment and PLOF, nursing staff came to room to take blood glucose. Pt's blood glucose reading 441 and pt not appropriate for therapy session at this time.  Charge nurse is aware, will follow up when medically stable.     10/4/2024

## 2024-10-04 NOTE — ASSESSMENT & PLAN NOTE
68yM hx of locally recurrent high rectal cancer at the anastomosis and synchronous descending colon mass 40cm from anal verge now s/p ex-lap LAR with transverse colon resection, SBR, and end colostomy creation 10/4/24 with Dr. Hong.     - PCA and MMPR  - remain on CLD  - 500cc bolus NS  - half home basal insulin and SSI; glucose 400 this am will consult endocrine; appreciate recs  - GI ppx  - PT/OT   - IS  - holding DVT ppx
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
## 2024-10-05 LAB
ANION GAP SERPL CALC-SCNC: 6 MMOL/L (ref 8–16)
ANION GAP SERPL CALC-SCNC: 9 MMOL/L (ref 8–16)
ANION GAP SERPL CALC-SCNC: 9 MMOL/L (ref 8–16)
BASOPHILS # BLD AUTO: 0.01 K/UL (ref 0–0.2)
BASOPHILS NFR BLD: 0.1 % (ref 0–1.9)
BUN SERPL-MCNC: 21 MG/DL (ref 8–23)
BUN SERPL-MCNC: 23 MG/DL (ref 8–23)
BUN SERPL-MCNC: 23 MG/DL (ref 8–23)
BUN SERPL-MCNC: 24 MG/DL (ref 8–23)
BUN SERPL-MCNC: 27 MG/DL (ref 8–23)
CALCIUM SERPL-MCNC: 7.6 MG/DL (ref 8.7–10.5)
CALCIUM SERPL-MCNC: 7.7 MG/DL (ref 8.7–10.5)
CALCIUM SERPL-MCNC: 8.1 MG/DL (ref 8.7–10.5)
CALCIUM SERPL-MCNC: 8.1 MG/DL (ref 8.7–10.5)
CALCIUM SERPL-MCNC: 8.2 MG/DL (ref 8.7–10.5)
CHLORIDE SERPL-SCNC: 109 MMOL/L (ref 95–110)
CHLORIDE SERPL-SCNC: 111 MMOL/L (ref 95–110)
CHLORIDE SERPL-SCNC: 111 MMOL/L (ref 95–110)
CHLORIDE SERPL-SCNC: 113 MMOL/L (ref 95–110)
CHLORIDE SERPL-SCNC: 114 MMOL/L (ref 95–110)
CO2 SERPL-SCNC: 18 MMOL/L (ref 23–29)
CO2 SERPL-SCNC: 18 MMOL/L (ref 23–29)
CO2 SERPL-SCNC: 19 MMOL/L (ref 23–29)
CO2 SERPL-SCNC: 20 MMOL/L (ref 23–29)
CO2 SERPL-SCNC: 20 MMOL/L (ref 23–29)
CREAT SERPL-MCNC: 0.8 MG/DL (ref 0.5–1.4)
CREAT SERPL-MCNC: 0.9 MG/DL (ref 0.5–1.4)
CREAT SERPL-MCNC: 1 MG/DL (ref 0.5–1.4)
DIFFERENTIAL METHOD BLD: ABNORMAL
EOSINOPHIL # BLD AUTO: 0 K/UL (ref 0–0.5)
EOSINOPHIL NFR BLD: 0.3 % (ref 0–8)
ERYTHROCYTE [DISTWIDTH] IN BLOOD BY AUTOMATED COUNT: 14.6 % (ref 11.5–14.5)
EST. GFR  (NO RACE VARIABLE): >60 ML/MIN/1.73 M^2
GLUCOSE SERPL-MCNC: 174 MG/DL (ref 70–110)
GLUCOSE SERPL-MCNC: 181 MG/DL (ref 70–110)
GLUCOSE SERPL-MCNC: 182 MG/DL (ref 70–110)
GLUCOSE SERPL-MCNC: 193 MG/DL (ref 70–110)
GLUCOSE SERPL-MCNC: 99 MG/DL (ref 70–110)
HCT VFR BLD AUTO: 24.6 % (ref 40–54)
HGB BLD-MCNC: 8.1 G/DL (ref 14–18)
IMM GRANULOCYTES # BLD AUTO: 0.05 K/UL (ref 0–0.04)
IMM GRANULOCYTES NFR BLD AUTO: 0.5 % (ref 0–0.5)
LYMPHOCYTES # BLD AUTO: 0.8 K/UL (ref 1–4.8)
LYMPHOCYTES NFR BLD: 8.1 % (ref 18–48)
MCH RBC QN AUTO: 30.8 PG (ref 27–31)
MCHC RBC AUTO-ENTMCNC: 32.9 G/DL (ref 32–36)
MCV RBC AUTO: 94 FL (ref 82–98)
MONOCYTES # BLD AUTO: 0.9 K/UL (ref 0.3–1)
MONOCYTES NFR BLD: 8.3 % (ref 4–15)
NEUTROPHILS # BLD AUTO: 8.6 K/UL (ref 1.8–7.7)
NEUTROPHILS NFR BLD: 82.7 % (ref 38–73)
NRBC BLD-RTO: 0 /100 WBC
PLATELET # BLD AUTO: 138 K/UL (ref 150–450)
PMV BLD AUTO: 10.1 FL (ref 9.2–12.9)
POCT GLUCOSE: 138 MG/DL (ref 70–110)
POCT GLUCOSE: 170 MG/DL (ref 70–110)
POCT GLUCOSE: 171 MG/DL (ref 70–110)
POCT GLUCOSE: 187 MG/DL (ref 70–110)
POCT GLUCOSE: 189 MG/DL (ref 70–110)
POCT GLUCOSE: 191 MG/DL (ref 70–110)
POCT GLUCOSE: 192 MG/DL (ref 70–110)
POCT GLUCOSE: 195 MG/DL (ref 70–110)
POCT GLUCOSE: 200 MG/DL (ref 70–110)
POCT GLUCOSE: 206 MG/DL (ref 70–110)
POCT GLUCOSE: 206 MG/DL (ref 70–110)
POCT GLUCOSE: 213 MG/DL (ref 70–110)
POCT GLUCOSE: 223 MG/DL (ref 70–110)
POCT GLUCOSE: 95 MG/DL (ref 70–110)
POTASSIUM SERPL-SCNC: 4.3 MMOL/L (ref 3.5–5.1)
POTASSIUM SERPL-SCNC: 4.3 MMOL/L (ref 3.5–5.1)
POTASSIUM SERPL-SCNC: 4.4 MMOL/L (ref 3.5–5.1)
POTASSIUM SERPL-SCNC: 4.4 MMOL/L (ref 3.5–5.1)
POTASSIUM SERPL-SCNC: 4.8 MMOL/L (ref 3.5–5.1)
RBC # BLD AUTO: 2.63 M/UL (ref 4.6–6.2)
SODIUM SERPL-SCNC: 136 MMOL/L (ref 136–145)
SODIUM SERPL-SCNC: 137 MMOL/L (ref 136–145)
SODIUM SERPL-SCNC: 137 MMOL/L (ref 136–145)
SODIUM SERPL-SCNC: 138 MMOL/L (ref 136–145)
SODIUM SERPL-SCNC: 141 MMOL/L (ref 136–145)
WBC # BLD AUTO: 10.43 K/UL (ref 3.9–12.7)

## 2024-10-05 PROCEDURE — 97162 PT EVAL MOD COMPLEX 30 MIN: CPT

## 2024-10-05 PROCEDURE — 80048 BASIC METABOLIC PNL TOTAL CA: CPT

## 2024-10-05 PROCEDURE — 99232 SBSQ HOSP IP/OBS MODERATE 35: CPT | Mod: ,,, | Performed by: NURSE PRACTITIONER

## 2024-10-05 PROCEDURE — 25000003 PHARM REV CODE 250: Performed by: STUDENT IN AN ORGANIZED HEALTH CARE EDUCATION/TRAINING PROGRAM

## 2024-10-05 PROCEDURE — 20600001 HC STEP DOWN PRIVATE ROOM

## 2024-10-05 PROCEDURE — 36415 COLL VENOUS BLD VENIPUNCTURE: CPT | Mod: XB

## 2024-10-05 PROCEDURE — 97530 THERAPEUTIC ACTIVITIES: CPT

## 2024-10-05 PROCEDURE — 25000003 PHARM REV CODE 250

## 2024-10-05 PROCEDURE — 85025 COMPLETE CBC W/AUTO DIFF WBC: CPT | Performed by: STUDENT IN AN ORGANIZED HEALTH CARE EDUCATION/TRAINING PROGRAM

## 2024-10-05 PROCEDURE — 80048 BASIC METABOLIC PNL TOTAL CA: CPT | Mod: 91

## 2024-10-05 PROCEDURE — 63600175 PHARM REV CODE 636 W HCPCS: Performed by: NURSE PRACTITIONER

## 2024-10-05 RX ORDER — INSULIN ASPART 100 [IU]/ML
4-8 INJECTION, SOLUTION INTRAVENOUS; SUBCUTANEOUS
Status: DISCONTINUED | OUTPATIENT
Start: 2024-10-05 | End: 2024-10-08

## 2024-10-05 RX ORDER — IBUPROFEN 200 MG
16 TABLET ORAL
Status: DISCONTINUED | OUTPATIENT
Start: 2024-10-05 | End: 2024-10-08 | Stop reason: HOSPADM

## 2024-10-05 RX ORDER — INSULIN ASPART 100 [IU]/ML
0-10 INJECTION, SOLUTION INTRAVENOUS; SUBCUTANEOUS EVERY 4 HOURS PRN
Status: DISCONTINUED | OUTPATIENT
Start: 2024-10-05 | End: 2024-10-08

## 2024-10-05 RX ORDER — GLUCAGON 1 MG
1 KIT INJECTION
Status: DISCONTINUED | OUTPATIENT
Start: 2024-10-05 | End: 2024-10-08 | Stop reason: HOSPADM

## 2024-10-05 RX ORDER — IBUPROFEN 200 MG
24 TABLET ORAL
Status: DISCONTINUED | OUTPATIENT
Start: 2024-10-05 | End: 2024-10-08 | Stop reason: HOSPADM

## 2024-10-05 RX ADMIN — ACETAMINOPHEN 1000 MG: 500 TABLET ORAL at 10:10

## 2024-10-05 RX ADMIN — TAMSULOSIN HYDROCHLORIDE 0.4 MG: 0.4 CAPSULE ORAL at 08:10

## 2024-10-05 RX ADMIN — TRAMADOL HYDROCHLORIDE 50 MG: 50 TABLET, COATED ORAL at 08:10

## 2024-10-05 RX ADMIN — ALVIMOPAN 12 MG: 12 CAPSULE ORAL at 08:10

## 2024-10-05 RX ADMIN — INSULIN ASPART 2 UNITS: 100 INJECTION, SOLUTION INTRAVENOUS; SUBCUTANEOUS at 01:10

## 2024-10-05 RX ADMIN — SODIUM CHLORIDE: 9 INJECTION, SOLUTION INTRAVENOUS at 07:10

## 2024-10-05 RX ADMIN — GABAPENTIN 300 MG: 300 CAPSULE ORAL at 10:10

## 2024-10-05 RX ADMIN — GABAPENTIN 300 MG: 300 CAPSULE ORAL at 08:10

## 2024-10-05 RX ADMIN — GABAPENTIN 300 MG: 300 CAPSULE ORAL at 02:10

## 2024-10-05 RX ADMIN — ALVIMOPAN 12 MG: 12 CAPSULE ORAL at 10:10

## 2024-10-05 RX ADMIN — TRAMADOL HYDROCHLORIDE 50 MG: 50 TABLET, COATED ORAL at 07:10

## 2024-10-05 RX ADMIN — INSULIN ASPART 2 UNITS: 100 INJECTION, SOLUTION INTRAVENOUS; SUBCUTANEOUS at 11:10

## 2024-10-05 RX ADMIN — ACETAMINOPHEN 1000 MG: 500 TABLET ORAL at 02:10

## 2024-10-05 RX ADMIN — PANTOPRAZOLE SODIUM 40 MG: 40 TABLET, DELAYED RELEASE ORAL at 07:10

## 2024-10-05 RX ADMIN — INSULIN ASPART 4 UNITS: 100 INJECTION, SOLUTION INTRAVENOUS; SUBCUTANEOUS at 06:10

## 2024-10-05 RX ADMIN — INSULIN ASPART 2 UNITS: 100 INJECTION, SOLUTION INTRAVENOUS; SUBCUTANEOUS at 08:10

## 2024-10-05 RX ADMIN — ACETAMINOPHEN 1000 MG: 500 TABLET ORAL at 07:10

## 2024-10-05 RX ADMIN — OXYCODONE HYDROCHLORIDE 10 MG: 10 TABLET ORAL at 04:10

## 2024-10-05 NOTE — ASSESSMENT & PLAN NOTE
68yM hx of locally recurrent high rectal cancer at the anastomosis and synchronous descending colon mass 40cm from anal verge now s/p ex-lap LAR with transverse colon resection, SBR, and end colostomy creation 10/4/24 with Dr. Hong.     - Off PCA, continue MMPC  - Advance diet to low residue   - velasquez removed today  - Endocrine following glucose, appreciate recs  -mIVF, once insulin drip is off will discontinue fluids.  - GI ppx  - PT/OT   - IS  - holding DVT ppx, will consider restarting tomorrow

## 2024-10-05 NOTE — SUBJECTIVE & OBJECTIVE
"Interval HPI:   Overnight events: No acute events overnight. Patient in room 1043/1043 A. Blood glucose stable. BG at and above goal on current insulin regimen (IIP). Steroid use- None. 2 Days Post-Op  Renal function- Normal   Vasopressors-  None       Endocrine will continue to follow and manage insulin orders inpatient.         Diet Low Fiber/Residue     Eatin%  Nausea: No  Hypoglycemia and intervention: No  Fever: No  TPN and/or TF: No    BP (!) 115/59 (BP Location: Left arm, Patient Position: Lying)   Pulse 95   Temp 98.5 °F (36.9 °C) (Oral)   Resp 19   Ht 6' (1.829 m)   Wt 109.5 kg (241 lb 6.5 oz)   SpO2 95%   BMI 32.74 kg/m²     Labs Reviewed and Include    Recent Labs   Lab 10/05/24  0804   *   CALCIUM 7.6*      K 4.4   CO2 20*   *   BUN 24*   CREATININE 0.9     Lab Results   Component Value Date    WBC 17.03 (H) 10/04/2024    HGB 10.5 (L) 10/04/2024    HCT 30.3 (L) 10/04/2024    MCV 90 10/04/2024     10/04/2024     No results for input(s): "TSH", "FREET4" in the last 168 hours.  Lab Results   Component Value Date    HGBA1C 8.3 (H) 2024       Nutritional status:   Body mass index is 32.74 kg/m².  Lab Results   Component Value Date    ALBUMIN 2.6 (L) 10/03/2024    ALBUMIN 4.3 2024    ALBUMIN 4.3 2023     Lab Results   Component Value Date    PREALBUMIN 23 2023       Estimated Creatinine Clearance: 97.7 mL/min (based on SCr of 0.9 mg/dL).    Accu-Checks  Recent Labs     10/04/24  2356 10/05/24  0017 10/05/24  0126 10/05/24  0232 10/05/24  0334 10/05/24  0440 10/05/24  0550 10/05/24  0657 10/05/24  0809 10/05/24  0915   POCTGLUCOSE 97 95 138* 170* 206* 187* 206* 195* 213* 200*       Current Medications and/or Treatments Impacting Glycemic Control  Immunotherapy:    Immunosuppressants       None          Steroids:   Hormones (From admission, onward)      None          Pressors:    Autonomic Drugs (From admission, onward)      None      "     Hyperglycemia/Diabetes Medications:   Antihyperglycemics (From admission, onward)      Start     Stop Route Frequency Ordered    10/05/24 1130  insulin aspart U-100 pen 4-8 Units         -- SubQ 3 times daily with meals 10/05/24 0941    10/05/24 1045  insulin regular in 0.9 % NaCl 100 unit/100 mL (1 unit/mL) infusion        Question:  Enter initial dose (Units/hr):  Answer:  1.5    -- IV Continuous 10/05/24 0941    10/05/24 1040  insulin aspart U-100 pen 0-10 Units         -- SubQ Every 4 hours PRN 10/05/24 0941

## 2024-10-05 NOTE — PROGRESS NOTES
[facility] - [facility]  Endocrinology  Progress Note    Admit Date: 10/3/2024     Reason for Consult: Management of T2DM, Hyperglycemia     Surgical Procedure and Date: 10/03/2024 - RESECTION, RECTUM, LOW ANTERIOR    Diabetes diagnosis year: > 10 years ago    Home Diabetes Medications:    Levimir 21 units nightly  Lispro 25 units nightly (Confirmed with patient this is his current regimen.  Patient reports decreased appetite.  Previously prescribed per Denise De Leon NP Levemir 25units bid and Humalog 22units with meals.)    How often checking glucose at home? One   BG readings on regimen: 90s in AM hours  Hypoglycemia on the regimen?  No  Missed doses on regimen?  No    Diabetes Complications include:     Diabetic peripheral neuropathy     Complicating diabetes co morbidities:   Active Cancer and Glucocorticoid use       HPI:   Patient is a 68 y.o. male with a diagnosis of with history of recurrent upper rectal cancer - anastomotic and new primary at 40 cm from anal verge who underwent boost CXRT with good response on flexible sigmoidoscopy of anastomotic recurrence to boost XRT.  POD 1 from ex lap, LAR, SBR, and end colostomy creation. Endocrinology consulted for BG management          Interval HPI:   Overnight events: No acute events overnight. Patient in room 1043/1043 A. Blood glucose stable. BG at and above goal on current insulin regimen (IIP). Steroid use- None. 2 Days Post-Op  Renal function- Normal   Vasopressors-  None       Endocrine will continue to follow and manage insulin orders inpatient.         Diet Low Fiber/Residue     Eatin%  Nausea: No  Hypoglycemia and intervention: No  Fever: No  TPN and/or TF: No    BP (!) 115/59 (BP Location: Left arm, Patient Position: Lying)   Pulse 95   Temp 98.5 °F (36.9 °C) (Oral)   Resp 19   Ht 6' (1.829 m)   Wt 109.5 kg (241 lb 6.5 oz)   SpO2 95%   BMI 32.74 kg/m²     Labs Reviewed and Include    Recent Labs   Lab 10/05/24  0804   *   CALCIUM 7.6*   NA  "137   K 4.4   CO2 20*   *   BUN 24*   CREATININE 0.9     Lab Results   Component Value Date    WBC 17.03 (H) 10/04/2024    HGB 10.5 (L) 10/04/2024    HCT 30.3 (L) 10/04/2024    MCV 90 10/04/2024     10/04/2024     No results for input(s): "TSH", "FREET4" in the last 168 hours.  Lab Results   Component Value Date    HGBA1C 8.3 (H) 08/28/2024       Nutritional status:   Body mass index is 32.74 kg/m².  Lab Results   Component Value Date    ALBUMIN 2.6 (L) 10/03/2024    ALBUMIN 4.3 08/28/2024    ALBUMIN 4.3 06/20/2023     Lab Results   Component Value Date    PREALBUMIN 23 04/26/2023       Estimated Creatinine Clearance: 97.7 mL/min (based on SCr of 0.9 mg/dL).    Accu-Checks  Recent Labs     10/04/24  2356 10/05/24  0017 10/05/24  0126 10/05/24  0232 10/05/24  0334 10/05/24  0440 10/05/24  0550 10/05/24  0657 10/05/24  0809 10/05/24  0915   POCTGLUCOSE 97 95 138* 170* 206* 187* 206* 195* 213* 200*       Current Medications and/or Treatments Impacting Glycemic Control  Immunotherapy:    Immunosuppressants       None          Steroids:   Hormones (From admission, onward)      None          Pressors:    Autonomic Drugs (From admission, onward)      None          Hyperglycemia/Diabetes Medications:   Antihyperglycemics (From admission, onward)      Start     Stop Route Frequency Ordered    10/05/24 1130  insulin aspart U-100 pen 4-8 Units         -- SubQ 3 times daily with meals 10/05/24 0941    10/05/24 1045  insulin regular in 0.9 % NaCl 100 unit/100 mL (1 unit/mL) infusion        Question:  Enter initial dose (Units/hr):  Answer:  1.5    -- IV Continuous 10/05/24 0941    10/05/24 1040  insulin aspart U-100 pen 0-10 Units         -- SubQ Every 4 hours PRN 10/05/24 0941            ASSESSMENT and PLAN    Oncology  * Rectal cancer  Managed by Primary Care team.      Endocrine  Steroid-induced hyperglycemia    Patient perioperative steroids resulting in blood glucose excursions.       Uncontrolled type 2 " diabetes mellitus with hyperglycemia  Endocrinology consulted for BG management.   BG goal 140-180     - D/C IIP  - Transition drip at 1.5 units/hr with step-down parameters. (Based on insulin requirements while on IIP)   - Novolog (aspart) insulin 4-8 Units SQ TIDWM and prn for BG excursions MDC SSI (150/25). WBD 0.45 units/kg/day (patient home insulin dosages not sufficent to inform clinical decision making inpatient. Range added due to possible fluctuations in appetite).   - BG checks Q4hr  - Hypoglycemia protocol in place    ** Please notify Endocrine for any change and/or advance in diet**  ** Please call Endocrine for any BG related issues **    Discharge Planning:   TBD. Please notify endocrinology prior to discharge.  .             Edgar Berger, DNP, FNP  Endocrinology  [facility]

## 2024-10-05 NOTE — ASSESSMENT & PLAN NOTE
Endocrinology consulted for BG management.   BG goal 140-180     - D/C IIP  - Transition drip at 1.5 units/hr with step-down parameters. (Based on insulin requirements while on IIP)   - Novolog (aspart) insulin 4-8 Units SQ TIDWM and prn for BG excursions MDC SSI (150/25). WBD 0.45 units/kg/day (patient home insulin dosages not sufficent to inform clinical decision making inpatient. Range added due to possible fluctuations in appetite).   - BG checks Q4hr  - Hypoglycemia protocol in place    ** Please notify Endocrine for any change and/or advance in diet**  ** Please call Endocrine for any BG related issues **    Discharge Planning:   TBD. Please notify endocrinology prior to discharge.  .

## 2024-10-05 NOTE — PROGRESS NOTES
[facility]  Colorectal Surgery  Progress Note    Patient Name: KIRSTY Mason  MRN: [id number]  Admission Date: 10/3/2024  Hospital Length of Stay: 2 days  Attending Physician: MEGHAN Hong MD    Subjective:     Interval History: NAEON, AF/HDS, s/p ex-lap, LAR, SBR, and end colostomy creation on 10/3/2024. PCA off and pain is well controlled with oral analgesia, velasquez removed today. Insulin drip at 1.65U/hr.     Post-Op Info:  Procedure(s) (LRB):  RESECTION, RECTUM, LOW ANTERIOR (N/A)  CYSTOSCOPY, WITH URETERAL STENT INSERTION (Bilateral)  APPENDECTOMY  LYSIS, ADHESIONS  LAPAROTOMY, EXPLORATORY  MOBILIZATION, SPLENIC FLEXURE  MOBILIZATION, HEPATIC FLEXURE  COLECTOMY, TRANSVERSE (Left)  COLONOSCOPY (N/A)  REPAIR, HERNIA, VENTRAL  SIGMOIDOSCOPY, FLEXIBLE  EXCISION, SMALL INTESTINE  WRAP-OMENTAL  APPENDECTOMY   2 Days Post-Op      Medications:  Continuous Infusions:   0.9% NaCl   Intravenous Continuous 75 mL/hr at 10/05/24 0710 New Bag at 10/05/24 0710    insulin regular 1 units/mL infusion orderable (DKA)  0-52 Units/hr Intravenous Continuous 1.5 mL/hr at 10/05/24 0814 1.45 Units/hr at 10/05/24 0814     Scheduled Meds:   acetaminophen  1,000 mg Oral Q8H    alvimopan  12 mg Oral BID    gabapentin  300 mg Oral TID    pantoprazole  40 mg Oral Before breakfast    tamsulosin  0.4 mg Oral Daily     PRN Meds:   acetaminophen tablet 1,000 mg    alvimopan capsule 12 mg    gabapentin capsule 300 mg    pantoprazole EC tablet 40 mg    tamsulosin 24 hr capsule 0.4 mg        Objective:     Vital Signs (Most Recent):  Temp: 98.5 °F (36.9 °C) (10/05/24 0820)  Pulse: 95 (10/05/24 0820)  Resp: 19 (10/05/24 0820)  BP: (!) 115/59 (10/05/24 0820)  SpO2: 95 % (10/05/24 0820) Vital Signs (24h Range):  Temp:  [97.9 °F (36.6 °C)-98.5 °F (36.9 °C)] 98.5 °F (36.9 °C)  Pulse:  [] 95  Resp:  [16-20] 19  SpO2:  [92 %-98 %] 95 %  BP: ()/(50-59) 115/59     Intake/Output - Last 3 Shifts         10/03 0700  10/04 0659 10/04 0700  10/05  0659 10/05 0700  10/06 0659    P.O.  90     Blood 700      IV Piggyback 5439.2      Total Intake(mL/kg) 6139.2 (56.1) 90 (0.8)     Urine (mL/kg/hr) 925 (0.4) 975 (0.4) 705 (2.9)    Stool  0     Blood 1000      Total Output 1925 975 705    Net +4214.2 -885 -705           Stool Occurrence  0 x              Physical Exam  Constitutional:       Appearance: Normal appearance.   HENT:      Head: Normocephalic and atraumatic.   Eyes:      Conjunctiva/sclera: Conjunctivae normal.   Cardiovascular:      Rate and Rhythm: Normal rate.   Pulmonary:      Effort: Pulmonary effort is normal.   Abdominal:      Comments: Mildly distended by soft. Midline incision c,d,I. Colostomy healthy and pink. Gas and bowel sweat in bag   Genitourinary:     Comments: velasquez  Skin:     General: Skin is warm and dry.   Neurological:      General: No focal deficit present.      Mental Status: He is alert.   Psychiatric:         Mood and Affect: Mood normal.              Significant Labs:  CBC (Last 3 Results):   Recent Labs   Lab 10/03/24  1458 10/03/24  1600 10/04/24  0532   WBC  --  10.77 17.03*   RBC  --  3.32* 3.36*   HGB  --  10.4* 10.5*   HCT 26* 29.9* 30.3*   PLT  --  171 203   MCV  --  90 90   MCH  --  31.3* 31.3*   MCHC  --  34.8 34.7     CMP (Last 3 Results):   Recent Labs   Lab 10/03/24  1600 10/04/24  0532 10/04/24  2150 10/05/24  0021 10/05/24  0804   *   < > 85 99 182*   CALCIUM 6.8*   < > 7.6* 7.7* 7.6*   ALBUMIN 2.6*  --   --   --   --    PROT 4.1*  --   --   --   --       < > 138 137 137   K 4.6   < > 4.8 4.4 4.4   CO2 21*   < > 18* 18* 20*   *   < > 113* 113* 111*   BUN 15   < > 29* 27* 24*   CREATININE 0.7   < > 1.2 1.0 0.9   ALKPHOS 76  --   --   --   --    ALT 14  --   --   --   --    AST 20  --   --   --   --    BILITOT 1.9*  --   --   --   --     < > = values in this interval not displayed.       Significant Diagnostics:  I have reviewed all pertinent imaging results/findings within the past 24  hours.  Assessment/Plan:     * Rectal cancer  68yM hx of locally recurrent high rectal cancer at the anastomosis and synchronous descending colon mass 40cm from anal verge now s/p ex-lap LAR with transverse colon resection, SBR, and end colostomy creation 10/4/24 with Dr. Hong.     - Off PCA, continue MMPC  - Advance diet to low residue   - velasquez removed today  - Endocrine following glucose, appreciate recs  -mIVF, once insulin drip is off will discontinue fluids.  - GI ppx  - PT/OT   - IS  - holding DVT ppx, will consider restarting tomorrow             Caden Lawson MD  Colorectal Surgery  [facility]

## 2024-10-05 NOTE — SUBJECTIVE & OBJECTIVE
Subjective:     Interval History: NAEON, AF/HDS, s/p ex-lap, LAR, SBR, and end colostomy creation on 10/3/2024. PCA off and pain is well controlled with oral analgesia, velasquez removed today. Insulin drip at 1.65U/hr.     Post-Op Info:  Procedure(s) (LRB):  RESECTION, RECTUM, LOW ANTERIOR (N/A)  CYSTOSCOPY, WITH URETERAL STENT INSERTION (Bilateral)  APPENDECTOMY  LYSIS, ADHESIONS  LAPAROTOMY, EXPLORATORY  MOBILIZATION, SPLENIC FLEXURE  MOBILIZATION, HEPATIC FLEXURE  COLECTOMY, TRANSVERSE (Left)  COLONOSCOPY (N/A)  REPAIR, HERNIA, VENTRAL  SIGMOIDOSCOPY, FLEXIBLE  EXCISION, SMALL INTESTINE  WRAP-OMENTAL  APPENDECTOMY   2 Days Post-Op      Medications:  Continuous Infusions:   0.9% NaCl   Intravenous Continuous 75 mL/hr at 10/05/24 0710 New Bag at 10/05/24 0710    insulin regular 1 units/mL infusion orderable (DKA)  0-52 Units/hr Intravenous Continuous 1.5 mL/hr at 10/05/24 0814 1.45 Units/hr at 10/05/24 0814     Scheduled Meds:   acetaminophen  1,000 mg Oral Q8H    alvimopan  12 mg Oral BID    gabapentin  300 mg Oral TID    pantoprazole  40 mg Oral Before breakfast    tamsulosin  0.4 mg Oral Daily     PRN Meds:   acetaminophen tablet 1,000 mg    alvimopan capsule 12 mg    gabapentin capsule 300 mg    pantoprazole EC tablet 40 mg    tamsulosin 24 hr capsule 0.4 mg        Objective:     Vital Signs (Most Recent):  Temp: 98.5 °F (36.9 °C) (10/05/24 0820)  Pulse: 95 (10/05/24 0820)  Resp: 19 (10/05/24 0820)  BP: (!) 115/59 (10/05/24 0820)  SpO2: 95 % (10/05/24 0820) Vital Signs (24h Range):  Temp:  [97.9 °F (36.6 °C)-98.5 °F (36.9 °C)] 98.5 °F (36.9 °C)  Pulse:  [] 95  Resp:  [16-20] 19  SpO2:  [92 %-98 %] 95 %  BP: ()/(50-59) 115/59     Intake/Output - Last 3 Shifts         10/03 0700  10/04 0659 10/04 0700  10/05 0659 10/05 0700  10/06 0659    P.O.  90     Blood 700      IV Piggyback 5439.2      Total Intake(mL/kg) 6139.2 (56.1) 90 (0.8)     Urine (mL/kg/hr) 925 (0.4) 975 (0.4) 705 (2.9)    Stool  0      Blood 1000      Total Output 2535 975 705    Net +4214.2 -885 -705           Stool Occurrence  0 x              Physical Exam  Constitutional:       Appearance: Normal appearance.   HENT:      Head: Normocephalic and atraumatic.   Eyes:      Conjunctiva/sclera: Conjunctivae normal.   Cardiovascular:      Rate and Rhythm: Normal rate.   Pulmonary:      Effort: Pulmonary effort is normal.   Abdominal:      Comments: Mildly distended by soft. Midline incision c,d,I. Colostomy healthy and pink. Gas and bowel sweat in bag   Genitourinary:     Comments: velasquez  Skin:     General: Skin is warm and dry.   Neurological:      General: No focal deficit present.      Mental Status: He is alert.   Psychiatric:         Mood and Affect: Mood normal.              Significant Labs:  CBC (Last 3 Results):   Recent Labs   Lab 10/03/24  1458 10/03/24  1600 10/04/24  0532   WBC  --  10.77 17.03*   RBC  --  3.32* 3.36*   HGB  --  10.4* 10.5*   HCT 26* 29.9* 30.3*   PLT  --  171 203   MCV  --  90 90   MCH  --  31.3* 31.3*   MCHC  --  34.8 34.7     CMP (Last 3 Results):   Recent Labs   Lab 10/03/24  1600 10/04/24  0532 10/04/24  2150 10/05/24  0021 10/05/24  0804   *   < > 85 99 182*   CALCIUM 6.8*   < > 7.6* 7.7* 7.6*   ALBUMIN 2.6*  --   --   --   --    PROT 4.1*  --   --   --   --       < > 138 137 137   K 4.6   < > 4.8 4.4 4.4   CO2 21*   < > 18* 18* 20*   *   < > 113* 113* 111*   BUN 15   < > 29* 27* 24*   CREATININE 0.7   < > 1.2 1.0 0.9   ALKPHOS 76  --   --   --   --    ALT 14  --   --   --   --    AST 20  --   --   --   --    BILITOT 1.9*  --   --   --   --     < > = values in this interval not displayed.       Significant Diagnostics:  I have reviewed all pertinent imaging results/findings within the past 24 hours.
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
## 2024-10-05 NOTE — PLAN OF CARE
Problem: Physical Therapy  Goal: Physical Therapy Goal  Description: Goals to be met by: 10/20     Patient will increase functional independence with mobility by performin. Supine to sit with Stand-by Assistance  2. Sit to supine with Stand-by Assistance  3. Sit to stand transfer with Stand-by Assistance  4. Gait  x 200 feet with Stand-by Assistance using LRAD.     DME Justification  Patient has a mobility limitation that significantly impairs their ability to participate in one or more mobility related activities of daily living in customary locations in the home. The mobility limitation cannot be sufficiently resolved by the use of a cane or walker. The use of a manual wheelchair will greatly improve the patient's ability to participate in MRADLs. The patient will use the wheelchair on a regular basis at home. They have expressed their willingness to use a manual wheelchair in the home, and have a caregiver who is available and willing to assist with the wheelchair if needed.  10/5/2024 1522 by Cheryl Vargas, PT  Outcome: Progressing  10/5/2024 1521 by Cheryl Vargas, PT  Outcome: Progressing   Evaluation completed, initiated plan of care.   Cheryl Vargas, PT  10/5/2024

## 2024-10-05 NOTE — PROGRESS NOTES
"Attempted to see pt for follow-up ostomy care, pt sleeping, gently woken, said "not now." Supplies noted at bedside.     Will follow up at a later time.  "

## 2024-10-05 NOTE — NURSING
Phlebotomist advised specimen clotted during collection and will send someone else to attempt. Team notified

## 2024-10-06 LAB
ANION GAP SERPL CALC-SCNC: 10 MMOL/L (ref 8–16)
ANION GAP SERPL CALC-SCNC: 5 MMOL/L (ref 8–16)
ANION GAP SERPL CALC-SCNC: 6 MMOL/L (ref 8–16)
ANION GAP SERPL CALC-SCNC: 7 MMOL/L (ref 8–16)
ANION GAP SERPL CALC-SCNC: 7 MMOL/L (ref 8–16)
ANION GAP SERPL CALC-SCNC: 9 MMOL/L (ref 8–16)
BASOPHILS # BLD AUTO: 0.01 K/UL (ref 0–0.2)
BASOPHILS NFR BLD: 0.1 % (ref 0–1.9)
BUN SERPL-MCNC: 17 MG/DL (ref 8–23)
BUN SERPL-MCNC: 18 MG/DL (ref 8–23)
BUN SERPL-MCNC: 21 MG/DL (ref 8–23)
CALCIUM SERPL-MCNC: 7.8 MG/DL (ref 8.7–10.5)
CALCIUM SERPL-MCNC: 7.9 MG/DL (ref 8.7–10.5)
CALCIUM SERPL-MCNC: 8.2 MG/DL (ref 8.7–10.5)
CALCIUM SERPL-MCNC: 8.4 MG/DL (ref 8.7–10.5)
CHLORIDE SERPL-SCNC: 108 MMOL/L (ref 95–110)
CHLORIDE SERPL-SCNC: 109 MMOL/L (ref 95–110)
CHLORIDE SERPL-SCNC: 111 MMOL/L (ref 95–110)
CO2 SERPL-SCNC: 18 MMOL/L (ref 23–29)
CO2 SERPL-SCNC: 18 MMOL/L (ref 23–29)
CO2 SERPL-SCNC: 21 MMOL/L (ref 23–29)
CO2 SERPL-SCNC: 22 MMOL/L (ref 23–29)
CREAT SERPL-MCNC: 0.8 MG/DL (ref 0.5–1.4)
CREAT SERPL-MCNC: 0.9 MG/DL (ref 0.5–1.4)
CRP SERPL-MCNC: 182.6 MG/L (ref 0–8.2)
DIFFERENTIAL METHOD BLD: ABNORMAL
EOSINOPHIL # BLD AUTO: 0.1 K/UL (ref 0–0.5)
EOSINOPHIL NFR BLD: 1.1 % (ref 0–8)
ERYTHROCYTE [DISTWIDTH] IN BLOOD BY AUTOMATED COUNT: 14.1 % (ref 11.5–14.5)
ERYTHROCYTE [DISTWIDTH] IN BLOOD BY AUTOMATED COUNT: 14.6 % (ref 11.5–14.5)
EST. GFR  (NO RACE VARIABLE): >60 ML/MIN/1.73 M^2
GLUCOSE SERPL-MCNC: 103 MG/DL (ref 70–110)
GLUCOSE SERPL-MCNC: 139 MG/DL (ref 70–110)
GLUCOSE SERPL-MCNC: 141 MG/DL (ref 70–110)
GLUCOSE SERPL-MCNC: 162 MG/DL (ref 70–110)
GLUCOSE SERPL-MCNC: 171 MG/DL (ref 70–110)
GLUCOSE SERPL-MCNC: 182 MG/DL (ref 70–110)
HCT VFR BLD AUTO: 21 % (ref 40–54)
HCT VFR BLD AUTO: 21.6 % (ref 40–54)
HGB BLD-MCNC: 6.9 G/DL (ref 14–18)
HGB BLD-MCNC: 8 G/DL (ref 14–18)
IMM GRANULOCYTES # BLD AUTO: 0.06 K/UL (ref 0–0.04)
IMM GRANULOCYTES NFR BLD AUTO: 0.8 % (ref 0–0.5)
LYMPHOCYTES # BLD AUTO: 0.7 K/UL (ref 1–4.8)
LYMPHOCYTES NFR BLD: 9.5 % (ref 18–48)
MCH RBC QN AUTO: 30.5 PG (ref 27–31)
MCH RBC QN AUTO: 31.5 PG (ref 27–31)
MCHC RBC AUTO-ENTMCNC: 32.9 G/DL (ref 32–36)
MCHC RBC AUTO-ENTMCNC: 37 G/DL (ref 32–36)
MCV RBC AUTO: 85 FL (ref 82–98)
MCV RBC AUTO: 93 FL (ref 82–98)
MONOCYTES # BLD AUTO: 0.6 K/UL (ref 0.3–1)
MONOCYTES NFR BLD: 8 % (ref 4–15)
NEUTROPHILS # BLD AUTO: 5.8 K/UL (ref 1.8–7.7)
NEUTROPHILS NFR BLD: 80.5 % (ref 38–73)
NRBC BLD-RTO: 0 /100 WBC
PLATELET # BLD AUTO: 131 K/UL (ref 150–450)
PLATELET # BLD AUTO: 134 K/UL (ref 150–450)
PMV BLD AUTO: 10 FL (ref 9.2–12.9)
PMV BLD AUTO: 10.8 FL (ref 9.2–12.9)
POCT GLUCOSE: 112 MG/DL (ref 70–110)
POCT GLUCOSE: 125 MG/DL (ref 70–110)
POCT GLUCOSE: 131 MG/DL (ref 70–110)
POCT GLUCOSE: 141 MG/DL (ref 70–110)
POCT GLUCOSE: 177 MG/DL (ref 70–110)
POCT GLUCOSE: 199 MG/DL (ref 70–110)
POCT GLUCOSE: 207 MG/DL (ref 70–110)
POTASSIUM SERPL-SCNC: 3.8 MMOL/L (ref 3.5–5.1)
POTASSIUM SERPL-SCNC: 3.9 MMOL/L (ref 3.5–5.1)
POTASSIUM SERPL-SCNC: 4 MMOL/L (ref 3.5–5.1)
POTASSIUM SERPL-SCNC: 4 MMOL/L (ref 3.5–5.1)
POTASSIUM SERPL-SCNC: 4.7 MMOL/L (ref 3.5–5.1)
POTASSIUM SERPL-SCNC: 4.9 MMOL/L (ref 3.5–5.1)
RBC # BLD AUTO: 2.26 M/UL (ref 4.6–6.2)
RBC # BLD AUTO: 2.54 M/UL (ref 4.6–6.2)
SODIUM SERPL-SCNC: 135 MMOL/L (ref 136–145)
SODIUM SERPL-SCNC: 136 MMOL/L (ref 136–145)
SODIUM SERPL-SCNC: 136 MMOL/L (ref 136–145)
SODIUM SERPL-SCNC: 137 MMOL/L (ref 136–145)
SODIUM SERPL-SCNC: 137 MMOL/L (ref 136–145)
SODIUM SERPL-SCNC: 138 MMOL/L (ref 136–145)
WBC # BLD AUTO: 7.24 K/UL (ref 3.9–12.7)
WBC # BLD AUTO: 8.42 K/UL (ref 3.9–12.7)

## 2024-10-06 PROCEDURE — 97530 THERAPEUTIC ACTIVITIES: CPT

## 2024-10-06 PROCEDURE — 25000003 PHARM REV CODE 250: Performed by: STUDENT IN AN ORGANIZED HEALTH CARE EDUCATION/TRAINING PROGRAM

## 2024-10-06 PROCEDURE — 97165 OT EVAL LOW COMPLEX 30 MIN: CPT

## 2024-10-06 PROCEDURE — 20600001 HC STEP DOWN PRIVATE ROOM

## 2024-10-06 PROCEDURE — 85025 COMPLETE CBC W/AUTO DIFF WBC: CPT | Performed by: STUDENT IN AN ORGANIZED HEALTH CARE EDUCATION/TRAINING PROGRAM

## 2024-10-06 PROCEDURE — 36415 COLL VENOUS BLD VENIPUNCTURE: CPT

## 2024-10-06 PROCEDURE — 97535 SELF CARE MNGMENT TRAINING: CPT

## 2024-10-06 PROCEDURE — 63600175 PHARM REV CODE 636 W HCPCS: Performed by: NURSE PRACTITIONER

## 2024-10-06 PROCEDURE — 99232 SBSQ HOSP IP/OBS MODERATE 35: CPT | Mod: ,,, | Performed by: NURSE PRACTITIONER

## 2024-10-06 PROCEDURE — 63600175 PHARM REV CODE 636 W HCPCS: Performed by: STUDENT IN AN ORGANIZED HEALTH CARE EDUCATION/TRAINING PROGRAM

## 2024-10-06 PROCEDURE — 85027 COMPLETE CBC AUTOMATED: CPT | Performed by: COLON & RECTAL SURGERY

## 2024-10-06 PROCEDURE — 86140 C-REACTIVE PROTEIN: CPT | Performed by: STUDENT IN AN ORGANIZED HEALTH CARE EDUCATION/TRAINING PROGRAM

## 2024-10-06 PROCEDURE — 25000003 PHARM REV CODE 250

## 2024-10-06 PROCEDURE — 80048 BASIC METABOLIC PNL TOTAL CA: CPT

## 2024-10-06 RX ORDER — FUROSEMIDE 10 MG/ML
40 INJECTION INTRAMUSCULAR; INTRAVENOUS ONCE
Status: COMPLETED | OUTPATIENT
Start: 2024-10-06 | End: 2024-10-06

## 2024-10-06 RX ORDER — ENOXAPARIN SODIUM 100 MG/ML
40 INJECTION SUBCUTANEOUS EVERY 24 HOURS
Status: DISCONTINUED | OUTPATIENT
Start: 2024-10-06 | End: 2024-10-07

## 2024-10-06 RX ADMIN — INSULIN ASPART 2 UNITS: 100 INJECTION, SOLUTION INTRAVENOUS; SUBCUTANEOUS at 10:10

## 2024-10-06 RX ADMIN — OXYCODONE HYDROCHLORIDE 10 MG: 10 TABLET ORAL at 07:10

## 2024-10-06 RX ADMIN — INSULIN ASPART 4 UNITS: 100 INJECTION, SOLUTION INTRAVENOUS; SUBCUTANEOUS at 09:10

## 2024-10-06 RX ADMIN — ALVIMOPAN 12 MG: 12 CAPSULE ORAL at 10:10

## 2024-10-06 RX ADMIN — ACETAMINOPHEN 1000 MG: 500 TABLET ORAL at 02:10

## 2024-10-06 RX ADMIN — GABAPENTIN 300 MG: 300 CAPSULE ORAL at 09:10

## 2024-10-06 RX ADMIN — OXYCODONE HYDROCHLORIDE 10 MG: 10 TABLET ORAL at 11:10

## 2024-10-06 RX ADMIN — INSULIN ASPART 4 UNITS: 100 INJECTION, SOLUTION INTRAVENOUS; SUBCUTANEOUS at 05:10

## 2024-10-06 RX ADMIN — ALVIMOPAN 12 MG: 12 CAPSULE ORAL at 09:10

## 2024-10-06 RX ADMIN — GABAPENTIN 300 MG: 300 CAPSULE ORAL at 10:10

## 2024-10-06 RX ADMIN — ACETAMINOPHEN 1000 MG: 500 TABLET ORAL at 06:10

## 2024-10-06 RX ADMIN — GABAPENTIN 300 MG: 300 CAPSULE ORAL at 02:10

## 2024-10-06 RX ADMIN — ACETAMINOPHEN 1000 MG: 500 TABLET ORAL at 10:10

## 2024-10-06 RX ADMIN — FUROSEMIDE 40 MG: 10 INJECTION, SOLUTION INTRAVENOUS at 09:10

## 2024-10-06 RX ADMIN — PANTOPRAZOLE SODIUM 40 MG: 40 TABLET, DELAYED RELEASE ORAL at 06:10

## 2024-10-06 RX ADMIN — TAMSULOSIN HYDROCHLORIDE 0.4 MG: 0.4 CAPSULE ORAL at 09:10

## 2024-10-06 RX ADMIN — INSULIN ASPART 4 UNITS: 100 INJECTION, SOLUTION INTRAVENOUS; SUBCUTANEOUS at 11:10

## 2024-10-06 NOTE — PLAN OF CARE
Problem: Occupational Therapy  Goal: Occupational Therapy Goal  Description: Goals to be met by: 11/06/2024     Patient will increase functional independence with ADLs by performing:    UE Dressing with [place].  LE Dressing with Modified [place].  Grooming while standing at sink with Modified [place].  Toileting from toilet with Modified [place] for hygiene and clothing management.   Supine to sit with Modified [place].  Step transfer with Modified [place]  Toilet transfer to toilet with Modified [place].    Outcome: Progressing       OT eval complete & goals established.

## 2024-10-06 NOTE — ASSESSMENT & PLAN NOTE
Endocrinology consulted for BG management.   BG goal 140-180     - Transition drip at 0.9 units/hr with step-down parameters. (Based on insulin requirements while on IIP)   - Novolog (aspart) insulin 4-8 Units SQ TIDWM and prn for BG excursions MDC SSI (150/25). WBD 0.45 units/kg/day  (Range added due to possible fluctuations in appetite).   - BG checks Q4hr  - Hypoglycemia protocol in place    ** Please notify Endocrine for any change and/or advance in diet**  ** Please call Endocrine for any BG related issues **    Discharge Planning:   TBD. Please notify endocrinology prior to discharge.  .

## 2024-10-06 NOTE — PROGRESS NOTES
[facility]  Colorectal Surgery  Progress Note    Patient Name: KIRSTY Mason  MRN: [id number]  Admission Date: 10/3/2024  Hospital Length of Stay: 3 days  Attending Physician: MEGHAN Hong MD    Subjective:     Interval History:   -NAEO  -Pain well controlled  -Blood glucoses are much improved  -Tolerating diet and having gas per ostomy   -Hgb is 6.9 from 8.1 which appears at least partially dilutional     Post-Op Info:  Procedure(s) (LRB):  RESECTION, RECTUM, LOW ANTERIOR (N/A)  CYSTOSCOPY, WITH URETERAL STENT INSERTION (Bilateral)  APPENDECTOMY  LYSIS, ADHESIONS  LAPAROTOMY, EXPLORATORY  MOBILIZATION, SPLENIC FLEXURE  MOBILIZATION, HEPATIC FLEXURE  COLECTOMY, TRANSVERSE (Left)  COLONOSCOPY (N/A)  REPAIR, HERNIA, VENTRAL  SIGMOIDOSCOPY, FLEXIBLE  EXCISION, SMALL INTESTINE  WRAP-OMENTAL  APPENDECTOMY   3 Days Post-Op      Medications:  Continuous Infusions:   insulin regular 1 units/mL infusion orderable (TRANSFER)  0.9 Units/hr Intravenous Continuous 0.9 mL/hr at 10/06/24 0917 0.9 Units/hr at 10/06/24 0917     Scheduled Meds:   acetaminophen  1,000 mg Oral Q8H    alvimopan  12 mg Oral BID    gabapentin  300 mg Oral TID    insulin aspart U-100  4-8 Units Subcutaneous TIDWM    pantoprazole  40 mg Oral Before breakfast    tamsulosin  0.4 mg Oral Daily     PRN Meds:   acetaminophen tablet 1,000 mg    alvimopan capsule 12 mg    gabapentin capsule 300 mg    insulin aspart U-100 pen 4-8 Units    pantoprazole EC tablet 40 mg    tamsulosin 24 hr capsule 0.4 mg        Objective:     Vital Signs (Most Recent):  Temp: 98.9 °F (37.2 °C) (10/06/24 0717)  Pulse: 85 (10/06/24 0717)  Resp: 20 (10/06/24 0717)  BP: (!) 111/59 (10/06/24 0717)  SpO2: 96 % (10/06/24 0717) Vital Signs (24h Range):  Temp:  [97.7 °F (36.5 °C)-98.9 °F (37.2 °C)] 98.9 °F (37.2 °C)  Pulse:  [81-91] 85  Resp:  [15-20] 20  SpO2:  [86 %-100 %] 96 %  BP: (104-124)/(55-68) 111/59     Intake/Output - Last 3 Shifts         10/04 0700  10/05 0659 10/05 0700  10/06  0659 10/06 0700  10/07 0659    P.O. 90 950     I.V. (mL/kg)  1989.1 (18.2)     Blood       IV Piggyback       Total Intake(mL/kg) 90 (0.8) 2939.1 (26.8)     Urine (mL/kg/hr) 975 (0.4) 1430 (0.5)     Stool 0 5     Blood       Total Output 975 1435     Net -885 +1504.1            Stool Occurrence 0 x 0 x              Physical Exam  Constitutional:       Appearance: Normal appearance.   HENT:      Head: Normocephalic and atraumatic.   Eyes:      Conjunctiva/sclera: Conjunctivae normal.   Cardiovascular:      Rate and Rhythm: Normal rate.   Pulmonary:      Effort: Pulmonary effort is normal.   Abdominal:      Comments: Mildly distended by soft. Midline incision c,d,I. Colostomy healthy and pink. Gas and bowel sweat in bag   Genitourinary:     Comments: velasquez  Skin:     General: Skin is warm and dry.   Neurological:      General: No focal deficit present.      Mental Status: He is alert.   Psychiatric:         Mood and Affect: Mood normal.          Significant Labs:  BMP:   Recent Labs   Lab 10/04/24  0532 10/04/24  1318 10/06/24  0620   *   < > 103      < > 136   K 5.3*   < > 3.9      < > 108   CO2 11*   < > 21*   BUN 24*   < > 18   CREATININE 1.5*   < > 0.8   CALCIUM 8.0*   < > 7.9*   MG 1.7  --   --     < > = values in this interval not displayed.     CBC:   Recent Labs   Lab 10/06/24  0620   WBC 7.24   RBC 2.26*   HGB 6.9*   HCT 21.0*   *   MCV 93   MCH 30.5   MCHC 32.9       Significant Diagnostics:  I have reviewed all pertinent imaging results/findings within the past 24 hours.  Assessment/Plan:     * Rectal cancer  68yM hx of locally recurrent high rectal cancer at the anastomosis and synchronous descending colon mass 40cm from anal verge now s/p ex-lap LAR with transverse colon resection, SBR, and end colostomy creation 10/4/24 with Dr. Hong.     - Continue low residue diet  - Lasix today  - Recheck PM CBC  - Multimodal analgesia  - Endocrine following. Appreciate assistance   - GI  ppx  - PT/OT   - IS  - holding DVT ppxSae MD  Colorectal Surgery  [facility]

## 2024-10-06 NOTE — SUBJECTIVE & OBJECTIVE
"Interval HPI:   Overnight events: No acute events overnight. Patient in room 1043/1043 A. Blood glucose stable. BG at and above goal on current insulin regimen (Transition Insulin Drip). Steroid use- None. 3 Days Post-Op  Renal function- Normal   Vasopressors-  None       Endocrine will continue to follow and manage insulin orders inpatient.         Diet Low Fiber/Residue     Eatin%  Nausea: No  Hypoglycemia and intervention: No  Fever: No  TPN and/or TF: No    BP (!) 111/59   Pulse 85   Temp 98.9 °F (37.2 °C) (Oral)   Resp 20   Ht 6' (1.829 m)   Wt 109.5 kg (241 lb 6.5 oz)   SpO2 96%   BMI 32.74 kg/m²     Labs Reviewed and Include    Recent Labs   Lab 10/06/24  0620      CALCIUM 7.9*      K 3.9   CO2 21*      BUN 18   CREATININE 0.8     Lab Results   Component Value Date    WBC 7.24 10/06/2024    HGB 6.9 (L) 10/06/2024    HCT 21.0 (L) 10/06/2024    MCV 93 10/06/2024     (L) 10/06/2024     No results for input(s): "TSH", "FREET4" in the last 168 hours.  Lab Results   Component Value Date    HGBA1C 8.3 (H) 2024       Nutritional status:   Body mass index is 32.74 kg/m².  Lab Results   Component Value Date    ALBUMIN 2.6 (L) 10/03/2024    ALBUMIN 4.3 2024    ALBUMIN 4.3 2023     Lab Results   Component Value Date    PREALBUMIN 23 2023       Estimated Creatinine Clearance: 109.9 mL/min (based on SCr of 0.8 mg/dL).    Accu-Checks  Recent Labs     10/05/24  0657 10/05/24  0809 10/05/24  0915 10/05/24  1034 10/05/24  1137 10/05/24  1707 10/05/24  2012 10/05/24  2337 10/06/24  0039 10/06/24  0736   POCTGLUCOSE 195* 213* 200* 192* 189* 223* 191* 171* 131* 112*       Current Medications and/or Treatments Impacting Glycemic Control  Immunotherapy:    Immunosuppressants       None          Steroids:   Hormones (From admission, onward)      None          Pressors:    Autonomic Drugs (From admission, onward)      None          Hyperglycemia/Diabetes Medications: "   Antihyperglycemics (From admission, onward)      Start     Stop Route Frequency Ordered    10/05/24 1130  insulin aspart U-100 pen 4-8 Units         -- SubQ 3 times daily with meals 10/05/24 0941    10/05/24 1045  insulin regular in 0.9 % NaCl 100 unit/100 mL (1 unit/mL) infusion        Question:  Enter initial dose (Units/hr):  Answer:  0.9    -- IV Continuous 10/05/24 0941    10/05/24 1040  insulin aspart U-100 pen 0-10 Units         -- SubQ Every 4 hours PRN 10/05/24 0941

## 2024-10-06 NOTE — PROGRESS NOTES
[facility]  Endocrinology  Progress Note    Admit Date: 10/3/2024     Reason for Consult: Management of T2DM, Hyperglycemia     Surgical Procedure and Date: 10/03/2024 - RESECTION, RECTUM, LOW ANTERIOR    Diabetes diagnosis year: > 10 years ago    Home Diabetes Medications:    Levimir 21 units nightly  Lispro 25 units nightly (Confirmed with patient this is his current regimen.  Patient reports decreased appetite.  Previously prescribed per Denise De Leon NP Levemir 25units bid and Humalog 22units with meals.)    How often checking glucose at home? One   BG readings on regimen: 90s in AM hours  Hypoglycemia on the regimen?  No  Missed doses on regimen?  No    Diabetes Complications include:     Diabetic peripheral neuropathy     Complicating diabetes co morbidities:   Active Cancer and Glucocorticoid use       HPI:   Patient is a 68 y.o. male with a diagnosis of with history of recurrent upper rectal cancer - anastomotic and new primary at 40 cm from anal verge who underwent boost CXRT with good response on flexible sigmoidoscopy of anastomotic recurrence to boost XRT.  POD 1 from ex lap, LAR, SBR, and end colostomy creation. Endocrinology consulted for BG management          Interval HPI:   Overnight events: No acute events overnight. Patient in room 1043/1043 A. Blood glucose stable. BG at and above goal on current insulin regimen (Transition Insulin Drip). Steroid use- None. 3 Days Post-Op  Renal function- Normal   Vasopressors-  None       Endocrine will continue to follow and manage insulin orders inpatient.         Diet Low Fiber/Residue     Eatin%  Nausea: No  Hypoglycemia and intervention: No  Fever: No  TPN and/or TF: No    BP (!) 111/59   Pulse 85   Temp 98.9 °F (37.2 °C) (Oral)   Resp 20   Ht 6' (1.829 m)   Wt 109.5 kg (241 lb 6.5 oz)   SpO2 96%   BMI 32.74 kg/m²     Labs Reviewed and Include    Recent Labs   Lab 10/06/24  0620      CALCIUM 7.9*      K 3.9   CO2 21*     "  BUN 18   CREATININE 0.8     Lab Results   Component Value Date    WBC 7.24 10/06/2024    HGB 6.9 (L) 10/06/2024    HCT 21.0 (L) 10/06/2024    MCV 93 10/06/2024     (L) 10/06/2024     No results for input(s): "TSH", "FREET4" in the last 168 hours.  Lab Results   Component Value Date    HGBA1C 8.3 (H) 08/28/2024       Nutritional status:   Body mass index is 32.74 kg/m².  Lab Results   Component Value Date    ALBUMIN 2.6 (L) 10/03/2024    ALBUMIN 4.3 08/28/2024    ALBUMIN 4.3 06/20/2023     Lab Results   Component Value Date    PREALBUMIN 23 04/26/2023       Estimated Creatinine Clearance: 109.9 mL/min (based on SCr of 0.8 mg/dL).    Accu-Checks  Recent Labs     10/05/24  0657 10/05/24  0809 10/05/24  0915 10/05/24  1034 10/05/24  1137 10/05/24  1707 10/05/24  2012 10/05/24  2337 10/06/24  0039 10/06/24  0736   POCTGLUCOSE 195* 213* 200* 192* 189* 223* 191* 171* 131* 112*       Current Medications and/or Treatments Impacting Glycemic Control  Immunotherapy:    Immunosuppressants       None          Steroids:   Hormones (From admission, onward)      None          Pressors:    Autonomic Drugs (From admission, onward)      None          Hyperglycemia/Diabetes Medications:   Antihyperglycemics (From admission, onward)      Start     Stop Route Frequency Ordered    10/05/24 1130  insulin aspart U-100 pen 4-8 Units         -- SubQ 3 times daily with meals 10/05/24 0941    10/05/24 1045  insulin regular in 0.9 % NaCl 100 unit/100 mL (1 unit/mL) infusion        Question:  Enter initial dose (Units/hr):  Answer:  0.9    -- IV Continuous 10/05/24 0941    10/05/24 1040  insulin aspart U-100 pen 0-10 Units         -- SubQ Every 4 hours PRN 10/05/24 0941            ASSESSMENT and PLAN    Oncology  * Rectal cancer  Managed by Primary Care team.      Endocrine  Steroid-induced hyperglycemia    Patient perioperative steroids resulting in blood glucose excursions.       Uncontrolled type 2 diabetes mellitus with " hyperglycemia  Endocrinology consulted for BG management.   BG goal 140-180     - Transition drip at 0.9 units/hr with step-down parameters. (Based on insulin requirements while on IIP)   - Novolog (aspart) insulin 4-8 Units SQ TIDWM and prn for BG excursions MDC SSI (150/25). WBD 0.45 units/kg/day  (Range added due to possible fluctuations in appetite).   - BG checks Q4hr  - Hypoglycemia protocol in place    ** Please notify Endocrine for any change and/or advance in diet**  ** Please call Endocrine for any BG related issues **    Discharge Planning:   TBD. Please notify endocrinology prior to discharge.  .             Edgar Berger, DNP, FNP  Endocrinology  [facility]
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
## 2024-10-06 NOTE — ASSESSMENT & PLAN NOTE
68yM hx of locally recurrent high rectal cancer at the anastomosis and synchronous descending colon mass 40cm from anal verge now s/p ex-lap LAR with transverse colon resection, SBR, and end colostomy creation 10/4/24 with Dr. Hong.     - Continue low residue diet  - Lasix today  - Recheck PM CBC  - Multimodal analgesia  - Endocrine following. Appreciate assistance   - GI ppx  - PT/OT   - IS  - holding DVT ppx,

## 2024-10-06 NOTE — PLAN OF CARE
[facility] Plan of Care Note    Dx:   Rectal cancer [C20]    Shift Events: Insulin drip continues    Goals of Care: Continue management of insulin drip, sliding scale and blood glucose levels    Neuro: AOx4    Vital Signs: BP (!) 105/55 (BP Location: Right arm, Patient Position: Lying)   Pulse 89   Temp 98.4 °F (36.9 °C) (Oral)   Resp 16   Ht 6' (1.829 m)   Wt 109.5 kg (241 lb 6.5 oz)   SpO2 (!) 94%   BMI 32.74 kg/m²     Respiratory: 1.5 L NC    Diet: Diet Low Fiber/Residue      Is patient tolerating current diet? yes    GTTS: Insulin    Urine Output/Bowel Movement:   I/O this shift:  In: 2339.1 [P.O.:350; I.V.:1989.1]  Out: 455 [Urine:450; Stool:5]  Last Bowel Movement: 10/03/24      Drains/Tubes/Tube Feeds (include total output/shift):   I/O this shift:  In: 2339.1 [P.O.:350; I.V.:1989.1]  Out: 455 [Urine:450; Stool:5]      Lines:       Accuchecks:Q4    Skin: Surgical incisions    Fall Risk Score: 18    Activity level? Up to chair with 1 assist    Any scheduled procedures? no    Any safety concerns? Tiredness    Other: n/a

## 2024-10-06 NOTE — SUBJECTIVE & OBJECTIVE
Subjective:     Interval History:   -NAEO  -Pain well controlled  -Blood glucoses are much improved  -Tolerating diet and having gas per ostomy   -Hgb is 6.9 from 8.1 which appears at least partially dilutional     Post-Op Info:  Procedure(s) (LRB):  RESECTION, RECTUM, LOW ANTERIOR (N/A)  CYSTOSCOPY, WITH URETERAL STENT INSERTION (Bilateral)  APPENDECTOMY  LYSIS, ADHESIONS  LAPAROTOMY, EXPLORATORY  MOBILIZATION, SPLENIC FLEXURE  MOBILIZATION, HEPATIC FLEXURE  COLECTOMY, TRANSVERSE (Left)  COLONOSCOPY (N/A)  REPAIR, HERNIA, VENTRAL  SIGMOIDOSCOPY, FLEXIBLE  EXCISION, SMALL INTESTINE  WRAP-OMENTAL  APPENDECTOMY   3 Days Post-Op      Medications:  Continuous Infusions:   insulin regular 1 units/mL infusion orderable (TRANSFER)  0.9 Units/hr Intravenous Continuous 0.9 mL/hr at 10/06/24 0917 0.9 Units/hr at 10/06/24 0917     Scheduled Meds:   acetaminophen  1,000 mg Oral Q8H    alvimopan  12 mg Oral BID    gabapentin  300 mg Oral TID    insulin aspart U-100  4-8 Units Subcutaneous TIDWM    pantoprazole  40 mg Oral Before breakfast    tamsulosin  0.4 mg Oral Daily     PRN Meds:   acetaminophen tablet 1,000 mg    alvimopan capsule 12 mg    gabapentin capsule 300 mg    insulin aspart U-100 pen 4-8 Units    pantoprazole EC tablet 40 mg    tamsulosin 24 hr capsule 0.4 mg        Objective:     Vital Signs (Most Recent):  Temp: 98.9 °F (37.2 °C) (10/06/24 0717)  Pulse: 85 (10/06/24 0717)  Resp: 20 (10/06/24 0717)  BP: (!) 111/59 (10/06/24 0717)  SpO2: 96 % (10/06/24 0717) Vital Signs (24h Range):  Temp:  [97.7 °F (36.5 °C)-98.9 °F (37.2 °C)] 98.9 °F (37.2 °C)  Pulse:  [81-91] 85  Resp:  [15-20] 20  SpO2:  [86 %-100 %] 96 %  BP: (104-124)/(55-68) 111/59     Intake/Output - Last 3 Shifts         10/04 0700  10/05 0659 10/05 0700  10/06 0659 10/06 0700  10/07 0659    P.O. 90 950     I.V. (mL/kg)  1989.1 (18.2)     Blood       IV Piggyback       Total Intake(mL/kg) 90 (0.8) 2939.1 (26.8)     Urine (mL/kg/hr) 975 (0.4) 1430 (0.5)      Stool 0 5     Blood       Total Output 975 1435     Net -885 +1504.1            Stool Occurrence 0 x 0 x              Physical Exam  Constitutional:       Appearance: Normal appearance.   HENT:      Head: Normocephalic and atraumatic.   Eyes:      Conjunctiva/sclera: Conjunctivae normal.   Cardiovascular:      Rate and Rhythm: Normal rate.   Pulmonary:      Effort: Pulmonary effort is normal.   Abdominal:      Comments: Mildly distended by soft. Midline incision c,d,I. Colostomy healthy and pink. Gas and bowel sweat in bag   Genitourinary:     Comments: velasquez  Skin:     General: Skin is warm and dry.   Neurological:      General: No focal deficit present.      Mental Status: He is alert.   Psychiatric:         Mood and Affect: Mood normal.          Significant Labs:  BMP:   Recent Labs   Lab 10/04/24  0532 10/04/24  1318 10/06/24  0620   *   < > 103      < > 136   K 5.3*   < > 3.9      < > 108   CO2 11*   < > 21*   BUN 24*   < > 18   CREATININE 1.5*   < > 0.8   CALCIUM 8.0*   < > 7.9*   MG 1.7  --   --     < > = values in this interval not displayed.     CBC:   Recent Labs   Lab 10/06/24  0620   WBC 7.24   RBC 2.26*   HGB 6.9*   HCT 21.0*   *   MCV 93   MCH 30.5   MCHC 32.9       Significant Diagnostics:  I have reviewed all pertinent imaging results/findings within the past 24 hours.

## 2024-10-07 LAB
ANION GAP SERPL CALC-SCNC: 10 MMOL/L (ref 8–16)
ANION GAP SERPL CALC-SCNC: 8 MMOL/L (ref 8–16)
BASOPHILS # BLD AUTO: 0.02 K/UL (ref 0–0.2)
BASOPHILS NFR BLD: 0.3 % (ref 0–1.9)
BUN SERPL-MCNC: 16 MG/DL (ref 8–23)
BUN SERPL-MCNC: 16 MG/DL (ref 8–23)
CALCIUM SERPL-MCNC: 7.9 MG/DL (ref 8.7–10.5)
CALCIUM SERPL-MCNC: 7.9 MG/DL (ref 8.7–10.5)
CHLORIDE SERPL-SCNC: 107 MMOL/L (ref 95–110)
CHLORIDE SERPL-SCNC: 107 MMOL/L (ref 95–110)
CO2 SERPL-SCNC: 20 MMOL/L (ref 23–29)
CO2 SERPL-SCNC: 22 MMOL/L (ref 23–29)
CREAT SERPL-MCNC: 0.8 MG/DL (ref 0.5–1.4)
CREAT SERPL-MCNC: 0.8 MG/DL (ref 0.5–1.4)
CRP SERPL-MCNC: 116.7 MG/L (ref 0–8.2)
DIFFERENTIAL METHOD BLD: ABNORMAL
EOSINOPHIL # BLD AUTO: 0.1 K/UL (ref 0–0.5)
EOSINOPHIL NFR BLD: 1.8 % (ref 0–8)
ERYTHROCYTE [DISTWIDTH] IN BLOOD BY AUTOMATED COUNT: 14.1 % (ref 11.5–14.5)
EST. GFR  (NO RACE VARIABLE): >60 ML/MIN/1.73 M^2
EST. GFR  (NO RACE VARIABLE): >60 ML/MIN/1.73 M^2
GLUCOSE SERPL-MCNC: 101 MG/DL (ref 70–110)
GLUCOSE SERPL-MCNC: 92 MG/DL (ref 70–110)
HCT VFR BLD AUTO: 21 % (ref 40–54)
HGB BLD-MCNC: 7.5 G/DL (ref 14–18)
IMM GRANULOCYTES # BLD AUTO: 0.1 K/UL (ref 0–0.04)
IMM GRANULOCYTES NFR BLD AUTO: 1.4 % (ref 0–0.5)
LYMPHOCYTES # BLD AUTO: 0.9 K/UL (ref 1–4.8)
LYMPHOCYTES NFR BLD: 12.7 % (ref 18–48)
MCH RBC QN AUTO: 31.8 PG (ref 27–31)
MCHC RBC AUTO-ENTMCNC: 35.7 G/DL (ref 32–36)
MCV RBC AUTO: 89 FL (ref 82–98)
MONOCYTES # BLD AUTO: 0.6 K/UL (ref 0.3–1)
MONOCYTES NFR BLD: 7.7 % (ref 4–15)
NEUTROPHILS # BLD AUTO: 5.6 K/UL (ref 1.8–7.7)
NEUTROPHILS NFR BLD: 76.1 % (ref 38–73)
NRBC BLD-RTO: 0 /100 WBC
PLATELET # BLD AUTO: 160 K/UL (ref 150–450)
PMV BLD AUTO: 11.2 FL (ref 9.2–12.9)
POCT GLUCOSE: 100 MG/DL (ref 70–110)
POCT GLUCOSE: 103 MG/DL (ref 70–110)
POCT GLUCOSE: 142 MG/DL (ref 70–110)
POCT GLUCOSE: 152 MG/DL (ref 70–110)
POCT GLUCOSE: 178 MG/DL (ref 70–110)
POCT GLUCOSE: 226 MG/DL (ref 70–110)
POTASSIUM SERPL-SCNC: 4.1 MMOL/L (ref 3.5–5.1)
POTASSIUM SERPL-SCNC: 4.1 MMOL/L (ref 3.5–5.1)
RBC # BLD AUTO: 2.36 M/UL (ref 4.6–6.2)
SODIUM SERPL-SCNC: 135 MMOL/L (ref 136–145)
SODIUM SERPL-SCNC: 139 MMOL/L (ref 136–145)
WBC # BLD AUTO: 7.38 K/UL (ref 3.9–12.7)

## 2024-10-07 PROCEDURE — 63600175 PHARM REV CODE 636 W HCPCS

## 2024-10-07 PROCEDURE — 97535 SELF CARE MNGMENT TRAINING: CPT

## 2024-10-07 PROCEDURE — 99232 SBSQ HOSP IP/OBS MODERATE 35: CPT | Mod: ,,,

## 2024-10-07 PROCEDURE — 20600001 HC STEP DOWN PRIVATE ROOM

## 2024-10-07 PROCEDURE — 80048 BASIC METABOLIC PNL TOTAL CA: CPT | Mod: 91

## 2024-10-07 PROCEDURE — 36415 COLL VENOUS BLD VENIPUNCTURE: CPT

## 2024-10-07 PROCEDURE — 25000003 PHARM REV CODE 250

## 2024-10-07 PROCEDURE — 80048 BASIC METABOLIC PNL TOTAL CA: CPT

## 2024-10-07 PROCEDURE — 25000003 PHARM REV CODE 250: Performed by: STUDENT IN AN ORGANIZED HEALTH CARE EDUCATION/TRAINING PROGRAM

## 2024-10-07 PROCEDURE — 36415 COLL VENOUS BLD VENIPUNCTURE: CPT | Performed by: COLON & RECTAL SURGERY

## 2024-10-07 PROCEDURE — 85025 COMPLETE CBC W/AUTO DIFF WBC: CPT | Performed by: COLON & RECTAL SURGERY

## 2024-10-07 PROCEDURE — 86140 C-REACTIVE PROTEIN: CPT

## 2024-10-07 PROCEDURE — 63600175 PHARM REV CODE 636 W HCPCS: Performed by: STUDENT IN AN ORGANIZED HEALTH CARE EDUCATION/TRAINING PROGRAM

## 2024-10-07 RX ORDER — ACETAMINOPHEN 500 MG
1000 TABLET ORAL EVERY 8 HOURS
COMMUNITY
Start: 2024-10-07

## 2024-10-07 RX ORDER — GABAPENTIN 300 MG/1
300 CAPSULE ORAL 3 TIMES DAILY
Qty: 30 CAPSULE | Refills: 0 | Status: SHIPPED | OUTPATIENT
Start: 2024-10-07 | End: 2024-10-18

## 2024-10-07 RX ORDER — OXYCODONE HYDROCHLORIDE 5 MG/1
5 TABLET ORAL EVERY 6 HOURS PRN
Qty: 15 TABLET | Refills: 0 | Status: SHIPPED | OUTPATIENT
Start: 2024-10-07 | End: 2024-10-18 | Stop reason: SDUPTHER

## 2024-10-07 RX ORDER — INSULIN GLARGINE 100 [IU]/ML
13 INJECTION, SOLUTION SUBCUTANEOUS DAILY
Status: DISCONTINUED | OUTPATIENT
Start: 2024-10-07 | End: 2024-10-08 | Stop reason: HOSPADM

## 2024-10-07 RX ORDER — FUROSEMIDE 10 MG/ML
40 INJECTION INTRAMUSCULAR; INTRAVENOUS ONCE
Status: COMPLETED | OUTPATIENT
Start: 2024-10-07 | End: 2024-10-07

## 2024-10-07 RX ORDER — ENOXAPARIN SODIUM 100 MG/ML
40 INJECTION SUBCUTANEOUS EVERY 24 HOURS
Status: DISCONTINUED | OUTPATIENT
Start: 2024-10-07 | End: 2024-10-08 | Stop reason: HOSPADM

## 2024-10-07 RX ADMIN — ENOXAPARIN SODIUM 40 MG: 40 INJECTION SUBCUTANEOUS at 08:10

## 2024-10-07 RX ADMIN — TAMSULOSIN HYDROCHLORIDE 0.4 MG: 0.4 CAPSULE ORAL at 08:10

## 2024-10-07 RX ADMIN — PANTOPRAZOLE SODIUM 40 MG: 40 TABLET, DELAYED RELEASE ORAL at 06:10

## 2024-10-07 RX ADMIN — ALVIMOPAN 12 MG: 12 CAPSULE ORAL at 08:10

## 2024-10-07 RX ADMIN — FUROSEMIDE 40 MG: 10 INJECTION, SOLUTION INTRAVENOUS at 08:10

## 2024-10-07 RX ADMIN — OXYCODONE HYDROCHLORIDE 10 MG: 10 TABLET ORAL at 08:10

## 2024-10-07 RX ADMIN — INSULIN GLARGINE 13 UNITS: 100 INJECTION, SOLUTION SUBCUTANEOUS at 12:10

## 2024-10-07 RX ADMIN — INSULIN ASPART 4 UNITS: 100 INJECTION, SOLUTION INTRAVENOUS; SUBCUTANEOUS at 12:10

## 2024-10-07 RX ADMIN — GABAPENTIN 300 MG: 300 CAPSULE ORAL at 03:10

## 2024-10-07 RX ADMIN — INSULIN ASPART 2 UNITS: 100 INJECTION, SOLUTION INTRAVENOUS; SUBCUTANEOUS at 12:10

## 2024-10-07 RX ADMIN — GABAPENTIN 300 MG: 300 CAPSULE ORAL at 08:10

## 2024-10-07 RX ADMIN — INSULIN ASPART 4 UNITS: 100 INJECTION, SOLUTION INTRAVENOUS; SUBCUTANEOUS at 09:10

## 2024-10-07 RX ADMIN — ENOXAPARIN SODIUM 40 MG: 40 INJECTION SUBCUTANEOUS at 12:10

## 2024-10-07 RX ADMIN — ACETAMINOPHEN 1000 MG: 500 TABLET ORAL at 06:10

## 2024-10-07 RX ADMIN — ACETAMINOPHEN 1000 MG: 500 TABLET ORAL at 08:10

## 2024-10-07 RX ADMIN — ACETAMINOPHEN 1000 MG: 500 TABLET ORAL at 03:10

## 2024-10-07 NOTE — PROGRESS NOTES
[facility]  Endocrinology  Progress Note    Admit Date: 10/3/2024     Reason for Consult: Management of T2DM, Hyperglycemia     Surgical Procedure and Date: 10/03/2024 - RESECTION, RECTUM, LOW ANTERIOR    Diabetes diagnosis year: > 10 years ago    Home Diabetes Medications:    Levimir 21 units nightly  Lispro 25 units nightly (Confirmed with patient this is his current regimen.  Patient reports decreased appetite.  Previously prescribed per Denise De Leon NP Levemir 25units bid and Humalog 22units with meals.)    How often checking glucose at home? One   BG readings on regimen: 90s in AM hours  Hypoglycemia on the regimen?  No  Missed doses on regimen?  No    Diabetes Complications include:     Diabetic peripheral neuropathy     Complicating diabetes co morbidities:   Active Cancer and Glucocorticoid use       HPI:   Patient is a 68 y.o. male with a diagnosis of with history of recurrent upper rectal cancer - anastomotic and new primary at 40 cm from anal verge who underwent boost CXRT with good response on flexible sigmoidoscopy of anastomotic recurrence to boost XRT.  POD 1 from ex lap, LAR, SBR, and end colostomy creation. Endocrinology consulted for BG management          Interval HPI:   No acute events overnight. Patient in room 1043/1043 A. Blood glucose improving. BG at and below goal on current insulin regimen (Transition Insulin Drip). Steroid use- None. 4 Days Post-Op  Renal function- Normal   Lab Results   Component Value Date    CREATININE 0.8 10/07/2024     Vasopressors-  None     Endocrine will continue to follow and manage insulin orders inpatient.     Diet Low Fiber/Residue     Eating:   <25%  Nausea: No  Hypoglycemia and intervention: No  Fever: No  TPN and/or TF: No  If yes, type of TF/TPN and rate: N/A    /69   Pulse 85   Temp 99.1 °F (37.3 °C) (Axillary)   Resp 18   Ht 6' (1.829 m)   Wt 109.5 kg (241 lb 6.5 oz)   SpO2 (!) 91%   BMI 32.74 kg/m²     Labs Reviewed and Include   "  Recent Labs   Lab 10/07/24  0407   GLU 92   CALCIUM 7.9*      K 4.1   CO2 22*      BUN 16   CREATININE 0.8     Lab Results   Component Value Date    WBC 7.38 10/07/2024    HGB 7.5 (L) 10/07/2024    HCT 21.0 (L) 10/07/2024    MCV 89 10/07/2024     10/07/2024     No results for input(s): "TSH", "FREET4" in the last 168 hours.  Lab Results   Component Value Date    HGBA1C 8.3 (H) 08/28/2024       Nutritional status:   Body mass index is 32.74 kg/m².  Lab Results   Component Value Date    ALBUMIN 2.6 (L) 10/03/2024    ALBUMIN 4.3 08/28/2024    ALBUMIN 4.3 06/20/2023     Lab Results   Component Value Date    PREALBUMIN 23 04/26/2023       Estimated Creatinine Clearance: 109.9 mL/min (based on SCr of 0.8 mg/dL).    Accu-Checks  Recent Labs     10/05/24  2337 10/06/24  0039 10/06/24  0442 10/06/24  0736 10/06/24  1158 10/06/24  1712 10/06/24  2018 10/06/24  2357 10/07/24  0416 10/07/24  0748   POCTGLUCOSE 171* 131* 141* 112* 177* 207* 199* 125* 100 103       Current Medications and/or Treatments Impacting Glycemic Control  Immunotherapy:    Immunosuppressants       None          Steroids:   Hormones (From admission, onward)      None          Pressors:    Autonomic Drugs (From admission, onward)      None          Hyperglycemia/Diabetes Medications:   Antihyperglycemics (From admission, onward)      Start     Stop Route Frequency Ordered    10/07/24 1000  insulin glargine U-100 (Lantus) pen 13 Units         -- SubQ Daily 10/07/24 0853    10/05/24 1130  insulin aspart U-100 pen 4-8 Units         -- SubQ 3 times daily with meals 10/05/24 0941    10/05/24 1040  insulin aspart U-100 pen 0-10 Units         -- SubQ Every 4 hours PRN 10/05/24 0941            ASSESSMENT and PLAN    Oncology  * Rectal cancer  Managed by Primary Care team.      Endocrine  BMI 32.0-32.9,adult  May contribute to insulin resistance      Uncontrolled type 2 diabetes mellitus with hyperglycemia  BG goal 140-180     - Discontinue " transition drip   - Start Lantus 13 units (20% decrease from transition drip rate)  - Novolog (aspart) insulin 4-8 Units SQ TIDWM and prn for BG excursions MDC SSI (150/25). (Range added due to possible fluctuations in appetite).   - BG checks AC/HS  - Hypoglycemia protocol in place    ** Please notify Endocrine for any change and/or advance in diet**  ** Please call Endocrine for any BG related issues **    Discharge Planning:   TBD. Please notify endocrinology prior to discharge.  .            Ludivina Sanchez PA-C  Endocrinology  [facility]

## 2024-10-07 NOTE — ASSESSMENT & PLAN NOTE
BG goal 140-180     - Discontinue transition drip   - Start Lantus 13 units (20% decrease from transition drip rate)  - Novolog (aspart) insulin 4-8 Units SQ TIDWM and prn for BG excursions MDC SSI (150/25). (Range added due to possible fluctuations in appetite).   - BG checks AC/HS  - Hypoglycemia protocol in place    ** Please notify Endocrine for any change and/or advance in diet**  ** Please call Endocrine for any BG related issues **    Discharge Planning:   TBD. Please notify endocrinology prior to discharge.  .

## 2024-10-07 NOTE — OP NOTE
[facility] - Surgery ([facility])  [facility]  Operative Note    Date: 10/03/2024    Pre-Op Diagnosis: Rectal cancer       Post-Op Diagnosis: Same     Procedure(s) Performed:   1.  Cystoscopy with bilateral ureteral catheter placement    Specimen(s): None    Staff Surgeon: Gopi Rubio MD    Assistant Surgeon: Luke Trevizo DO     Anesthesia: General endotracheal anesthesia    Indications: KIRSTY Mason is a 68 y.o. male with a history of rectal cancer.  Dr. Hong has requested intra-operative ureteral catheters to allow for early intra-operative identification and repair of any injuries.      Findings:  Bilateral ureteral catheters placed without complication  Attached to 16 Fr Velasquez catheter with attachment device    Estimated Blood Loss: min    Drains:   1.  bilateral 5 Fr ureteral catheters  2.  16 Fr velasquez catheter, 10 cc in the balloon    Procedure in Detail: Upon entering the room the patient was under general anesthesia.  The patient was then placed in the dorsal lithotomy position and prepped and draped in the usual sterile fashion. Preoperative antibiotics were administered per the primary surgeon preference.  Timeout was performed.      A 22 Fr cystoscope was inserted into the urethra and formal cystourethroscopy was performed. The urethra was normal.  The right and left ureteral orifices were in the normal anatomic position.  There were no mucosal abnormalities. A motion wire was used to cannulate the right UO and advanced to the level of the renal pelvis and a 5 Fr ureteral catheter was then inserted into the right ureteral orifice and advanced up to the level of the renal pelvis. The motion wire was then removed. The cystoscope was then removed leaving the ureteral catheter in place.     The cystoscope was then reinserted alongside the ureteral catheter and a motion wire was used to cannulate the left ureteral orifice and advanced to the level of the renal pelvis. A 5 Fr ureteral catheter was  advanced into the left ureteral orifice and advanced to the level of the renal pelvis. The cystoscope was then removed, leaving the ureteral catheter in place.    A 16 Fr velasquez catheter was inserted and the balloon was filled with 10mL of sterile water. The ureteral catheters were secured to the Velasquez catheter in the standard fashion with the attachment device. There were no complications with the procedure and the patient tolerated our procedure well.     The case was then turned over to the primary surgeon.     Luke Trevizo DO

## 2024-10-07 NOTE — PROGRESS NOTES
[facility]  Colorectal Surgery  Progress Note    Patient Name: KIRSTY Mason  MRN: [id number]  Admission Date: 10/3/2024  Hospital Length of Stay: 4 days  Attending Physician: MEGHAN Hong MD    Subjective:     Interval History:   NAEO  Pain well controlled  Tolerating a diet, just not much appetite  Having ostomy function  Hgb stable on recheck yesterday    Post-Op Info:  Procedure(s) (LRB):  RESECTION, RECTUM, LOW ANTERIOR (N/A)  CYSTOSCOPY, WITH URETERAL STENT INSERTION (Bilateral)  APPENDECTOMY  LYSIS, ADHESIONS  LAPAROTOMY, EXPLORATORY  MOBILIZATION, SPLENIC FLEXURE  MOBILIZATION, HEPATIC FLEXURE  COLECTOMY, TRANSVERSE (Left)  COLONOSCOPY (N/A)  REPAIR, HERNIA, VENTRAL  SIGMOIDOSCOPY, FLEXIBLE  EXCISION, SMALL INTESTINE  WRAP-OMENTAL  APPENDECTOMY   4 Days Post-Op      Medications:  Continuous Infusions:   insulin regular 1 units/mL infusion orderable (TRANSFER)  0.9 Units/hr Intravenous Continuous 0.7 mL/hr at 10/07/24 0003 0.7 Units/hr at 10/07/24 0003     Scheduled Meds:   acetaminophen  1,000 mg Oral Q8H    alvimopan  12 mg Oral BID    gabapentin  300 mg Oral TID    insulin aspart U-100  4-8 Units Subcutaneous TIDWM    pantoprazole  40 mg Oral Before breakfast    tamsulosin  0.4 mg Oral Daily     PRN Meds:   acetaminophen tablet 1,000 mg    alvimopan capsule 12 mg    gabapentin capsule 300 mg    insulin aspart U-100 pen 4-8 Units    pantoprazole EC tablet 40 mg    tamsulosin 24 hr capsule 0.4 mg        Objective:     Vital Signs (Most Recent):  Temp: 99.1 °F (37.3 °C) (10/07/24 0747)  Pulse: 85 (10/07/24 0747)  Resp: 18 (10/07/24 0747)  BP: 119/69 (10/07/24 0747)  SpO2: (!) 91 % (10/07/24 0747) Vital Signs (24h Range):  Temp:  [97.6 °F (36.4 °C)-99.1 °F (37.3 °C)] 99.1 °F (37.3 °C)  Pulse:  [81-87] 85  Resp:  [17-20] 18  SpO2:  [91 %-97 %] 91 %  BP: (117-130)/(60-88) 119/69     Intake/Output - Last 3 Shifts         10/05 0700  10/06 0659 10/06 0700  10/07 0659 10/07 0700  10/08 0659    P.O. 950      I.V.  (mL/kg) 1989.1 (18.2)      Total Intake(mL/kg) 2939.1 (26.8)      Urine (mL/kg/hr) 1430 (0.5) 1100 (0.4)     Stool 5 140     Total Output 1435 1240     Net +1504.1 -1240            Stool Occurrence 0 x 0 x              Physical Exam  Constitutional:       Appearance: Normal appearance.   HENT:      Head: Normocephalic and atraumatic.   Eyes:      Conjunctiva/sclera: Conjunctivae normal.   Cardiovascular:      Rate and Rhythm: Normal rate.   Pulmonary:      Effort: Pulmonary effort is normal.   Abdominal:      Comments: Soft, minimal distension. Midline incision c,d,I. Colostomy healthy and pink. Gas and stool in bag   Skin:     General: Skin is warm and dry.   Neurological:      General: No focal deficit present.      Mental Status: He is alert.   Psychiatric:         Mood and Affect: Mood normal.          Significant Labs:  BMP:   Recent Labs   Lab 10/04/24  0532 10/04/24  1318 10/07/24  0407   *   < > 92      < > 139   K 5.3*   < > 4.1      < > 107   CO2 11*   < > 22*   BUN 24*   < > 16   CREATININE 1.5*   < > 0.8   CALCIUM 8.0*   < > 7.9*   MG 1.7  --   --     < > = values in this interval not displayed.     CBC:   Recent Labs   Lab 10/07/24  0556   WBC 7.38   RBC 2.36*   HGB 7.5*   HCT 21.0*      MCV 89   MCH 31.8*   MCHC 35.7     CRP (Last 3 Results):   Recent Labs   Lab 10/06/24  0620 10/07/24  0407   .6* 116.7*     All pertinent lab results within the last 24 hours have been reviewed.     Significant Diagnostics:  I have reviewed all pertinent imaging results/findings within the past 24 hours.  Assessment/Plan:     * Rectal cancer  68yM hx of locally recurrent high rectal cancer at the anastomosis and synchronous descending colon mass 40cm from anal verge now s/p ex-lap LAR with transverse colon resection, SBR, and end colostomy creation 10/4/24 with Dr. Hong.     - Continue low residue diet  - Multimodal analgesia  - Endocrine following. Appreciate assistance    -Off  insulin gtt this morning  - PT/OT   - IS  - Start DVT ppx  -Ostomy teaching  -Will obtain home health             Sae De Santiago MD  Colorectal Surgery  [facility]

## 2024-10-07 NOTE — ASSESSMENT & PLAN NOTE
68yM hx of locally recurrent high rectal cancer at the anastomosis and synchronous descending colon mass 40cm from anal verge now s/p ex-lap LAR with transverse colon resection, SBR, and end colostomy creation 10/4/24 with Dr. Hong.     - Continue low residue diet  - Multimodal analgesia  - Endocrine following. Appreciate assistance    -Off insulin gtt this morning  - PT/OT   - IS  - Start DVT ppx  -Ostomy teaching  -Will obtain home health

## 2024-10-07 NOTE — PLAN OF CARE
[facility]      HOME HEALTH ORDERS  FACE TO FACE ENCOUNTER    Patient Name: KIRSTY Mason  YOB: 1955    PCP: Jass Mcdermott II, MD   PCP Address: [address] [facility] [place] / [place] [address]  PCP Phone Number: [phone]  PCP Fax: [phone]    Encounter Date: 10/07/2024     Admit to Home Health    Diagnoses:  Active Hospital Problems    Diagnosis  POA    *Rectal cancer [C20]  Yes    Steroid-induced hyperglycemia [R73.9, T38.0X5A]  Unknown     Patient perioperative steroids resulting in blood glucose excursions.      Uncontrolled type 2 diabetes mellitus with hyperglycemia [E11.65]  Yes      Resolved Hospital Problems   No resolved problems to display.       Follow Up Appointments:  Future Appointments   Date Time Provider Department Center   10/18/2024 10:20 AM MEGHAN Hong MD [facility] [place]   10/21/2024 11:00 AM Priscilla Matos NP [facility] UROLOGY [place]   2/27/2025 11:00 AM Jass Mcdermott II, MD [facility] STPN FO [facility]       Allergies:Review of patient's allergies indicates:  No Known Allergies    Medications: Review discharge medications with patient and family and provide education.    Current Facility-Administered Medications   Medication Dose Route Frequency Provider Last Rate Last Admin    acetaminophen tablet 1,000 mg  1,000 mg Oral Q8H Chema Ruelas MD   1,000 mg at 10/07/24 0647    albuterol inhaler 2 puff  2 puff Inhalation Q6H PRN Chema Ruelas MD        alvimopan capsule 12 mg  12 mg Oral BID Chema Ruelas MD   12 mg at 10/06/24 2220    dextrose 10% bolus 125 mL 125 mL  12.5 g Intravenous PRN Chema Ruelas MD        dextrose 10% bolus 250 mL 250 mL  25 g Intravenous PRN Chema Ruelas MD        enoxaparin injection 40 mg  40 mg Subcutaneous Q24H (prophylaxis, 1700) Sae De Santiago MD        furosemide injection 40 mg  40 mg Intravenous Once Sae De Santiago MD        gabapentin capsule 300 mg  300 mg Oral TID Sae De Santiago MD   300 mg  at 10/06/24 2220    glucagon (human recombinant) injection 1 mg  1 mg Intramuscular PRN Edgar Berger DNP, FNP        glucose chewable tablet 16 g  16 g Oral PRN Edgar Berger DNP, FNP        glucose chewable tablet 24 g  24 g Oral PRN Edgar Berger DNP, FNP        HYDROmorphone injection 0.5 mg  0.5 mg Intravenous Q2H PRN Munira Camacho MD        insulin aspart U-100 pen 0-10 Units  0-10 Units Subcutaneous Q4H PRN Edgar Berger DNP, FNP   2 Units at 10/06/24 2222    insulin aspart U-100 pen 4-8 Units  4-8 Units Subcutaneous TIDWM Edgar Berger DNP, FNP   4 Units at 10/06/24 1714    insulin regular in 0.9 % NaCl 100 unit/100 mL (1 unit/mL) infusion  0.9 Units/hr Intravenous Continuous Edgar Berger DNP, FNP 0.7 mL/hr at 10/07/24 0003 0.7 Units/hr at 10/07/24 0003    naloxone 0.4 mg/mL injection 0.02 mg  0.02 mg Intravenous PRN Chema Ruelas MD        ondansetron injection 4 mg  4 mg Intravenous Q12H PRN Chema Ruelas MD   4 mg at 10/04/24 0253    oxyCODONE immediate release tablet 5 mg  5 mg Oral Q4H PRN Munira Camacho MD   5 mg at 10/04/24 1713    oxyCODONE immediate release tablet Tab 10 mg  10 mg Oral Q4H PRN Munira Camacho MD   10 mg at 10/06/24 1958    pantoprazole EC tablet 40 mg  40 mg Oral Before breakfast Chema Ruelas MD   40 mg at 10/07/24 0647    tamsulosin 24 hr capsule 0.4 mg  0.4 mg Oral Daily Munira Camacho MD   0.4 mg at 10/06/24 0940    traMADoL tablet 50 mg  50 mg Oral Q6H PRN Chema Ruelas MD   50 mg at 10/05/24 1904        Medication List        START taking these medications      acetaminophen 500 MG tablet  Commonly known as: TYLENOL  Take 2 tablets (1,000 mg total) by mouth every 8 (eight) hours.     gabapentin 300 MG capsule  Commonly known as: NEURONTIN  Take 1 capsule (300 mg total) by mouth 3 (three) times daily. for 10 days     oxyCODONE 5 MG immediate release tablet  Commonly known as: ROXICODONE  Take 1 tablet (5 mg total)  "by mouth every 6 (six) hours as needed (Pain not controlled by scheduled tylenol).            CHANGE how you take these medications      insulin lispro 100 unit/mL pen  ADMINISTER 8 UNITS UNDER THE SKIN THREE TIMES DAILY BEFORE MEALS  What changed: See the new instructions.     LEVEMIR FLEXPEN 100 unit/mL (3 mL) Inpn pen  Generic drug: insulin detemir U-100 (Levemir)  ADMINISTER 25 UNITS UNDER THE SKIN EVERY DAY  What changed: See the new instructions.            CONTINUE taking these medications      b complex vitamins tablet  Take 1 tablet by mouth once daily.     benzonatate 100 MG capsule  Commonly known as: TESSALON PERLES  1-2 capsules three times daily as need for cough.     blood-glucose meter kit  Commonly known as: BLOOD GLUCOSE MONITORING  Use as instructed     cetirizine 10 MG tablet  Commonly known as: ZYRTEC  Take 10 mg by mouth once daily.     cinnamon bark 500 mg capsule  Take 500 mg by mouth once daily.     fluticasone propionate 50 mcg/actuation nasal spray  Commonly known as: FLONASE  INHALE ONE SPRAY(S) IN EACH NOSTRIL ONCE DAILY AS NEEDED     furosemide 20 MG tablet  Commonly known as: LASIX  TAKE 1 TO 2 TABLETS BY MOUTH ONCE DAILY AS NEEDED FOR  SWELLING     naproxen 500 MG tablet  Commonly known as: NAPROSYN  Take 500 mg by mouth 2 (two) times daily with meals.     OCUVITE LUTEIN ORAL  Take 1 tablet by mouth once daily.     olmesartan 20 MG tablet  Commonly known as: BENICAR  Take 1 tablet (20 mg total) by mouth once daily.     pantoprazole 40 MG tablet  Commonly known as: PROTONIX  Take 1 tablet (40 mg total) by mouth before breakfast.     pen needle, diabetic 32 gauge x 3/16" Ndle  Use for daily insulin injections     polyethylene glycol 17 gram/dose powder  Commonly known as: GLYCOLAX  Use cap to measure 17 g, then mix in liquid and drink by mouth once daily.     PROAIR HFA 90 mcg/actuation inhaler  Generic drug: albuterol  INHALE 2 PUFFS BY MOUTH EVERY 6 HOURS AS NEEDED FOR WHEEZING " RESCUE     UNABLE TO FIND  Prevagen take 1 tablet daily     URINOZINC PROSTATE FORMULA ORAL  Take 1 tablet by mouth 2 (two) times daily.                I have seen and examined this patient within the last 30 days. My clinical findings that support the need for the home health skilled services and home bound status are the following:no   Weakness/numbness causing balance and gait disturbance due to Surgery making it taxing to leave home.     Diet:   regular diet    Labs:  None routine    Referrals/ Consults  Physical Therapy to evaluate and treat. Evaluate for home safety and equipment needs; Establish/upgrade home exercise program. Perform / instruct on therapeutic exercises, gait training, transfer training, and Range of Motion.  Occupational Therapy to evaluate and treat. Evaluate home environment for safety and equipment needs. Perform/Instruct on transfers, ADL training, ROM, and therapeutic exercises.    Activities:   no driving while on analgesics and no lifting for at least 4 weeks.    Nursing:   Agency to admit patient within 24 hours of hospital discharge unless specified on physician order or at patient request    SN to complete comprehensive assessment including routine vital signs. Instruct on disease process and s/s of complications to report to MD. Review/verify medication list sent home with the patient at time of discharge  and instruct patient/caregiver as needed. Frequency may be adjusted depending on start of care date.     Skilled nurse to perform up to 3 visits PRN for symptoms related to diagnosis    Notify MD if SBP > 160 or < 90; DBP > 90 or < 50; HR > 120 or < 50; Temp > 101; O2 < 88%    Ok to schedule additional visits based on staff availability and patient request on consecutive days within the home health episode.    When multiple disciplines ordered:    Start of Care occurs on Sunday - Wednesday schedule remaining discipline evaluations as ordered on separate consecutive days following  the start of care.    Thursday SOC -schedule subsequent evaluations Friday and Monday the following week.     Friday - Saturday SOC - schedule subsequent discipline evaluations on consecutive days starting Monday of the following week.    For all post-discharge communication and subsequent orders please contact patient's primary care physician. If unable to reach primary care physician or do not receive response within 30 minutes, please contact [facility] for clinical staff order clarification    Miscellaneous   Colostomy Care:  Instruct patient/caregiver to empty bag when full and PRN., Change and clean site every 48 hours, and Monitor skin integrity.    Home Health Aide:  Nursing (For ostomy care) Three times weekly, Physical Therapy Three times weekly, and Occupational Therapy Three times weekly    Wound Care Orders  yes:  Surgical Wound:  Location: Abdomen, midline    Consult ET nurse        Apply the following to wound:   Other: Dry gauze as needed. Change PRN (frequency)    I certify that this patient is confined to his home and needs intermittent skilled nursing care, physical therapy, and occupational therapy.    Sae De Santiago MD  General Surgery PGY-5  10/07/2024
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
## 2024-10-07 NOTE — SUBJECTIVE & OBJECTIVE
Subjective:     Interval History:   NAEO  Pain well controlled  Tolerating a diet, just not much appetite  Having ostomy function  Hgb stable on recheck yesterday    Post-Op Info:  Procedure(s) (LRB):  RESECTION, RECTUM, LOW ANTERIOR (N/A)  CYSTOSCOPY, WITH URETERAL STENT INSERTION (Bilateral)  APPENDECTOMY  LYSIS, ADHESIONS  LAPAROTOMY, EXPLORATORY  MOBILIZATION, SPLENIC FLEXURE  MOBILIZATION, HEPATIC FLEXURE  COLECTOMY, TRANSVERSE (Left)  COLONOSCOPY (N/A)  REPAIR, HERNIA, VENTRAL  SIGMOIDOSCOPY, FLEXIBLE  EXCISION, SMALL INTESTINE  WRAP-OMENTAL  APPENDECTOMY   4 Days Post-Op      Medications:  Continuous Infusions:   insulin regular 1 units/mL infusion orderable (TRANSFER)  0.9 Units/hr Intravenous Continuous 0.7 mL/hr at 10/07/24 0003 0.7 Units/hr at 10/07/24 0003     Scheduled Meds:   acetaminophen  1,000 mg Oral Q8H    alvimopan  12 mg Oral BID    gabapentin  300 mg Oral TID    insulin aspart U-100  4-8 Units Subcutaneous TIDWM    pantoprazole  40 mg Oral Before breakfast    tamsulosin  0.4 mg Oral Daily     PRN Meds:   acetaminophen tablet 1,000 mg    alvimopan capsule 12 mg    gabapentin capsule 300 mg    insulin aspart U-100 pen 4-8 Units    pantoprazole EC tablet 40 mg    tamsulosin 24 hr capsule 0.4 mg        Objective:     Vital Signs (Most Recent):  Temp: 99.1 °F (37.3 °C) (10/07/24 0747)  Pulse: 85 (10/07/24 0747)  Resp: 18 (10/07/24 0747)  BP: 119/69 (10/07/24 0747)  SpO2: (!) 91 % (10/07/24 0747) Vital Signs (24h Range):  Temp:  [97.6 °F (36.4 °C)-99.1 °F (37.3 °C)] 99.1 °F (37.3 °C)  Pulse:  [81-87] 85  Resp:  [17-20] 18  SpO2:  [91 %-97 %] 91 %  BP: (117-130)/(60-88) 119/69     Intake/Output - Last 3 Shifts         10/05 0700  10/06 0659 10/06 0700  10/07 0659 10/07 0700  10/08 0659    P.O. 950      I.V. (mL/kg) 1989.1 (18.2)      Total Intake(mL/kg) 2939.1 (26.8)      Urine (mL/kg/hr) 1430 (0.5) 1100 (0.4)     Stool 5 140     Total Output 1435 1240     Net +1504.1 -1240            Stool  Occurrence 0 x 0 x              Physical Exam  Constitutional:       Appearance: Normal appearance.   HENT:      Head: Normocephalic and atraumatic.   Eyes:      Conjunctiva/sclera: Conjunctivae normal.   Cardiovascular:      Rate and Rhythm: Normal rate.   Pulmonary:      Effort: Pulmonary effort is normal.   Abdominal:      Comments: Soft, minimal distension. Midline incision c,d,I. Colostomy healthy and pink. Gas and stool in bag   Skin:     General: Skin is warm and dry.   Neurological:      General: No focal deficit present.      Mental Status: He is alert.   Psychiatric:         Mood and Affect: Mood normal.          Significant Labs:  BMP:   Recent Labs   Lab 10/04/24  0532 10/04/24  1318 10/07/24  0407   *   < > 92      < > 139   K 5.3*   < > 4.1      < > 107   CO2 11*   < > 22*   BUN 24*   < > 16   CREATININE 1.5*   < > 0.8   CALCIUM 8.0*   < > 7.9*   MG 1.7  --   --     < > = values in this interval not displayed.     CBC:   Recent Labs   Lab 10/07/24  0556   WBC 7.38   RBC 2.36*   HGB 7.5*   HCT 21.0*      MCV 89   MCH 31.8*   MCHC 35.7     CRP (Last 3 Results):   Recent Labs   Lab 10/06/24  0620 10/07/24  0407   .6* 116.7*     All pertinent lab results within the last 24 hours have been reviewed.     Significant Diagnostics:  I have reviewed all pertinent imaging results/findings within the past 24 hours.

## 2024-10-07 NOTE — PLAN OF CARE
[facility] Plan of Care Note    Dx: Rectal cancer [C20]    Shift Events: Insullin drip currently at 0.9    Goals of Care: BG mgmt, Pain mgmt, ostomy care    Neuro: AOx4    Vital Signs: /88   Pulse 87   Temp 97.6 °F (36.4 °C) (Oral)   Resp 18   Ht 6' (1.829 m)   Wt 109.5 kg (241 lb 6.5 oz)   SpO2 (!) 92%   BMI 32.74 kg/m²     Respiratory: RA    Diet: Diet Low Fiber/Residue      Is patient tolerating current diet? yes    GTTS: Insulin @ 0.9    Urine Output/Bowel Movement:     Intake/Output Summary (Last 24 hours) at 10/6/2024 2007  Last data filed at 10/6/2024 1516  Gross per 24 hour   Intake 2339.09 ml   Output 920 ml   Net 1419.09 ml          Drains/Tubes/Tube Feeds (include total output/shift):   Output by Drain (mL) 10/04/24 0701 - 10/04/24 1900 10/04/24 1901 - 10/05/24 0700 10/05/24 0701 - 10/05/24 1900 10/05/24 1901 - 10/06/24 0700 10/06/24 0701 - 10/06/24 1900 10/06/24 1901 - 10/06/24 2007   Requested LDAs do not have output data documented.          Lines:       Accuchecks:Q4    Skin: Surgical sites    Fall Risk Score: See flowsheets    Activity level? See flowsheets    Any scheduled procedures? NA    Any safety concerns? Falls    Other: NA

## 2024-10-07 NOTE — SUBJECTIVE & OBJECTIVE
"Interval HPI:   No acute events overnight. Patient in room 1043/1043 A. Blood glucose improving. BG at and below goal on current insulin regimen (Transition Insulin Drip). Steroid use- None. 4 Days Post-Op  Renal function- Normal   Lab Results   Component Value Date    CREATININE 0.8 10/07/2024     Vasopressors-  None     Endocrine will continue to follow and manage insulin orders inpatient.     Diet Low Fiber/Residue     Eating:   <25%  Nausea: No  Hypoglycemia and intervention: No  Fever: No  TPN and/or TF: No  If yes, type of TF/TPN and rate: N/A    /69   Pulse 85   Temp 99.1 °F (37.3 °C) (Axillary)   Resp 18   Ht 6' (1.829 m)   Wt 109.5 kg (241 lb 6.5 oz)   SpO2 (!) 91%   BMI 32.74 kg/m²     Labs Reviewed and Include    Recent Labs   Lab 10/07/24  0407   GLU 92   CALCIUM 7.9*      K 4.1   CO2 22*      BUN 16   CREATININE 0.8     Lab Results   Component Value Date    WBC 7.38 10/07/2024    HGB 7.5 (L) 10/07/2024    HCT 21.0 (L) 10/07/2024    MCV 89 10/07/2024     10/07/2024     No results for input(s): "TSH", "FREET4" in the last 168 hours.  Lab Results   Component Value Date    HGBA1C 8.3 (H) 08/28/2024       Nutritional status:   Body mass index is 32.74 kg/m².  Lab Results   Component Value Date    ALBUMIN 2.6 (L) 10/03/2024    ALBUMIN 4.3 08/28/2024    ALBUMIN 4.3 06/20/2023     Lab Results   Component Value Date    PREALBUMIN 23 04/26/2023       Estimated Creatinine Clearance: 109.9 mL/min (based on SCr of 0.8 mg/dL).    Accu-Checks  Recent Labs     10/05/24  2337 10/06/24  0039 10/06/24  0442 10/06/24  0736 10/06/24  1158 10/06/24  1712 10/06/24  2018 10/06/24  2357 10/07/24  0416 10/07/24  0748   POCTGLUCOSE 171* 131* 141* 112* 177* 207* 199* 125* 100 103       Current Medications and/or Treatments Impacting Glycemic Control  Immunotherapy:    Immunosuppressants       None          Steroids:   Hormones (From admission, onward)      None          Pressors:    Autonomic Drugs " (From admission, onward)      None          Hyperglycemia/Diabetes Medications:   Antihyperglycemics (From admission, onward)      Start     Stop Route Frequency Ordered    10/07/24 1000  insulin glargine U-100 (Lantus) pen 13 Units         -- SubQ Daily 10/07/24 0853    10/05/24 1130  insulin aspart U-100 pen 4-8 Units         -- SubQ 3 times daily with meals 10/05/24 0941    10/05/24 1040  insulin aspart U-100 pen 0-10 Units         -- SubQ Every 4 hours PRN 10/05/24 0941

## 2024-10-07 NOTE — PT/OT/SLP PROGRESS
Occupational Therapy   Treatment    Name: KIRSTY Mason  MRN: [id number]  Admitting Diagnosis:  Rectal cancer  4 Days Post-Op    Recommendations:     Discharge Recommendations: Low Intensity Therapy  Discharge Equipment Recommendations:   (TBD)  Barriers to discharge:  None    Assessment:     KIRSTY Mason is a 68 y.o. male with a medical diagnosis of Rectal cancer.  He presents with the following performance deficits affecting function are weakness, impaired endurance, impaired self care skills, impaired functional mobility, decreased lower extremity function, pain, impaired skin.     Pt agreeable to therapy and was receptive to all OT education provided to promote independence with ADLs. Pt educated on AE and followed up with hands-on training. Pt would continue to benefit from skilled OT services to maximize functional independence with ADLs and functional mobility, reduce caregiver burden, and facilitate safe discharge in the least restrictive environment.      Rehab Prognosis:  Good; patient would benefit from acute skilled OT services to address these deficits and reach maximum level of function.       Plan:     Patient to be seen 4 x/week to address the above listed problems via self-care/home management, therapeutic activities, therapeutic exercises  Plan of Care Expires: 11/06/24  Plan of Care Reviewed with: patient    Subjective     Chief Complaint: abdominal pain 2/10  Patient/Family Comments/goals: pt agreeable to AE training/education  Pain/Comfort:  Pain Rating 1: 1/10  Location - Orientation 1: generalized  Location 1: abdomen  Pain Addressed 1: Reposition, Distraction  Pain Rating Post-Intervention 1: 2/10    Objective:     Communicated with: RN prior to session.  Patient found up in chair with colostomy, peripheral IV upon OT entry to room.    General Precautions: Standard, fall    Orthopedic Precautions:N/A  Braces: N/A  Respiratory Status: Room air     AMPAC 6 Click ADL: 16    Activities of daily living  (ADLs):   Lower body dressing: pt donned/doffed sock using adaptive equipment   - Pt utilized long hand reacher to dof sock with stand by assistance with verbal cueing   - Pt utilized sock aid to don sock with minimum assistance & verbal cueing    Treatment & Education:  - Pt educated on adaptive equipment (AE) usage to promote independence & safety with lower body dressing. Pt received verbal & visual demonstration on how to use sock aid, long handle reacher, dressing stick & long shoe horn. Pt then followed up with hands on training using long handle reacher & sock aid  - Education on potential benefits of utilizing a adaptable bed rail for home bed to improve bed mobility  - Education on log roll technique to improve bed mobility and minimize pain  -Education on importance of OOB activity to improve overall activity tolerance and promote recovery  -Pt educated to call for assistance and to transfer with hospital staff only  Pt had no further questions & when asked whether there were any concerns pt reported none.       Patient left up in chair with all lines intact, call button in reach, and wife present    GOALS:   Multidisciplinary Problems       Occupational Therapy Goals          Problem: Occupational Therapy    Goal Priority Disciplines Outcome Interventions   Occupational Therapy Goal     OT, PT/OT Progressing    Description: Goals to be met by: 11/06/2024     Patient will increase functional independence with ADLs by performing:    UE Dressing with [place].  LE Dressing with Modified [place].  Grooming while standing at sink with Modified [place].  Toileting from toilet with Modified [place] for hygiene and clothing management.   Supine to sit with Modified [place].  Step transfer with Modified [place]  Toilet transfer to toilet with Modified [place].                         Time Tracking:     OT Date of Treatment: 10/07/24  OT Start Time: 0946  OT Stop Time: 1012  OT  Total Time (min): 26 min    Billable Minutes:Self Care/Home Management 26    OT/RUDY: OT          10/7/2024

## 2024-10-08 VITALS
HEIGHT: 72 IN | RESPIRATION RATE: 18 BRPM | TEMPERATURE: 98 F | WEIGHT: 241.38 LBS | DIASTOLIC BLOOD PRESSURE: 64 MMHG | OXYGEN SATURATION: 95 % | SYSTOLIC BLOOD PRESSURE: 110 MMHG | BODY MASS INDEX: 32.69 KG/M2 | HEART RATE: 92 BPM

## 2024-10-08 LAB
ANION GAP SERPL CALC-SCNC: 7 MMOL/L (ref 8–16)
BASOPHILS # BLD AUTO: 0.02 K/UL (ref 0–0.2)
BASOPHILS NFR BLD: 0.3 % (ref 0–1.9)
BUN SERPL-MCNC: 14 MG/DL (ref 8–23)
CALCIUM SERPL-MCNC: 7.9 MG/DL (ref 8.7–10.5)
CHLORIDE SERPL-SCNC: 105 MMOL/L (ref 95–110)
CO2 SERPL-SCNC: 25 MMOL/L (ref 23–29)
CREAT SERPL-MCNC: 0.8 MG/DL (ref 0.5–1.4)
CRP SERPL-MCNC: 78.7 MG/L (ref 0–8.2)
DIFFERENTIAL METHOD BLD: ABNORMAL
EOSINOPHIL # BLD AUTO: 0.2 K/UL (ref 0–0.5)
EOSINOPHIL NFR BLD: 3.9 % (ref 0–8)
ERYTHROCYTE [DISTWIDTH] IN BLOOD BY AUTOMATED COUNT: 14 % (ref 11.5–14.5)
EST. GFR  (NO RACE VARIABLE): >60 ML/MIN/1.73 M^2
GLUCOSE SERPL-MCNC: 164 MG/DL (ref 70–110)
HCT VFR BLD AUTO: 22.4 % (ref 40–54)
HGB BLD-MCNC: 7.4 G/DL (ref 14–18)
IMM GRANULOCYTES # BLD AUTO: 0.26 K/UL (ref 0–0.04)
IMM GRANULOCYTES NFR BLD AUTO: 4.4 % (ref 0–0.5)
LYMPHOCYTES # BLD AUTO: 1 K/UL (ref 1–4.8)
LYMPHOCYTES NFR BLD: 16.1 % (ref 18–48)
MCH RBC QN AUTO: 30.2 PG (ref 27–31)
MCHC RBC AUTO-ENTMCNC: 33 G/DL (ref 32–36)
MCV RBC AUTO: 91 FL (ref 82–98)
MONOCYTES # BLD AUTO: 0.7 K/UL (ref 0.3–1)
MONOCYTES NFR BLD: 11.7 % (ref 4–15)
NEUTROPHILS # BLD AUTO: 3.8 K/UL (ref 1.8–7.7)
NEUTROPHILS NFR BLD: 63.6 % (ref 38–73)
NRBC BLD-RTO: 1 /100 WBC
PLATELET # BLD AUTO: 181 K/UL (ref 150–450)
PMV BLD AUTO: 10.2 FL (ref 9.2–12.9)
POCT GLUCOSE: 230 MG/DL (ref 70–110)
POCT GLUCOSE: 266 MG/DL (ref 70–110)
POTASSIUM SERPL-SCNC: 3.6 MMOL/L (ref 3.5–5.1)
RBC # BLD AUTO: 2.45 M/UL (ref 4.6–6.2)
SODIUM SERPL-SCNC: 137 MMOL/L (ref 136–145)
WBC # BLD AUTO: 5.91 K/UL (ref 3.9–12.7)

## 2024-10-08 PROCEDURE — 86140 C-REACTIVE PROTEIN: CPT | Performed by: STUDENT IN AN ORGANIZED HEALTH CARE EDUCATION/TRAINING PROGRAM

## 2024-10-08 PROCEDURE — 85025 COMPLETE CBC W/AUTO DIFF WBC: CPT | Performed by: STUDENT IN AN ORGANIZED HEALTH CARE EDUCATION/TRAINING PROGRAM

## 2024-10-08 PROCEDURE — 80048 BASIC METABOLIC PNL TOTAL CA: CPT | Performed by: STUDENT IN AN ORGANIZED HEALTH CARE EDUCATION/TRAINING PROGRAM

## 2024-10-08 PROCEDURE — 97530 THERAPEUTIC ACTIVITIES: CPT

## 2024-10-08 PROCEDURE — 25000003 PHARM REV CODE 250: Performed by: STUDENT IN AN ORGANIZED HEALTH CARE EDUCATION/TRAINING PROGRAM

## 2024-10-08 PROCEDURE — 25000003 PHARM REV CODE 250

## 2024-10-08 PROCEDURE — 36415 COLL VENOUS BLD VENIPUNCTURE: CPT | Performed by: STUDENT IN AN ORGANIZED HEALTH CARE EDUCATION/TRAINING PROGRAM

## 2024-10-08 RX ORDER — INSULIN ASPART 100 [IU]/ML
0-10 INJECTION, SOLUTION INTRAVENOUS; SUBCUTANEOUS
Status: DISCONTINUED | OUTPATIENT
Start: 2024-10-08 | End: 2024-10-08 | Stop reason: HOSPADM

## 2024-10-08 RX ORDER — INSULIN ASPART 100 [IU]/ML
8-10 INJECTION, SOLUTION INTRAVENOUS; SUBCUTANEOUS
Status: DISCONTINUED | OUTPATIENT
Start: 2024-10-08 | End: 2024-10-08 | Stop reason: HOSPADM

## 2024-10-08 RX ADMIN — TAMSULOSIN HYDROCHLORIDE 0.4 MG: 0.4 CAPSULE ORAL at 09:10

## 2024-10-08 RX ADMIN — PANTOPRAZOLE SODIUM 40 MG: 40 TABLET, DELAYED RELEASE ORAL at 07:10

## 2024-10-08 RX ADMIN — INSULIN ASPART 6 UNITS: 100 INJECTION, SOLUTION INTRAVENOUS; SUBCUTANEOUS at 09:10

## 2024-10-08 RX ADMIN — ALVIMOPAN 12 MG: 12 CAPSULE ORAL at 09:10

## 2024-10-08 RX ADMIN — INSULIN GLARGINE 13 UNITS: 100 INJECTION, SOLUTION SUBCUTANEOUS at 09:10

## 2024-10-08 RX ADMIN — GABAPENTIN 300 MG: 300 CAPSULE ORAL at 09:10

## 2024-10-08 RX ADMIN — OXYCODONE HYDROCHLORIDE 10 MG: 10 TABLET ORAL at 07:10

## 2024-10-08 RX ADMIN — INSULIN ASPART 8 UNITS: 100 INJECTION, SOLUTION INTRAVENOUS; SUBCUTANEOUS at 09:10

## 2024-10-08 RX ADMIN — ACETAMINOPHEN 1000 MG: 500 TABLET ORAL at 07:10

## 2024-10-08 NOTE — DISCHARGE SUMMARY
[facility]  General Surgery  Discharge Summary      Patient Name: KIRSTY Mason  MRN: [id number]  Admission Date: 10/3/2024  Hospital Length of Stay: 5 days  Discharge Date and Time:  10/08/2024 9:35 AM  Attending Physician: MEGHAN Hong MD   Discharging Provider: Munira Camacho MD  Primary Care Provider: Jass Mcdermott II, MD     HPI: 69 yo male with history of mid to upper rectal cancer status post prior low anterior resection with anastomosis at 8 cm from anal verge who has developed local anastomotic recurrence as well as a synchronous descending colon cancer,. There is no evidence of systemic spread. The patient has undergone boost pelvic radiotherapy with evidence of down-staging of the pelvic recurrence presenting for a redo LAR10/3/24.    Procedure(s) (LRB):  RESECTION, RECTUM, LOW ANTERIOR (N/A)  CYSTOSCOPY, WITH URETERAL STENT INSERTION (Bilateral)  APPENDECTOMY  LYSIS, ADHESIONS  LAPAROTOMY, EXPLORATORY  MOBILIZATION, SPLENIC FLEXURE  MOBILIZATION, HEPATIC FLEXURE  COLECTOMY, TRANSVERSE (Left)  COLONOSCOPY (N/A)  REPAIR, HERNIA, VENTRAL  SIGMOIDOSCOPY, FLEXIBLE  EXCISION, SMALL INTESTINE  WRAP-OMENTAL  APPENDECTOMY     Hospital Course: KIRSTY Mason presented to [facility] on the morning of 10/3/2024 for a redo low anterior resection with Siola's procedure and end colostomy, transverse and left colectomy with EN bloc small bowel resection, splenic flexure mobilization, hepatic flexure mobilization, appendectomy, pedicled omental graft to pelvis, ventral hernia repair primary. The procedure was performed without complication however following low anterior resection with stapling at the pelvic floor soft tissue biopsies of the presacral fascia and  parietal peritoneum of the pelvis revealed microscopic adenocarcinoma on frozen section.    Tissues of the pelvis were noted in the fibrotic and markedly altered including the rectal stump.  It was felt to be unsuitable for anastomosis.  R1 resection also factor  into the decision not to perform colo-anal anastomosis and to proceed with end colostomy. The patient was transferred to the floor for further post op care and monitoring. Patient's postoperative course was complicated by DKA with glucose levels in the 400 requiring an IV insulin drip that was transitioned off to long and short acting insulin. The rest of his postoperative care was as expected and he progressed according to plan.  His pain was controlled with a combination of IV and PO multimodal pain medications. His diet has been advanced throughout his hospital stay. His ostomy is healthy and functioning. He is now ambulating without assistance, tolerating a low residue diet, pain is well controlled with PO pain medication, and he is voiding appropriately. Patient now meets all criteria for discharge.    Physical Exam  Constitutional:       Appearance: Normal appearance.   HENT:      Head: Normocephalic and atraumatic.   Eyes:      Conjunctiva/sclera: Conjunctivae normal.   Cardiovascular:      Rate and Rhythm: Normal rate.   Pulmonary:      Effort: Pulmonary effort is normal.   Abdominal:      Comments: Soft, minimal distension. Midline incision c,d,I. Colostomy healthy and pink. Gas and stool in bag   Skin:     General: Skin is warm and dry.   Neurological:      General: No focal deficit present.      Mental Status: He is alert.   Psychiatric:         Mood and Affect: Mood normal.     Consults:   Consults (From admission, onward)          Status Ordering Provider     Inpatient consult to Midline team  Once        Provider:  (Not yet assigned)    Completed AVA GLASER     Inpatient consult to Endocrinology  Once        Provider:  (Not yet assigned)    Completed MAREN KAUR            Significant Diagnostic Studies: Labs: BMP:   Recent Labs   Lab 10/07/24  0005 10/07/24  0407 10/08/24  0537    92 164*   * 139 137   K 4.1 4.1 3.6    107 105   CO2 20* 22* 25   BUN 16 16 14   CREATININE  0.8 0.8 0.8   CALCIUM 7.9* 7.9* 7.9*   , CBC   Recent Labs   Lab 10/06/24  2111 10/07/24  0556 10/08/24  0537   WBC 8.42 7.38 5.91   HGB 8.0* 7.5* 7.4*   HCT 21.6* 21.0* 22.4*   * 160 181   , and All labs within the past 24 hours have been reviewed    Pending Diagnostic Studies:       Procedure Component Value Units Date/Time    Basic metabolic panel [[id number]] Collected: 10/05/24 0804    Order Status: Sent Lab Status: No result     Specimen: Blood     Specimen to Pathology, Surgery General Surgery [[id number]] Collected: 10/03/24 1515    Order Status: Sent Lab Status: In process Updated: 10/03/24 2000    Specimen: Tissue           Final Active Diagnoses:    Diagnosis Date Noted POA    PRINCIPAL PROBLEM:  Rectal cancer [C20] 05/19/2022 Yes    Steroid-induced hyperglycemia [R73.9, T38.0X5A] 10/04/2024 Unknown    BMI 32.0-32.9,adult [Z68.32] 09/27/2024 Not Applicable    Uncontrolled type 2 diabetes mellitus with hyperglycemia [E11.65] 02/14/2020 Yes      Problems Resolved During this Admission:      Discharged Condition: good    Disposition: Home or Self Care    Follow Up:   Follow-up Information       MEGHAN Hong MD Follow up in 2 week(s).    Specialty: Colon and Rectal Surgery  Contact information:  [address] [place] [address]  [phone]               Maria Del Carmen Tabares RN .    Specialty: Wound Care                         Patient Instructions:      Ambulatory referral/consult to [facility]   Standing Status: Future   Referral Priority: Routine Referral Type: Consultation   Referral Reason: Specialty Services Required   Requested Specialty: Wound Care   Number of Visits Requested: 1     Diet Adult Regular   Order Comments: Low fiber diet until seen in clinic     Lifting restrictions     Notify your health care provider if you experience any of the following:  temperature >100.4     Notify your health care provider if you experience any of the following:  persistent nausea and  vomiting or diarrhea     Notify your health care provider if you experience any of the following:  severe uncontrolled pain     Notify your health care provider if you experience any of the following:  redness, tenderness, or signs of infection (pain, swelling, redness, odor or green/yellow discharge around incision site)     No dressing needed     Activity as tolerated     Medications:  Reconciled Home Medications:      Medication List        START taking these medications      acetaminophen 500 MG tablet  Commonly known as: TYLENOL  Take 2 tablets (1,000 mg total) by mouth every 8 (eight) hours.     gabapentin 300 MG capsule  Commonly known as: NEURONTIN  Take 1 capsule (300 mg total) by mouth 3 (three) times daily. for 10 days     oxyCODONE 5 MG immediate release tablet  Commonly known as: ROXICODONE  Take 1 tablet (5 mg total) by mouth every 6 (six) hours as needed (Pain not controlled by scheduled tylenol).            CHANGE how you take these medications      insulin lispro 100 unit/mL pen  ADMINISTER 8 UNITS UNDER THE SKIN THREE TIMES DAILY BEFORE MEALS  What changed: See the new instructions.     LEVEMIR FLEXPEN 100 unit/mL (3 mL) Inpn pen  Generic drug: insulin detemir U-100 (Levemir)  ADMINISTER 25 UNITS UNDER THE SKIN EVERY DAY  What changed: See the new instructions.            CONTINUE taking these medications      b complex vitamins tablet  Take 1 tablet by mouth once daily.     benzonatate 100 MG capsule  Commonly known as: TESSALON PERLES  1-2 capsules three times daily as need for cough.     blood-glucose meter kit  Commonly known as: BLOOD GLUCOSE MONITORING  Use as instructed     cetirizine 10 MG tablet  Commonly known as: ZYRTEC  Take 10 mg by mouth once daily.     cinnamon bark 500 mg capsule  Take 500 mg by mouth once daily.     fluticasone propionate 50 mcg/actuation nasal spray  Commonly known as: FLONASE  INHALE ONE SPRAY(S) IN EACH NOSTRIL ONCE DAILY AS NEEDED     furosemide 20 MG  "tablet  Commonly known as: LASIX  TAKE 1 TO 2 TABLETS BY MOUTH ONCE DAILY AS NEEDED FOR  SWELLING     naproxen 500 MG tablet  Commonly known as: NAPROSYN  Take 500 mg by mouth 2 (two) times daily with meals.     OCUVITE LUTEIN ORAL  Take 1 tablet by mouth once daily.     olmesartan 20 MG tablet  Commonly known as: BENICAR  Take 1 tablet (20 mg total) by mouth once daily.     pantoprazole 40 MG tablet  Commonly known as: PROTONIX  Take 1 tablet (40 mg total) by mouth before breakfast.     pen needle, diabetic 32 gauge x 3/16" Ndle  Use for daily insulin injections     polyethylene glycol 17 gram/dose powder  Commonly known as: GLYCOLAX  Use cap to measure 17 g, then mix in liquid and drink by mouth once daily.     PROAIR HFA 90 mcg/actuation inhaler  Generic drug: albuterol  INHALE 2 PUFFS BY MOUTH EVERY 6 HOURS AS NEEDED FOR WHEEZING RESCUE     UNABLE TO FIND  Prevagen take 1 tablet daily     URINOZINC PROSTATE FORMULA ORAL  Take 1 tablet by mouth 2 (two) times daily.              Munira Camacho MD  General Surgery  [facility]  "
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
## 2024-10-08 NOTE — DISCHARGE INSTRUCTIONS
POSTOPERATIVE INSTRUCTIONS FOLLOWING LAR    The following are post-operative instructions that will help you to recover from your surgery.  Please read over these instructions carefully and contact us if we can answer any of your questions or concerns.    Dressing/Incision care  Your incision is covered with dermabound. This protects the incision from dirt and bacteria. Please leave in place as it will flake off on its own in about 2 weeks.  You may shower the day after surgery.  Do not take a tub bath and do not soak the surgical site.    Activity   You should be able to return to your regular activities 2 days after your surgery.  However, do not engage in strenuous activities or lifting more than 10 pounds for about 6 weeks.    Medication for pain  You may find that over the counter pain medications may be sufficient for your pain. You should alternate scheduled tylenol and ibuprofen on a schedule for the first few days if you have no contraindications for the medications.  You will be given a prescription for pain medication for more severe pain.  You should not drive or operate machinery while taking these.  Do not exceed more than 4000mg of Tylenol per day. Do not use Tylenol if you are taking a narcotic pain medication that includes it such as OxyContin. Do not exceed more than 3200mg of ibuprofen per day. Please take narcotics with food.  Narcotics can cause, or worse, constipation.  You will need to increase your fluid intake, eat high fiber foods (such as fruits and bran) and make sure that you are up and walking. You may need to take an over the counter stool softener for constipation.    Please report the following:  Temperature greater than 101 degrees  Discharge or bad odor from the wound  Excessive bleeding, such as bloody dressing or extreme bruising  Redness at incision and/or drain sites  Swelling or buildup of fluid around incision  Extreme uncontrolled pain  Inability to tolerate food or  liquids    Additional information  You will need a follow up appointment approximately 2 weeks following your surgery.  If this follow-up appointment has not been made, please call the office at the number listed on the discharge paperwork.

## 2024-10-08 NOTE — PT/OT/SLP PROGRESS
Occupational Therapy   Treatment    Name: KIRSTY Mason  MRN: [id number]  Admitting Diagnosis:  Rectal cancer  5 Days Post-Op    Recommendations:     Discharge Recommendations: Low Intensity Therapy  Discharge Equipment Recommendations:  hip kit  Barriers to discharge:  None    Assessment:     KIRSTY Mason is a 68 y.o. male with a medical diagnosis of Rectal cancer.  He presents with the following performance deficits affecting function are weakness, impaired endurance, impaired self care skills, impaired functional mobility, pain, decreased upper extremity function, decreased lower extremity function.     Pt agreeable to therapy and tolerated fairly well. Pt is demonstrating improvements with functional mobility & overall activity tolerance. Pt would continue to benefit from skilled OT services to maximize functional independence with ADLs and functional mobility, reduce caregiver burden, and facilitate safe discharge in the least restrictive environment.      Rehab Prognosis:  Good; patient would benefit from acute skilled OT services to address these deficits and reach maximum level of function.       Plan:     Patient to be seen 3 x/week to address the above listed problems via self-care/home management, therapeutic activities, therapeutic exercises  Plan of Care Expires: 11/06/24  Plan of Care Reviewed with: patient, spouse    Subjective     Chief Complaint: none reported  Patient/Family Comments/goals: return home  Pain/Comfort:  Pain Rating 1: 0/10    Objective:     Communicated with: RN prior to session. Patient found up in chair with colostomy, peripheral IV upon OT entry to room.    General Precautions: Standard, fall    Orthopedic Precautions:N/A  Braces: N/A  Respiratory Status: Room air     Occupational Performance:     Functional Mobility/Transfers:  Patient completed Sit <> Stand Transfer with contact guard assistance  with  no assistive device   Pt required increased time and had attempted stand twice to achieve  full stand  Pt relied heavily on arm rests of chair    Patient completed Chair Transfer Step Transfer technique with stand by assistance with rolling walker    Functional Mobility: Pt ambulated in room & hallway to simulate household environment & community distances to improve overall strength, activity tolerance & coordination required for participation/independence with MRADLs & IADLs  Pt ambulated a total of 82 ft using rolling walker with stand by assistance  Pt demonstrated SOB but was able to pace himself & make it back to his room safely  No LOB noted    Activities of Daily Living:  Upper Body Dressing: moderate assistance to don gown over back while standing with no AD      [facility] 6 Click ADL: 16    Treatment & Education:  -Education on energy conservation and task/home modifications to maximize safety and (I) during ADLs/IADLs and mobility  -Education on importance of OOB activity to maintain/improve overall strength, activity tolerance and promote recovery  -Pt educated to call for assistance and to transfer with hospital staff only  Pt had no further questions & when asked whether there were any concerns pt reported none.     Patient left up in chair with call button in reach, RN notified, and wife present    GOALS:   Multidisciplinary Problems       Occupational Therapy Goals          Problem: Occupational Therapy    Goal Priority Disciplines Outcome Interventions   Occupational Therapy Goal     OT, PT/OT Progressing    Description: Goals to be met by: 11/06/2024     Patient will increase functional independence with ADLs by performing:    UE Dressing with [place].  LE Dressing with Modified [place].  Grooming while standing at sink with Modified [place].  Toileting from toilet with Modified [place] for hygiene and clothing management.   Supine to sit with Modified [place].  Step transfer with Modified [place]  Toilet transfer to toilet with Modified [place].                          Time Tracking:     OT Date of Treatment: 10/08/24  OT Start Time: 1010  OT Stop Time: 1024  OT Total Time (min): 14 min    Billable Minutes:Therapeutic Activity 14    OT/RUDY: OT          10/8/2024

## 2024-10-08 NOTE — PT/OT/SLP PROGRESS
Physical Therapy Treatment    Patient Name:  KIRSTY Mason   MRN:  [id number]    Recommendations:     Discharge Recommendations: Low Intensity Therapy  Discharge Equipment Recommendations: none  Barriers to discharge: None    Assessment:     KIRSTY Mason is a 68 y.o. male admitted with a medical diagnosis of Rectal cancer.  He presents with the following impairments/functional limitations: weakness, impaired endurance, impaired self care skills, impaired functional mobility, decreased upper extremity function, decreased lower extremity function. Patient reports that dizziness upon standing has decreased and that he feels much better when ambulating. The patient tolerated the treatment session well. Pt could benefit from low intensity therapy for skilled balance training, strength, endurance, therapeutic exercise intervention, and mobility with adaptive equipment training.     Rehab Prognosis: Good; patient would benefit from acute skilled PT services to address these deficits and reach maximum level of function.    Recent Surgery: Procedure(s) (LRB):  RESECTION, RECTUM, LOW ANTERIOR (N/A)  CYSTOSCOPY, WITH URETERAL STENT INSERTION (Bilateral)  APPENDECTOMY  LYSIS, ADHESIONS  LAPAROTOMY, EXPLORATORY  MOBILIZATION, SPLENIC FLEXURE  MOBILIZATION, HEPATIC FLEXURE  COLECTOMY, TRANSVERSE (Left)  COLONOSCOPY (N/A)  REPAIR, HERNIA, VENTRAL  SIGMOIDOSCOPY, FLEXIBLE  EXCISION, SMALL INTESTINE  WRAP-OMENTAL  APPENDECTOMY 5 Days Post-Op    Plan:     During this hospitalization, patient to be seen 4 x/week to address the identified rehab impairments via gait training, therapeutic activities, therapeutic exercises, neuromuscular re-education and progress toward the following goals:    Plan of Care Expires:  11/07/24    Subjective     Chief Complaint: mild pain from surgery   Patient/Family Comments/goals: eager to go home   Pain/Comfort:  Pain Rating 1: 0/10  Location - Orientation 1: generalized  Location 1: abdomen  Pain Addressed 1:  Reposition, Distraction  Pain Rating Post-Intervention 1: 0/10      Objective:     Communicated with nurse prior to session.  Patient found up in chair with colostomy upon PT entry to room.     General Precautions: Standard, fall  Orthopedic Precautions: N/A  Braces: N/A  Respiratory Status: Room air     Functional Mobility:  Transfers:   Step transfer into shower: SBA, grab bar, R hand on RW, and full LE clearance with no LOB.   Sit to Stand:  stand by assistance with rolling walker, pt required increase time to STS.   Gait: Stand by assistance for 15' with RW. Mild trunk flexion, decreased gait speed, and decreased step length.       AM-PAC 6 CLICK MOBILITY  Turning over in bed (including adjusting bedclothes, sheets and blankets)?: 3  Sitting down on and standing up from a chair with arms (e.g., wheelchair, bedside commode, etc.): 3  Moving from lying on back to sitting on the side of the bed?: 3  Moving to and from a bed to a chair (including a wheelchair)?: 3  Need to walk in hospital room?: 3  Climbing 3-5 steps with a railing?: 2  Basic Mobility Total Score: 17       Treatment & Education:  Pt educated to modify the height for chairs at home to enhance his ability to complete a STS.   All questions answered within PT scope of practice.   Functional mobility utilized to address pts overall strength and endurance deficits.      Patient left  seated in shower  with  wife present to help assist and RN aware pt taking shower.     GOALS:   Multidisciplinary Problems       Physical Therapy Goals          Problem: Physical Therapy    Goal Priority Disciplines Outcome Interventions   Physical Therapy Goal     PT, PT/OT Progressing    Description: Goals to be met by: 10/20     Patient will increase functional independence with mobility by performin. Supine to sit with Stand-by Assistance  2. Sit to supine with Stand-by Assistance  3. Sit to stand transfer with Stand-by Assistance  4. Gait  x 200 feet with  Stand-by Assistance using LRAD.     DME Justification  Patient has a mobility limitation that significantly impairs their ability to participate in one or more mobility related activities of daily living in customary locations in the home. The mobility limitation cannot be sufficiently resolved by the use of a cane or walker. The use of a manual wheelchair will greatly improve the patient's ability to participate in MRADLs. The patient will use the wheelchair on a regular basis at home. They have expressed their willingness to use a manual wheelchair in the home, and have a caregiver who is available and willing to assist with the wheelchair if needed.                       Time Tracking:     PT Received On: 10/08/24  PT Start Time: 1141     PT Stop Time: 1149  PT Total Time (min): 8 min     Billable Minutes: Therapeutic Activity 8    Treatment Type: Treatment  PT/PTA: PT     Number of PTA visits since last PT visit: 0     10/08/2024

## 2024-10-11 LAB
FINAL PATHOLOGIC DIAGNOSIS: ABNORMAL
FROZEN SECTION DIAGNOSIS: ABNORMAL
FROZEN SECTION FOOTNOTE: ABNORMAL
GROSS: ABNORMAL
Lab: ABNORMAL

## 2024-10-18 ENCOUNTER — OFFICE VISIT (OUTPATIENT)
Dept: SURGERY | Facility: CLINIC | Age: 69
End: 2024-10-18
Payer: MEDICARE

## 2024-10-18 ENCOUNTER — LAB VISIT (OUTPATIENT)
Dept: LAB | Facility: HOSPITAL | Age: 69
End: 2024-10-18
Attending: COLON & RECTAL SURGERY
Payer: MEDICARE

## 2024-10-18 VITALS
OXYGEN SATURATION: 97 % | WEIGHT: 240.94 LBS | DIASTOLIC BLOOD PRESSURE: 76 MMHG | RESPIRATION RATE: 17 BRPM | HEART RATE: 77 BPM | HEIGHT: 73 IN | TEMPERATURE: 97 F | SYSTOLIC BLOOD PRESSURE: 127 MMHG | BODY MASS INDEX: 31.93 KG/M2

## 2024-10-18 DIAGNOSIS — C20 RECTAL CANCER: Primary | ICD-10-CM

## 2024-10-18 DIAGNOSIS — C20 RECTAL CANCER: ICD-10-CM

## 2024-10-18 DIAGNOSIS — C18.6 MALIGNANT NEOPLASM OF DESCENDING COLON: ICD-10-CM

## 2024-10-18 LAB
ALBUMIN SERPL BCP-MCNC: 3.1 G/DL (ref 3.5–5.2)
ALP SERPL-CCNC: 375 U/L (ref 40–150)
ALT SERPL W/O P-5'-P-CCNC: 45 U/L (ref 10–44)
ANION GAP SERPL CALC-SCNC: 12 MMOL/L (ref 8–16)
AST SERPL-CCNC: 42 U/L (ref 10–40)
BASOPHILS # BLD AUTO: 0.08 K/UL (ref 0–0.2)
BASOPHILS NFR BLD: 1.2 % (ref 0–1.9)
BILIRUB SERPL-MCNC: 0.7 MG/DL (ref 0.1–1)
BUN SERPL-MCNC: 13 MG/DL (ref 8–23)
CALCIUM SERPL-MCNC: 8.6 MG/DL (ref 8.7–10.5)
CHLORIDE SERPL-SCNC: 98 MMOL/L (ref 95–110)
CO2 SERPL-SCNC: 27 MMOL/L (ref 23–29)
CREAT SERPL-MCNC: 1.2 MG/DL (ref 0.5–1.4)
CRP SERPL-MCNC: 38.8 MG/L (ref 0–8.2)
DIFFERENTIAL METHOD BLD: ABNORMAL
EOSINOPHIL # BLD AUTO: 0.6 K/UL (ref 0–0.5)
EOSINOPHIL NFR BLD: 8.3 % (ref 0–8)
ERYTHROCYTE [DISTWIDTH] IN BLOOD BY AUTOMATED COUNT: 13.5 % (ref 11.5–14.5)
EST. GFR  (NO RACE VARIABLE): >60 ML/MIN/1.73 M^2
GLUCOSE SERPL-MCNC: 258 MG/DL (ref 70–110)
HCT VFR BLD AUTO: 27.7 % (ref 40–54)
HGB BLD-MCNC: 8.8 G/DL (ref 14–18)
IMM GRANULOCYTES # BLD AUTO: 0.03 K/UL (ref 0–0.04)
IMM GRANULOCYTES NFR BLD AUTO: 0.4 % (ref 0–0.5)
LYMPHOCYTES # BLD AUTO: 1 K/UL (ref 1–4.8)
LYMPHOCYTES NFR BLD: 14.9 % (ref 18–48)
MCH RBC QN AUTO: 29.8 PG (ref 27–31)
MCHC RBC AUTO-ENTMCNC: 31.8 G/DL (ref 32–36)
MCV RBC AUTO: 94 FL (ref 82–98)
MONOCYTES # BLD AUTO: 0.8 K/UL (ref 0.3–1)
MONOCYTES NFR BLD: 11.2 % (ref 4–15)
NEUTROPHILS # BLD AUTO: 4.4 K/UL (ref 1.8–7.7)
NEUTROPHILS NFR BLD: 64 % (ref 38–73)
NRBC BLD-RTO: 0 /100 WBC
PLATELET # BLD AUTO: 406 K/UL (ref 150–450)
PMV BLD AUTO: 9 FL (ref 9.2–12.9)
POTASSIUM SERPL-SCNC: 4.8 MMOL/L (ref 3.5–5.1)
PREALB SERPL-MCNC: 16 MG/DL (ref 20–43)
PROT SERPL-MCNC: 6.8 G/DL (ref 6–8.4)
RBC # BLD AUTO: 2.95 M/UL (ref 4.6–6.2)
SODIUM SERPL-SCNC: 137 MMOL/L (ref 136–145)
WBC # BLD AUTO: 6.89 K/UL (ref 3.9–12.7)

## 2024-10-18 PROCEDURE — 3074F SYST BP LT 130 MM HG: CPT | Mod: CPTII,S$GLB,, | Performed by: COLON & RECTAL SURGERY

## 2024-10-18 PROCEDURE — 4010F ACE/ARB THERAPY RXD/TAKEN: CPT | Mod: CPTII,S$GLB,, | Performed by: COLON & RECTAL SURGERY

## 2024-10-18 PROCEDURE — 3052F HG A1C>EQUAL 8.0%<EQUAL 9.0%: CPT | Mod: CPTII,S$GLB,, | Performed by: COLON & RECTAL SURGERY

## 2024-10-18 PROCEDURE — 3066F NEPHROPATHY DOC TX: CPT | Mod: CPTII,S$GLB,, | Performed by: COLON & RECTAL SURGERY

## 2024-10-18 PROCEDURE — 3061F NEG MICROALBUMINURIA REV: CPT | Mod: CPTII,S$GLB,, | Performed by: COLON & RECTAL SURGERY

## 2024-10-18 PROCEDURE — 3288F FALL RISK ASSESSMENT DOCD: CPT | Mod: CPTII,S$GLB,, | Performed by: COLON & RECTAL SURGERY

## 2024-10-18 PROCEDURE — 99999 PR PBB SHADOW E&M-EST. PATIENT-LVL V: CPT | Mod: PBBFAC,,, | Performed by: COLON & RECTAL SURGERY

## 2024-10-18 PROCEDURE — 1101F PT FALLS ASSESS-DOCD LE1/YR: CPT | Mod: CPTII,S$GLB,, | Performed by: COLON & RECTAL SURGERY

## 2024-10-18 PROCEDURE — 86140 C-REACTIVE PROTEIN: CPT | Performed by: COLON & RECTAL SURGERY

## 2024-10-18 PROCEDURE — 80053 COMPREHEN METABOLIC PANEL: CPT | Mod: PN | Performed by: COLON & RECTAL SURGERY

## 2024-10-18 PROCEDURE — 1126F AMNT PAIN NOTED NONE PRSNT: CPT | Mod: CPTII,S$GLB,, | Performed by: COLON & RECTAL SURGERY

## 2024-10-18 PROCEDURE — 84134 ASSAY OF PREALBUMIN: CPT | Performed by: COLON & RECTAL SURGERY

## 2024-10-18 PROCEDURE — 3078F DIAST BP <80 MM HG: CPT | Mod: CPTII,S$GLB,, | Performed by: COLON & RECTAL SURGERY

## 2024-10-18 PROCEDURE — 1159F MED LIST DOCD IN RCRD: CPT | Mod: CPTII,S$GLB,, | Performed by: COLON & RECTAL SURGERY

## 2024-10-18 PROCEDURE — 85025 COMPLETE CBC W/AUTO DIFF WBC: CPT | Mod: PN | Performed by: COLON & RECTAL SURGERY

## 2024-10-18 PROCEDURE — 99024 POSTOP FOLLOW-UP VISIT: CPT | Mod: S$GLB,,, | Performed by: COLON & RECTAL SURGERY

## 2024-10-18 PROCEDURE — 36415 COLL VENOUS BLD VENIPUNCTURE: CPT | Mod: PN | Performed by: COLON & RECTAL SURGERY

## 2024-10-18 RX ORDER — OXYCODONE HYDROCHLORIDE 5 MG/1
5 TABLET ORAL EVERY 6 HOURS PRN
Qty: 15 TABLET | Refills: 0 | Status: SHIPPED | OUTPATIENT
Start: 2024-10-18

## 2024-10-19 ENCOUNTER — PATIENT MESSAGE (OUTPATIENT)
Dept: SURGERY | Facility: CLINIC | Age: 69
End: 2024-10-19
Payer: MEDICARE

## 2024-10-19 RX ORDER — FERROUS SULFATE 325(65) MG
325 TABLET ORAL 2 TIMES DAILY
Qty: 120 TABLET | Refills: 0 | Status: SHIPPED | OUTPATIENT
Start: 2024-10-19
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
## 2024-10-21 ENCOUNTER — OFFICE VISIT (OUTPATIENT)
Dept: UROLOGY | Facility: CLINIC | Age: 69
End: 2024-10-21
Payer: MEDICARE

## 2024-10-21 VITALS — WEIGHT: 236.75 LBS | BODY MASS INDEX: 31.38 KG/M2 | HEIGHT: 73 IN

## 2024-10-21 DIAGNOSIS — M79.89 LEG SWELLING: Primary | ICD-10-CM

## 2024-10-21 DIAGNOSIS — R31.29 MICROSCOPIC HEMATURIA: ICD-10-CM

## 2024-10-21 DIAGNOSIS — Z87.448 HISTORY OF HEMATURIA: ICD-10-CM

## 2024-10-21 PROCEDURE — 3288F FALL RISK ASSESSMENT DOCD: CPT | Mod: CPTII,S$GLB,,

## 2024-10-21 PROCEDURE — 1125F AMNT PAIN NOTED PAIN PRSNT: CPT | Mod: CPTII,S$GLB,,

## 2024-10-21 PROCEDURE — 3052F HG A1C>EQUAL 8.0%<EQUAL 9.0%: CPT | Mod: CPTII,S$GLB,,

## 2024-10-21 PROCEDURE — 1101F PT FALLS ASSESS-DOCD LE1/YR: CPT | Mod: CPTII,S$GLB,,

## 2024-10-21 PROCEDURE — 4010F ACE/ARB THERAPY RXD/TAKEN: CPT | Mod: CPTII,S$GLB,,

## 2024-10-21 PROCEDURE — 99999 PR PBB SHADOW E&M-EST. PATIENT-LVL IV: CPT | Mod: PBBFAC,,,

## 2024-10-21 PROCEDURE — 99203 OFFICE O/P NEW LOW 30 MIN: CPT | Mod: S$GLB,,,

## 2024-10-21 PROCEDURE — 3061F NEG MICROALBUMINURIA REV: CPT | Mod: CPTII,S$GLB,,

## 2024-10-21 PROCEDURE — 1111F DSCHRG MED/CURRENT MED MERGE: CPT | Mod: CPTII,S$GLB,,

## 2024-10-21 PROCEDURE — 3066F NEPHROPATHY DOC TX: CPT | Mod: CPTII,S$GLB,,

## 2024-10-21 PROCEDURE — 1160F RVW MEDS BY RX/DR IN RCRD: CPT | Mod: CPTII,S$GLB,,

## 2024-10-21 PROCEDURE — 1159F MED LIST DOCD IN RCRD: CPT | Mod: CPTII,S$GLB,,

## 2024-10-21 PROCEDURE — 3008F BODY MASS INDEX DOCD: CPT | Mod: CPTII,S$GLB,,

## 2024-10-21 RX ORDER — FUROSEMIDE 20 MG/1
20 TABLET ORAL 2 TIMES DAILY
Qty: 60 TABLET | Refills: 11 | Status: SHIPPED | OUTPATIENT
Start: 2024-10-21 | End: 2024-10-24 | Stop reason: SDUPTHER
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
## 2024-10-22 PROBLEM — Z71.89 OSTOMY NURSE CONSULTATION: Status: ACTIVE | Noted: 2024-10-22

## 2024-10-23 ENCOUNTER — TELEPHONE (OUTPATIENT)
Dept: UROLOGY | Facility: CLINIC | Age: 69
End: 2024-10-23
Payer: MEDICARE

## 2024-10-23 ENCOUNTER — PATIENT MESSAGE (OUTPATIENT)
Dept: UROLOGY | Facility: CLINIC | Age: 69
End: 2024-10-23
Payer: MEDICARE

## 2024-10-23 DIAGNOSIS — N30.00 ACUTE CYSTITIS WITHOUT HEMATURIA: Primary | ICD-10-CM

## 2024-10-23 RX ORDER — AMPICILLIN 500 MG/1
500 CAPSULE ORAL 3 TIMES DAILY
Qty: 30 CAPSULE | Refills: 0 | Status: SHIPPED | OUTPATIENT
Start: 2024-10-23 | End: 2024-11-02

## 2024-10-23 NOTE — TELEPHONE ENCOUNTER
----- Message from Priscilla Matos NP sent at 10/23/2024  9:36 AM CDT -----  Bacteria in urine- sent in ampicillin to pharmacy, waiting for final report

## 2024-10-23 NOTE — TELEPHONE ENCOUNTER
----- Message from Carine sent at 10/23/2024 10:34 AM CDT -----  Contact: self  Type:  Patient Returning Call    Who Called:  pt  Who Left Message for Patient:  sindhu  Does the patient know what this is regarding?:  yes  Best Call Back Number:  [phone]   Additional Information:  please call

## 2024-10-24 ENCOUNTER — TELEPHONE (OUTPATIENT)
Dept: UROLOGY | Facility: CLINIC | Age: 69
End: 2024-10-24
Payer: MEDICARE

## 2024-10-24 NOTE — TELEPHONE ENCOUNTER
----- Message from Priscilla Matos NP sent at 10/24/2024  1:53 PM CDT -----  Continue ampicillin as prescribed   PROGRESS NOTE:   Authoted by Dr. Gee Rivas MD  Pager [phone] [facility], [id number]     Patient is a 56y old  Male who presents with a chief complaint of Rule out COVID 19 infection (25 Apr 2020 09:59)      SUBJECTIVE / OVERNIGHT EVENTS: The patient was seen and examined at bedside. No acute events overnight the patient is comfortable. He is having a bit of back pain that he has been having over the past week or two.     REVIEW OF SYSTEMS:    CONSTITUTIONAL: No weakness, fevers or chills  EYES/ENT: No visual changes;  No vertigo or throat pain   NECK: No pain or stiffness  RESPIRATORY: No cough, wheezing, hemoptysis; No shortness of breath  CARDIOVASCULAR: No chest pain or palpitations  GASTROINTESTINAL: No abdominal or epigastric pain. No nausea, vomiting, or hematemesis; No diarrhea or constipation. No melena or hematochezia.  GENITOURINARY: No dysuria, frequency or hematuria  NEUROLOGICAL: No numbness or weakness  SKIN: No itching, rashes    MEDICATIONS  (STANDING):  ascorbic acid  Oral Tab/Cap - Peds 500 milliGRAM(s) Oral two times a day  aspirin  chewable 81 milliGRAM(s) Oral daily  atorvastatin 80 milliGRAM(s) Oral at bedtime  bisacodyl 5 milliGRAM(s) Oral daily  carvedilol 3.125 milliGRAM(s) Oral every 12 hours  chlorhexidine 4% Liquid 1 Application(s) Topical two times a day  clopidogrel Tablet 75 milliGRAM(s) Oral daily  dextrose 5%. 1000 milliLiter(s) (50 mL/Hr) IV Continuous <Continuous>  dextrose 50% Injectable 12.5 Gram(s) IV Push once  dextrose 50% Injectable 25 Gram(s) IV Push once  dextrose 50% Injectable 25 Gram(s) IV Push once  ertapenem  IVPB 500 milliGRAM(s) IV Intermittent every 24 hours  ferrous sulfate Oral Tab/Cap - Peds 325 milliGRAM(s) Oral daily  heparin  Injectable 5000 Unit(s) SubCutaneous every 8 hours  hydrALAZINE 25 milliGRAM(s) Oral two times a day  insulin glargine SubCutaneous Injection (LANTUS) - Peds 6 Unit(s) SubCutaneous at bedtime  insulin lispro (HumaLOG) corrective regimen sliding scale   SubCutaneous three times a day before meals  insulin lispro (HumaLOG) corrective regimen sliding scale   SubCutaneous at bedtime  levothyroxine 75 MICROGram(s) Oral daily  polyethylene glycol 3350 17 Gram(s) Oral daily  potassium chloride    Tablet ER 10 milliEquivalent(s) Oral daily  predniSONE   Tablet 5 milliGRAM(s) Oral daily  sevelamer carbonate 800 milliGRAM(s) Oral three times a day with meals  sodium chloride 0.9%. 1000 milliLiter(s) (50 mL/Hr) IV Continuous <Continuous>  tacrolimus 2 milliGRAM(s) Oral <User Schedule>    MEDICATIONS  (PRN):  acetaminophen   Tablet .. 650 milliGRAM(s) Oral every 4 hours PRN Temp greater or equal to 38.5C (101.3F)  acetaminophen   Tablet .. 650 milliGRAM(s) Oral every 6 hours PRN Temp greater or equal to 38C (100.4F), Mild Pain (1 - 3), Moderate Pain (4 - 6)  acetaminophen  Suppository .. 650 milliGRAM(s) Rectal every 4 hours PRN Temp greater or equal to 38.5C (101.3F)  dextrose 40% Gel 15 Gram(s) Oral once PRN Blood Glucose LESS THAN 70 milliGRAM(s)/deciliter  glucagon  Injectable 1 milliGRAM(s) IntraMuscular once PRN Glucose LESS THAN 70 milligrams/deciliter  ondansetron Injectable 4 milliGRAM(s) IV Push every 8 hours PRN Nausea and/or Vomiting  sucralfate 1 Gram(s) Oral every 6 hours PRN With each meal      CAPILLARY BLOOD GLUCOSE      POCT Blood Glucose.: 120 mg/dL (25 Apr 2020 11:46)  POCT Blood Glucose.: 98 mg/dL (25 Apr 2020 07:51)  POCT Blood Glucose.: 102 mg/dL (24 Apr 2020 21:54)  POCT Blood Glucose.: 90 mg/dL (24 Apr 2020 16:57)    I&O's Summary    24 Apr 2020 07:01  -  25 Apr 2020 07:00  --------------------------------------------------------  IN: 655 mL / OUT: 250 mL / NET: 405 mL        PHYSICAL EXAM:  Vital Signs Last 24 Hrs  T(C): 36.7 (25 Apr 2020 12:36), Max: 37.4 (24 Apr 2020 19:06)  T(F): 98 (25 Apr 2020 12:36), Max: 99.3 (24 Apr 2020 19:06)  HR: 58 (25 Apr 2020 12:36) (58 - 69)  BP: 150/73 (25 Apr 2020 12:36) (100/57 - 150/73)  BP(mean): --  RR: 18 (25 Apr 2020 12:36) (18 - 18)  SpO2: 100% (25 Apr 2020 12:36) (98% - 100%)    CONSTITUTIONAL: NAD, well-developed  RESPIRATORY: Normal respiratory effort  CARDIOVASCULAR: Regular rate and rhythm, normal S1 and S2, no murmur/rub/gallop; No lower extremity edema; Peripheral pulses are 2+ bilaterally  ABDOMEN: Nontender to palpation, normoactive bowel sounds, no rebound/guarding; No hepatosplenomegaly  MUSCLOSKELETAL: no clubbing or cyanosis of digits; no joint swelling or tenderness to palpation L forearm AV fistula  PSYCH: A+O to person, place, and time; affect appropriate    LABS:                        10.0   3.18  )-----------( 138      ( 25 Apr 2020 05:00 )             33.7     04-25    135  |  107  |  90<H>  ----------------------------<  90  4.9   |  19<L>  |  4.79<H>    Ca    8.8      25 Apr 2020 05:00  Phos  4.3     04-25  Mg     1.9     04-25    TPro  5.8<L>  /  Alb  2.4<L>  /  TBili  < 0.2<L>  /  DBili  x   /  AST  24  /  ALT  14  /  AlkPhos  64  04-25    COORDINATION OF CARE:  Care Discussed with Consultants/Other Providers [Y/N]: care discussed with nephrology  Prior or Outpatient Records Reviewed [Y/N]:

## 2024-10-30 ENCOUNTER — PATIENT MESSAGE (OUTPATIENT)
Dept: SURGERY | Facility: CLINIC | Age: 69
End: 2024-10-30
Payer: MEDICARE

## 2024-11-24 PROBLEM — E87.1 HYPONATREMIA: Status: ACTIVE | Noted: 2024-11-24

## 2024-11-24 PROBLEM — Z71.89 ACP (ADVANCE CARE PLANNING): Status: ACTIVE | Noted: 2024-11-24

## 2024-11-24 PROBLEM — R18.8 FREE FLUID IN PELVIS: Status: ACTIVE | Noted: 2024-11-24

## 2024-11-24 PROBLEM — A41.9 SEPSIS: Status: ACTIVE | Noted: 2024-11-24

## 2024-11-25 PROBLEM — Z74.09 OTHER REDUCED MOBILITY: Status: ACTIVE | Noted: 2024-11-25

## 2024-11-26 PROBLEM — R78.81 BACTEREMIA DUE TO KLEBSIELLA PNEUMONIAE: Status: ACTIVE | Noted: 2024-11-26

## 2024-11-26 PROBLEM — B96.1 BACTEREMIA DUE TO KLEBSIELLA PNEUMONIAE: Status: ACTIVE | Noted: 2024-11-26

## 2025-01-02 ENCOUNTER — TELEPHONE (OUTPATIENT)
Dept: SURGERY | Facility: CLINIC | Age: 70
End: 2025-01-02
Payer: MEDICARE

## 2025-01-02 NOTE — TELEPHONE ENCOUNTER
RN returned patient's call -- pt's wife, Maria Guadalupe inquired about nephrology code on billing --  RN did not see nephrology referral in chart to follow up  post operatively. Maria Guadalupe stated there was no billing charge, she just wanted to clarify if IKRSTY needed nephrology follow up.    Pt voiced understanding of call and appreciated returned call. Advised to call back with any questions or concerns. Provided phone number for central pricing if billing concerns should arise.       ----- Message from Alberta sent at 1/2/2025 12:05 PM CST -----  Type: Needs Medical Advice  Who Called:  Maria Guadalupe (spouse)  Symptoms (please be specific):    How long has patient had these symptoms:    Pharmacy name and phone #:    Best Call Back Number: [phone]  Additional Information: Maria Guadalupe is requesting a call back from Ira in regards to a billing code for neuropathy on her husbands bill.

## 2025-02-15 PROBLEM — R78.81 BACTEREMIA DUE TO KLEBSIELLA PNEUMONIAE: Status: RESOLVED | Noted: 2024-11-26 | Resolved: 2025-02-15

## 2025-02-15 PROBLEM — M54.50 CHRONIC BILATERAL LOW BACK PAIN WITHOUT SCIATICA: Status: RESOLVED | Noted: 2024-09-27 | Resolved: 2025-02-15

## 2025-02-15 PROBLEM — Z87.448 HISTORY OF HEMATURIA: Status: RESOLVED | Noted: 2024-09-27 | Resolved: 2025-02-15

## 2025-02-15 PROBLEM — K42.9 UMBILICAL HERNIA: Status: RESOLVED | Noted: 2024-09-27 | Resolved: 2025-02-15

## 2025-02-15 PROBLEM — Z86.718 HISTORY OF DVT (DEEP VEIN THROMBOSIS): Status: RESOLVED | Noted: 2024-09-27 | Resolved: 2025-02-15

## 2025-02-15 PROBLEM — T38.0X5A STEROID-INDUCED HYPERGLYCEMIA: Status: RESOLVED | Noted: 2024-10-04 | Resolved: 2025-02-15

## 2025-02-15 PROBLEM — E87.20 LACTIC ACIDOSIS: Status: ACTIVE | Noted: 2025-02-15

## 2025-02-15 PROBLEM — E87.6 HYPOKALEMIA: Status: ACTIVE | Noted: 2025-02-15

## 2025-02-15 PROBLEM — N39.0 COMPLICATED UTI (URINARY TRACT INFECTION): Status: ACTIVE | Noted: 2025-02-15

## 2025-02-15 PROBLEM — Z86.16 HISTORY OF COVID-19: Status: RESOLVED | Noted: 2024-09-27 | Resolved: 2025-02-15

## 2025-02-15 PROBLEM — E11.42 TYPE 2 DIABETES MELLITUS WITH DIABETIC POLYNEUROPATHY, WITH LONG-TERM CURRENT USE OF INSULIN: Status: RESOLVED | Noted: 2023-05-23 | Resolved: 2025-02-15

## 2025-02-15 PROBLEM — E11.39 TYPE 2 DIABETES MELLITUS WITH OPHTHALMIC COMPLICATION, WITH LONG-TERM CURRENT USE OF INSULIN: Status: RESOLVED | Noted: 2023-05-23 | Resolved: 2025-02-15

## 2025-02-15 PROBLEM — E11.3293 CONTROLLED TYPE 2 DIABETES MELLITUS WITH BOTH EYES AFFECTED BY MILD NONPROLIFERATIVE RETINOPATHY WITHOUT MACULAR EDEMA, WITHOUT LONG-TERM CURRENT USE OF INSULIN: Status: RESOLVED | Noted: 2020-11-17 | Resolved: 2025-02-15

## 2025-02-15 PROBLEM — Z79.4 TYPE 2 DIABETES MELLITUS WITH OPHTHALMIC COMPLICATION, WITH LONG-TERM CURRENT USE OF INSULIN: Status: RESOLVED | Noted: 2023-05-23 | Resolved: 2025-02-15

## 2025-02-15 PROBLEM — Z86.79 HISTORY OF RHEUMATIC FEVER: Status: RESOLVED | Noted: 2024-09-27 | Resolved: 2025-02-15

## 2025-02-15 PROBLEM — Z01.818 PRE-OP EVALUATION: Status: RESOLVED | Noted: 2023-03-28 | Resolved: 2025-02-15

## 2025-02-15 PROBLEM — R73.9 STEROID-INDUCED HYPERGLYCEMIA: Status: RESOLVED | Noted: 2024-10-04 | Resolved: 2025-02-15

## 2025-02-15 PROBLEM — Z79.4 TYPE 2 DIABETES MELLITUS WITH DIABETIC POLYNEUROPATHY, WITH LONG-TERM CURRENT USE OF INSULIN: Status: RESOLVED | Noted: 2023-05-23 | Resolved: 2025-02-15

## 2025-02-15 PROBLEM — Z74.09 OTHER REDUCED MOBILITY: Status: RESOLVED | Noted: 2024-11-25 | Resolved: 2025-02-15

## 2025-02-15 PROBLEM — B96.1 BACTEREMIA DUE TO KLEBSIELLA PNEUMONIAE: Status: RESOLVED | Noted: 2024-11-26 | Resolved: 2025-02-15

## 2025-02-15 PROBLEM — E87.1 HYPONATREMIA: Status: RESOLVED | Noted: 2024-11-24 | Resolved: 2025-02-15

## 2025-02-15 PROBLEM — R74.8 ALKALINE PHOSPHATASE ELEVATION: Status: RESOLVED | Noted: 2024-09-27 | Resolved: 2025-02-15

## 2025-02-15 PROBLEM — G89.29 CHRONIC BILATERAL LOW BACK PAIN WITHOUT SCIATICA: Status: RESOLVED | Noted: 2024-09-27 | Resolved: 2025-02-15

## 2025-02-15 PROBLEM — A41.9 SEPSIS: Status: RESOLVED | Noted: 2024-11-24 | Resolved: 2025-02-15

## 2025-02-15 PROBLEM — Z71.89 OSTOMY NURSE CONSULTATION: Status: RESOLVED | Noted: 2024-10-22 | Resolved: 2025-02-15

## 2025-02-15 PROBLEM — J32.9 RHINOSINUSITIS: Status: RESOLVED | Noted: 2020-02-12 | Resolved: 2025-02-15

## 2025-02-17 ENCOUNTER — TELEPHONE (OUTPATIENT)
Dept: UROLOGY | Facility: CLINIC | Age: 70
End: 2025-02-17
Payer: MEDICARE

## 2025-02-17 NOTE — TELEPHONE ENCOUNTER
----- Message from Lyla sent at 2/17/2025 12:38 PM CST -----  Contact: WifeMaria Guadalupe  Type:  Appointment RequestCaller is requesting a sooner appointment.  Caller declined first available appointment listed below.  Caller will not accept being placed on the waitlist and is requesting a message be sent to doctor.Name of Caller:  Martin Vladez is the first available appointment?  N/AWould the patient rather a call back or a response via [facility]?   Call [facility] Call Back Number:   [phone] - LisaAdditional Information:   States they would like to speak with someone to schedule an appointment with Dr Jacobs - states he was a patient of Dr Jacobs prior to him coming to [facility] - states e is having recurring UTI's since November - please call - thank you

## 2025-02-21 ENCOUNTER — OFFICE VISIT (OUTPATIENT)
Dept: UROLOGY | Facility: CLINIC | Age: 70
End: 2025-02-21
Payer: MEDICARE

## 2025-02-21 VITALS — BODY MASS INDEX: 29.13 KG/M2 | WEIGHT: 219.81 LBS | HEIGHT: 73 IN

## 2025-02-21 DIAGNOSIS — N30.00 ACUTE CYSTITIS WITHOUT HEMATURIA: Primary | ICD-10-CM

## 2025-02-21 RX ORDER — AMOXICILLIN AND CLAVULANATE POTASSIUM 875; 125 MG/1; MG/1
1 TABLET, FILM COATED ORAL 2 TIMES DAILY
Qty: 14 TABLET | Refills: 0 | Status: SHIPPED | OUTPATIENT
Start: 2025-02-21 | End: 2025-02-28

## 2025-02-21 NOTE — PROGRESS NOTES
Subjective:       Patient ID: KIRSTY Mason is a 69 y.o. male.    Chief Complaint: Follow-up and Urinary Tract Infection    HPI    69-year-old with rectal cancer.  He was treated with pelvic radiation followed by APR about 3 months ago.  He had stents placed preoperatively.  Since his surgery he has had multiple urinary tract infections.  He was recently hospitalized.  Initially he was treated with Macrobid followed by Cipro.  He is currently taking Augmentin.  He has no symptoms today.  Prior to his surgery had no history of UTIs.  He has no bothersome urinary symptoms at baseline.     Urine Culture   11/24/2024 No growth    1/13/2025 KLEBSIELLA PNEUMONIAE !    2/6/2025 KLEBSIELLA PNEUMONIAE !    2/14/2025 KLEBSIELLA PNEUMONIAE !    2/15/2025 KLEBSIELLA PNEUMONIAE !         Review of Systems   Constitutional:  Negative for fever.   Genitourinary:  Negative for dysuria and hematuria.       Objective:      Physical Exam  Vitals reviewed.   Constitutional:       Appearance: He is well-developed.   Pulmonary:      Effort: Pulmonary effort is normal.   Skin:     Findings: No rash.   Neurological:      Mental Status: He is alert and oriented to person, place, and time.         Assessment:       1. Acute cystitis without hematuria        Plan:       Acute cystitis without hematuria  -     POCT Urinalysis(Instrument)    Other orders  -     amoxicillin-clavulanate 875-125mg (AUGMENTIN) 875-125 mg per tablet; Take 1 tablet by mouth 2 (two) times daily. for 7 days  Dispense: 14 tablet; Refill: 0      Decrease risk of infection:    Increase hydration  Take daily cranberry pills and probiotics  Complete full course of antibiotics.  I extended another week.     I personally reviewed the CT scan images and made an independent interpretation of the test completed by another healthcare professional.

## 2025-04-09 PROBLEM — E11.9 TYPE 2 DIABETES MELLITUS, WITHOUT LONG-TERM CURRENT USE OF INSULIN: Status: ACTIVE | Noted: 2020-02-14

## 2025-04-09 PROBLEM — Z79.4 TYPE 2 DIABETES MELLITUS, WITH LONG-TERM CURRENT USE OF INSULIN: Status: ACTIVE | Noted: 2020-02-14

## 2025-04-09 PROBLEM — E11.9 TYPE 2 DIABETES MELLITUS, WITHOUT LONG-TERM CURRENT USE OF INSULIN: Status: RESOLVED | Noted: 2020-02-14 | Resolved: 2025-04-09

## 2025-04-10 PROBLEM — N13.1 HYDRONEPHROSIS DUE TO OBSTRUCTION OF URETER: Status: ACTIVE | Noted: 2025-04-10

## 2025-04-13 PROBLEM — K65.1 PELVIC ABSCESS IN MALE: Status: ACTIVE | Noted: 2025-04-13

## 2025-05-01 ENCOUNTER — OFFICE VISIT (OUTPATIENT)
Dept: UROLOGY | Facility: CLINIC | Age: 70
End: 2025-05-01
Payer: MEDICARE

## 2025-05-01 ENCOUNTER — TELEPHONE (OUTPATIENT)
Dept: UROLOGY | Facility: CLINIC | Age: 70
End: 2025-05-01

## 2025-05-01 VITALS — HEIGHT: 73 IN | BODY MASS INDEX: 29.46 KG/M2 | WEIGHT: 222.25 LBS

## 2025-05-01 DIAGNOSIS — N39.0 RECURRENT UTI: Primary | ICD-10-CM

## 2025-05-01 DIAGNOSIS — N13.1 HYDRONEPHROSIS DUE TO OBSTRUCTION OF URETER: ICD-10-CM

## 2025-05-01 DIAGNOSIS — N39.0 COMPLICATED UTI (URINARY TRACT INFECTION): ICD-10-CM

## 2025-05-01 DIAGNOSIS — N13.1 HYDRONEPHROSIS DUE TO URETERAL STRICTURE: ICD-10-CM

## 2025-05-01 DIAGNOSIS — Z92.3 HISTORY OF RADIATION EXPOSURE: ICD-10-CM

## 2025-05-01 LAB
BILIRUBIN, UA POC OHS: NEGATIVE
BLOOD, UA POC OHS: ABNORMAL
CLARITY, UA POC OHS: CLEAR
COLOR, UA POC OHS: YELLOW
GLUCOSE, UA POC OHS: NEGATIVE
KETONES, UA POC OHS: NEGATIVE
LEUKOCYTES, UA POC OHS: NEGATIVE
NITRITE, UA POC OHS: NEGATIVE
PH, UA POC OHS: 5.5
PROTEIN, UA POC OHS: 30
SPECIFIC GRAVITY, UA POC OHS: 1.02
UROBILINOGEN, UA POC OHS: 0.2

## 2025-05-01 PROCEDURE — 1111F DSCHRG MED/CURRENT MED MERGE: CPT | Mod: CPTII,S$GLB,, | Performed by: STUDENT IN AN ORGANIZED HEALTH CARE EDUCATION/TRAINING PROGRAM

## 2025-05-01 PROCEDURE — 1101F PT FALLS ASSESS-DOCD LE1/YR: CPT | Mod: CPTII,S$GLB,, | Performed by: STUDENT IN AN ORGANIZED HEALTH CARE EDUCATION/TRAINING PROGRAM

## 2025-05-01 PROCEDURE — 1160F RVW MEDS BY RX/DR IN RCRD: CPT | Mod: CPTII,S$GLB,, | Performed by: STUDENT IN AN ORGANIZED HEALTH CARE EDUCATION/TRAINING PROGRAM

## 2025-05-01 PROCEDURE — 1159F MED LIST DOCD IN RCRD: CPT | Mod: CPTII,S$GLB,, | Performed by: STUDENT IN AN ORGANIZED HEALTH CARE EDUCATION/TRAINING PROGRAM

## 2025-05-01 PROCEDURE — 3008F BODY MASS INDEX DOCD: CPT | Mod: CPTII,S$GLB,, | Performed by: STUDENT IN AN ORGANIZED HEALTH CARE EDUCATION/TRAINING PROGRAM

## 2025-05-01 PROCEDURE — G2211 COMPLEX E/M VISIT ADD ON: HCPCS | Mod: S$GLB,,, | Performed by: STUDENT IN AN ORGANIZED HEALTH CARE EDUCATION/TRAINING PROGRAM

## 2025-05-01 PROCEDURE — 99999 PR PBB SHADOW E&M-EST. PATIENT-LVL III: CPT | Mod: PBBFAC,,, | Performed by: STUDENT IN AN ORGANIZED HEALTH CARE EDUCATION/TRAINING PROGRAM

## 2025-05-01 PROCEDURE — 3288F FALL RISK ASSESSMENT DOCD: CPT | Mod: CPTII,S$GLB,, | Performed by: STUDENT IN AN ORGANIZED HEALTH CARE EDUCATION/TRAINING PROGRAM

## 2025-05-01 PROCEDURE — 81003 URINALYSIS AUTO W/O SCOPE: CPT | Mod: QW,S$GLB,, | Performed by: STUDENT IN AN ORGANIZED HEALTH CARE EDUCATION/TRAINING PROGRAM

## 2025-05-01 PROCEDURE — 87086 URINE CULTURE/COLONY COUNT: CPT | Performed by: STUDENT IN AN ORGANIZED HEALTH CARE EDUCATION/TRAINING PROGRAM

## 2025-05-01 PROCEDURE — 3052F HG A1C>EQUAL 8.0%<EQUAL 9.0%: CPT | Mod: CPTII,S$GLB,, | Performed by: STUDENT IN AN ORGANIZED HEALTH CARE EDUCATION/TRAINING PROGRAM

## 2025-05-01 PROCEDURE — 99214 OFFICE O/P EST MOD 30 MIN: CPT | Mod: S$GLB,,, | Performed by: STUDENT IN AN ORGANIZED HEALTH CARE EDUCATION/TRAINING PROGRAM

## 2025-05-01 PROCEDURE — 1126F AMNT PAIN NOTED NONE PRSNT: CPT | Mod: CPTII,S$GLB,, | Performed by: STUDENT IN AN ORGANIZED HEALTH CARE EDUCATION/TRAINING PROGRAM

## 2025-05-01 NOTE — PROGRESS NOTES
"[facility] - Urology   Clinic Note    Subjective:     Chief Complaint: Follow-up (3 wk follow up )      History of Present Illness    CHIEF COMPLAINT:  Edgar presents today for follow-up after hospitalization    He has a history of rectal cancer treated with pelvic radiation and APR 10/24 with Dr. Hong. He has had multiple urinary tract infections over the last few months. Most recently admitted - febrile to 102.8.  He underwent left ureteral stent insertion for left hydronephrosis and diagnosed with a radiation induced ureteral stricture. Repeat CT showed improvement in the hydronephrosis.     RECURRENT INFECTIONS:  He has experienced recurrent infections since November, occurring approximately every 6 weeks. Previous infection in February showed resistance to Ciprofloxacin.       Urine Culture   10/22/2024 ENTEROCOCCUS FAECALIS !    1/13/2025 KLEBSIELLA PNEUMONIAE !    2/6/2025 KLEBSIELLA PNEUMONIAE !    2/14/2025 KLEBSIELLA PNEUMONIAE !    2/15/2025 KLEBSIELLA PNEUMONIAE !    4/9/2025 GRAM NEGATIVE HANNA ! (P)     ONCOLOGY:  He maintains weekly follow-up with oncology nurse practitioner. Oncologist is Dr. Gomez. Most recent molecular detection report from last month was 0.08.        Past medical, family, surgical and social history reviewed as documented in chart with pertinent positive medical, family, surgical and social history detailed in HPI.    A review systems was conducted with pertinent positive and negative findings documented in HPI.    Objective:     Estimated body mass index is 29.32 kg/m² as calculated from the following:    Height as of this encounter: 6' 1" (1.854 m).    Weight as of this encounter: 100.8 kg (222 lb 3.6 oz).    Vital Signs (Most Recent)       General: No acute distress, well developed.   Head: Normocephalic, atraumatic  CV: no cyanosis  Lungs: normal respiratory effort, no respiratory distress    Labs reviewed below:  Lab Results   Component Value Date    BUN 12 04/14/2025    " CREATININE 0.89 04/14/2025    WBC 7.11 04/14/2025    HGB 10.2 (L) 04/14/2025    HCT 30.0 (L) 04/14/2025     (L) 04/14/2025    AST 18 04/14/2025    ALT 13 04/14/2025    ALKPHOS 144 04/14/2025    ALBUMIN 2.4 (L) 04/14/2025    HGBA1C 8.3 (H) 02/16/2025        PSA trend reviewed below:  Lab Results   Component Value Date    PSA 0.64 02/28/2024    PSADIAG 0.8 06/03/2021    PSADIAG 0.6 03/15/2019    PSADIAG 2.6 03/02/2018    PSADIAG 0.8 12/30/2016        Lab Results   Component Value Date    BLOODUA Trace-intact (A) 05/01/2025    WBCUR Negative 05/01/2025    NITRITE Negative 05/01/2025       Assessment:     1. Recurrent UTI    2. Complicated UTI (urinary tract infection)    3. Hydronephrosis due to obstruction of ureter    4. Hydronephrosis due to ureteral stricture    5. History of radiation exposure      Plan:     Assessment & Plan    - Reviewed CT Ureter results showing stent functioning well to keep ureter open and urine draining.  - Discussed management options for ureteral stricture, likely radiation-induced:  - Continue with current stent, requiring exchange every 3 months.  - Consider longer-lasting Resonance metallic stent, exchangeable annually.  - Surgical ureteral reimplantation   - He would prefer to continue with stent management over surgery due to age, cancer status, and surgical risks.  - Stent intervention should decrease frequency and severity of UTIs.    - Explained ureteral stricture etiology, potentially due to radiation, infection, or surgical scarring.  - Discussed risks of untreated ureteral blockage, including recurrent UTIs, kidney infections, and sepsis.  - Described stent function in maintaining urine drainage and preventing infection.  - if he develops issues despite the stent, we may need to more strongly consider reimplant    - Ordered urinalysis and culture to be performed today.    - Follow up for resonance stent in a few weeks       The visit today included increased complexity  associated with the care of the episodic problem addressed above as well as managing the longitudinal care of the patient due to the serious and/or complex managed problem(s).      Portions of this note were generated by [employer] Direct dictation services    Justin Tripathi MD

## 2025-05-03 LAB — BACTERIA UR CULT: NO GROWTH

## 2025-05-05 ENCOUNTER — TELEPHONE (OUTPATIENT)
Dept: UROLOGY | Facility: CLINIC | Age: 70
End: 2025-05-05
Payer: MEDICARE

## 2025-05-05 ENCOUNTER — RESULTS FOLLOW-UP (OUTPATIENT)
Dept: UROLOGY | Facility: CLINIC | Age: 70
End: 2025-05-05

## 2025-05-05 NOTE — TELEPHONE ENCOUNTER
----- Message from Priscilla Matos NP sent at 5/5/2025 10:38 AM CDT -----  Urine neg for infection (abdullahi)  ----- Message -----  From: Bora Greer MA  Sent: 5/1/2025   2:05 PM CDT  To: Justin Tripathi MD

## 2025-05-14 NOTE — ASSESSMENT & PLAN NOTE
Has pain upon standing up  No urinary incontinence or leg weakness   Detail Level: Detailed Depth Of Biopsy: dermis Was A Bandage Applied: Yes Size Of Lesion In Cm: 0 Anticipated Plan (Based On Presumed Biopsy Results): Currently on odomzo Biopsy Type: H and E Biopsy Method: Dermablade Anesthesia Type: 1% lidocaine with epinephrine Anesthesia Volume In Cc: 0.5 Hemostasis: Drysol Wound Care: Petrolatum Dressing: bandage Destruction After The Procedure: No Type Of Destruction Used: Curettage Curettage Text: The wound bed was treated with curettage after the biopsy was performed. Cryotherapy Text: The wound bed was treated with cryotherapy after the biopsy was performed. Electrodesiccation Text: The wound bed was treated with electrodesiccation after the biopsy was performed. Electrodesiccation And Curettage Text: The wound bed was treated with electrodesiccation and curettage after the biopsy was performed. Silver Nitrate Text: The wound bed was treated with silver nitrate after the biopsy was performed. Lab: [address] Notes (To The Dermatopathologist): Check margins and base Medical Necessity Information: It is in your best interest to select a reason for this procedure from the list below. All of these items fulfill various CMS LCD requirements except the new and changing color options. Consent: Written consent was obtained and risks were reviewed including but not limited to scarring, infection, bleeding, scabbing, incomplete removal, nerve damage and allergy to anesthesia. Post-Care Instructions: I reviewed with the patient in detail post-care instructions. Patient is to keep the biopsy site dry overnight, and then apply bacitracin twice daily until healed. Patient may apply hydrogen peroxide soaks to remove any crusting. Notification Instructions: Patient will be notified of biopsy results. However, patient instructed to call the office if not contacted within 2 weeks. Billing Type: Third-Party Bill Information: Selecting Yes will display possible errors in your note based on the variables you have selected. This validation is only offered as a suggestion for you. PLEASE NOTE THAT THE VALIDATION TEXT WILL BE REMOVED WHEN YOU FINALIZE YOUR NOTE. IF YOU WANT TO FAX A PRELIMINARY NOTE YOU WILL NEED TO TOGGLE THIS TO 'NO' IF YOU DO NOT WANT IT IN YOUR FAXED NOTE.

## 2025-06-11 ENCOUNTER — TELEPHONE (OUTPATIENT)
Dept: UROLOGY | Facility: CLINIC | Age: 70
End: 2025-06-11
Payer: MEDICARE

## 2025-06-18 PROBLEM — Z93.3 COLOSTOMY IN PLACE: Status: ACTIVE | Noted: 2025-06-18

## 2025-06-18 PROBLEM — Z79.4 TYPE 2 DIABETES MELLITUS WITH RIGHT EYE AFFECTED BY MILD NONPROLIFERATIVE RETINOPATHY AND MACULAR EDEMA, WITH LONG-TERM CURRENT USE OF INSULIN: Status: ACTIVE | Noted: 2025-06-18

## 2025-06-18 PROBLEM — E11.3211 TYPE 2 DIABETES MELLITUS WITH RIGHT EYE AFFECTED BY MILD NONPROLIFERATIVE RETINOPATHY AND MACULAR EDEMA, WITH LONG-TERM CURRENT USE OF INSULIN: Status: ACTIVE | Noted: 2025-06-18

## 2025-07-18 ENCOUNTER — PATIENT MESSAGE (OUTPATIENT)
Dept: SURGERY | Facility: CLINIC | Age: 70
End: 2025-07-18
Payer: MEDICARE

## 2025-07-30 ENCOUNTER — OFFICE VISIT (OUTPATIENT)
Dept: SURGERY | Facility: CLINIC | Age: 70
End: 2025-07-30
Payer: MEDICARE

## 2025-07-30 VITALS
SYSTOLIC BLOOD PRESSURE: 120 MMHG | BODY MASS INDEX: 30.65 KG/M2 | OXYGEN SATURATION: 98 % | DIASTOLIC BLOOD PRESSURE: 72 MMHG | TEMPERATURE: 98 F | RESPIRATION RATE: 18 BRPM | HEIGHT: 73 IN | WEIGHT: 231.25 LBS | HEART RATE: 80 BPM

## 2025-07-30 DIAGNOSIS — Z08 ENCOUNTER FOR FOLLOW-UP SURVEILLANCE OF COLON CANCER: Primary | ICD-10-CM

## 2025-07-30 DIAGNOSIS — Z85.038 ENCOUNTER FOR FOLLOW-UP SURVEILLANCE OF COLON CANCER: Primary | ICD-10-CM

## 2025-07-30 DIAGNOSIS — Z85.048 ENCOUNTER FOR FOLLOW-UP SURVEILLANCE OF RECTAL CANCER: ICD-10-CM

## 2025-07-30 DIAGNOSIS — Z08 ENCOUNTER FOR FOLLOW-UP SURVEILLANCE OF RECTAL CANCER: ICD-10-CM

## 2025-07-30 PROCEDURE — 1101F PT FALLS ASSESS-DOCD LE1/YR: CPT | Mod: CPTII,S$GLB,, | Performed by: COLON & RECTAL SURGERY

## 2025-07-30 PROCEDURE — 1125F AMNT PAIN NOTED PAIN PRSNT: CPT | Mod: CPTII,S$GLB,, | Performed by: COLON & RECTAL SURGERY

## 2025-07-30 PROCEDURE — 3051F HG A1C>EQUAL 7.0%<8.0%: CPT | Mod: CPTII,S$GLB,, | Performed by: COLON & RECTAL SURGERY

## 2025-07-30 PROCEDURE — 3074F SYST BP LT 130 MM HG: CPT | Mod: CPTII,S$GLB,, | Performed by: COLON & RECTAL SURGERY

## 2025-07-30 PROCEDURE — 99214 OFFICE O/P EST MOD 30 MIN: CPT | Mod: S$GLB,,, | Performed by: COLON & RECTAL SURGERY

## 2025-07-30 PROCEDURE — 3288F FALL RISK ASSESSMENT DOCD: CPT | Mod: CPTII,S$GLB,, | Performed by: COLON & RECTAL SURGERY

## 2025-07-30 PROCEDURE — 99999 PR PBB SHADOW E&M-EST. PATIENT-LVL III: CPT | Mod: PBBFAC,,, | Performed by: COLON & RECTAL SURGERY

## 2025-07-30 PROCEDURE — 1159F MED LIST DOCD IN RCRD: CPT | Mod: CPTII,S$GLB,, | Performed by: COLON & RECTAL SURGERY

## 2025-07-30 PROCEDURE — 3078F DIAST BP <80 MM HG: CPT | Mod: CPTII,S$GLB,, | Performed by: COLON & RECTAL SURGERY

## 2025-07-30 PROCEDURE — 3008F BODY MASS INDEX DOCD: CPT | Mod: CPTII,S$GLB,, | Performed by: COLON & RECTAL SURGERY

## 2025-07-31 ENCOUNTER — PATIENT MESSAGE (OUTPATIENT)
Dept: SURGERY | Facility: CLINIC | Age: 70
End: 2025-07-31
Payer: MEDICARE

## 2025-07-31 ENCOUNTER — DOCUMENTATION ONLY (OUTPATIENT)
Dept: SURGERY | Facility: CLINIC | Age: 70
End: 2025-07-31
Payer: MEDICARE

## 2025-07-31 DIAGNOSIS — C20 RECTAL CANCER: Primary | ICD-10-CM

## 2025-08-04 ENCOUNTER — DOCUMENTATION ONLY (OUTPATIENT)
Dept: SURGERY | Facility: CLINIC | Age: 70
End: 2025-08-04
Payer: MEDICARE

## 2025-08-04 ENCOUNTER — PATIENT MESSAGE (OUTPATIENT)
Dept: SURGERY | Facility: CLINIC | Age: 70
End: 2025-08-04
Payer: MEDICARE

## (undated) DEVICE — SUT CTD VICRYL VIL BR SH 27

## (undated) DEVICE — SUT MONOCRYL 4-0 PS-1 UND

## (undated) DEVICE — SOL CLEARIFY VISUALIZATION LAP

## (undated) DEVICE — SUT CTD VICRYL 2-0 VIL BR

## (undated) DEVICE — SYR IRRIGATION BULB STER 60ML

## (undated) DEVICE — RELOAD ECHELON FLEX GRN 60MM

## (undated) DEVICE — ELECTRODE REM PLYHSV RETURN 9

## (undated) DEVICE — CLIP HEMO-LOK MLX LARGE LF

## (undated) DEVICE — SUT CTD VICRYL VIL BR UR-6

## (undated) DEVICE — TUBING HF INSUFFLATION W/ FLTR

## (undated) DEVICE — SPONGE LAP 18X18 PREWASHED

## (undated) DEVICE — DRAPE CORETEMP FLD WRM 56X62IN

## (undated) DEVICE — TROCAR ENDOPATH XCEL 12X100MM

## (undated) DEVICE — BELLOW CANN HEMOBLAST 1.65GR

## (undated) DEVICE — DRAPE ABDOMINAL TIBURON 14X11

## (undated) DEVICE — COVER LIGHT HANDLE 80/CA

## (undated) DEVICE — NDL BOX COUNTER

## (undated) DEVICE — COVER MAYO STND XL 30X57IN

## (undated) DEVICE — SUT 3-0 VICRYL SH CR/8 18

## (undated) DEVICE — LUBRICANT SURGILUBE 2 OZ

## (undated) DEVICE — BOVIE SUCTION

## (undated) DEVICE — TOWEL OR DISP STRL BLUE 4/PK

## (undated) DEVICE — DRAPE UINDERBUT GRAD PCH

## (undated) DEVICE — CLIPPER BLADE MOD 4406 (CAREF)

## (undated) DEVICE — KIT GELPORT LAPAROSCOPIC ABD

## (undated) DEVICE — GOWN SURGICAL X-LARGE

## (undated) DEVICE — IRRIGATOR ENDOSCOPY DISP.

## (undated) DEVICE — SEALER LIGASURE IMPACT 18CM

## (undated) DEVICE — BOWL STERILE LARGE 32OZ

## (undated) DEVICE — TROCAR ENDOPATH XCEL 5X100MM

## (undated) DEVICE — SUT 2-0 NYLON D/A

## (undated) DEVICE — TRAY CATH FOL SIL URIMTR 16FR

## (undated) DEVICE — SUT PDS II 1 TP-1 VIL

## (undated) DEVICE — TIP YANKAUERS BULB NO VENT

## (undated) DEVICE — TRAY SKIN SCRUB WET PREMIUM

## (undated) DEVICE — ADHESIVE DERMABOND ADVANCED

## (undated) DEVICE — DRAPE LEGGINGS CUFF 33X51IN

## (undated) DEVICE — STAPLER ECHELON PWR CIR 29MM

## (undated) DEVICE — KIT ANTIFOG W/SPONG & FLUID

## (undated) DEVICE — SOL NACL 0.9% IV INJ 1000ML

## (undated) DEVICE — NDL 22GA X1 1/2 REG BEVEL

## (undated) DEVICE — SET IRR URLGY 2LINE UNIV SPIKE

## (undated) DEVICE — PACK LITHOTOMY I ECLIPSE

## (undated) DEVICE — CONTAINER SPECIMEN STRL 4OZ

## (undated) DEVICE — WIRE GD LUB STD 3CM .038 150CM

## (undated) DEVICE — STAPLER ECHELON FLEX 60MM 28CM

## (undated) DEVICE — Device

## (undated) DEVICE — SOL IRR WATER STRL 3000 ML

## (undated) DEVICE — SYR 10CC LUER LOCK

## (undated) DEVICE — SUT CTD VICRYL 0 VIL BR/CT

## (undated) DEVICE — SYR ONLY LUER LOCK 20CC

## (undated) DEVICE — PACK SPY-PHI DRUG DRAPE

## (undated) DEVICE — TRAY GENERAL SURGERY OMC

## (undated) DEVICE — TRAY CATH 1-LYR URIMTR 16FR

## (undated) DEVICE — POUCH SENSURA MIO 3/8X2 1/8IN

## (undated) DEVICE — GOWN POLY REINF BRTH SLV XL

## (undated) DEVICE — SUT CTD VICRYL VIL BR CT-2

## (undated) DEVICE — SEE MEDLINE ITEM 156902

## (undated) DEVICE — SEALER LIGASURE MARYLAND 37CM

## (undated) DEVICE — STAPLER ECHELON FLEX 60MM 44CM

## (undated) DEVICE — SOL IRR NACL .9% 3000ML

## (undated) DEVICE — TROCAR ENDOPATH XCEL 5X75MM

## (undated) DEVICE — RELOAD ECHELON FLEX BLU 60MM

## (undated) DEVICE — CANNULA ENDOPATH XCEL 5X100MM

## (undated) DEVICE — CATH POLLACK OPEN-END FLEXI-TI

## (undated) DEVICE — GUIDE WIRE MOTION .035 X 150CM

## (undated) DEVICE — TRAY MINOR GEN SURG OMC

## (undated) DEVICE — LEGGING CLEAR POLY 2/PACK